# Patient Record
Sex: MALE | Race: ASIAN | NOT HISPANIC OR LATINO | Employment: FULL TIME | URBAN - METROPOLITAN AREA
[De-identification: names, ages, dates, MRNs, and addresses within clinical notes are randomized per-mention and may not be internally consistent; named-entity substitution may affect disease eponyms.]

---

## 2017-02-03 ENCOUNTER — CHARTING TRANS (OUTPATIENT)
Dept: INTERNAL MEDICINE | Age: 47
End: 2017-02-03

## 2018-01-01 ENCOUNTER — EXTERNAL RECORD (OUTPATIENT)
Dept: HEALTH INFORMATION MANAGEMENT | Facility: OTHER | Age: 48
End: 2018-01-01

## 2018-11-28 ENCOUNTER — HOSPITAL ENCOUNTER (OUTPATIENT)
Dept: CT IMAGING | Age: 48
Discharge: HOME | End: 2018-11-28
Attending: INTERNAL MEDICINE

## 2018-11-28 DIAGNOSIS — Z13.9 ENCOUNTER FOR SCREENING: ICD-10-CM

## 2018-11-28 NOTE — PROGRESS NOTES
Patient here for Calcium Heart Scan - prelim is 0.0. Patient has significant family history for heart disease: father with open heart surgery at 70yo and patient's brother just had one stent placed last week at the age of 48.  Patient is on no meds for cho

## 2018-11-29 ENCOUNTER — CHARTING TRANS (OUTPATIENT)
Dept: OTHER | Age: 48
End: 2018-11-29

## 2018-11-29 ENCOUNTER — IMAGING SERVICES (OUTPATIENT)
Dept: OTHER | Age: 48
End: 2018-11-29

## 2018-11-29 ENCOUNTER — MYAURORA ACCOUNT LINK (OUTPATIENT)
Dept: OTHER | Age: 48
End: 2018-11-29

## 2018-11-29 VITALS
DIASTOLIC BLOOD PRESSURE: 74 MMHG | BODY MASS INDEX: 25.2 KG/M2 | TEMPERATURE: 98 F | HEIGHT: 71 IN | SYSTOLIC BLOOD PRESSURE: 132 MMHG | WEIGHT: 180 LBS | HEART RATE: 66 BPM

## 2018-11-30 ENCOUNTER — LAB SERVICES (OUTPATIENT)
Dept: OTHER | Age: 48
End: 2018-11-30

## 2018-11-30 LAB
25(OH)D3 SERPL-MCNC: 44 NG/ML (ref 30–100)
ALBUMIN SERPL BCG-MCNC: 4.8 G/DL (ref 3.6–5.1)
ALP SERPL-CCNC: 112 U/L (ref 45–115)
ALT SERPL W/O P-5'-P-CCNC: 40 U/L (ref 10–35)
AST SERPL-CCNC: 23 U/L (ref 9–37)
BILIRUB SERPL-MCNC: 0.7 MG/DL (ref 0–1)
BUN SERPL-MCNC: 13 MG/DL (ref 6–27)
CALCIUM SERPL-MCNC: 9.7 MG/DL (ref 8.6–10.6)
CHLORIDE SERPL-SCNC: 99 MMOL/L (ref 96–107)
CHOLEST SERPL-MCNC: 243 MG/DL (ref 140–200)
CREAT SERPL-MCNC: 1.1 MG/DL (ref 0.6–1.6)
DIFFERENTIAL TYPE: ABNORMAL
EST. AVERAGE GLUCOSE BLD GHB EST-MCNC: 104 MG/DL (ref 0–154)
GFR SERPL CREATININE-BSD FRML MDRD: >60 ML/MIN/{1.73M2}
GFR SERPL CREATININE-BSD FRML MDRD: >60 ML/MIN/{1.73M2}
GLUCOSE P FAST SERPL-MCNC: 81 MG/DL (ref 60–100)
HBA1C BLD-MCNC: 5.2 % (ref 4.2–6)
HCO3 SERPL-SCNC: 39 MMOL/L (ref 22–32)
HDLC SERPL-MCNC: 37 MG/DL
HEMATOCRIT: 47.5 % (ref 40–51)
HEMOGLOBIN: 16.3 G/DL (ref 13.7–17.5)
LDLC SERPL CALC-MCNC: 157 MG/DL (ref 0–100)
LYMPH PERCENT: 20.6 % (ref 20.5–51.1)
LYMPHOCYTE ABSOLUTE #: 1.4 10*3/UL (ref 1.2–3.4)
MEAN CORPUSCULAR HGB CONCENTRATION: 34.3 % (ref 32–36)
MEAN CORPUSCULAR HGB: 28.2 PG (ref 27–34)
MEAN CORPUSCULAR VOLUME: 82 FL (ref 79–95)
MEAN PLATELET VOLUME: 9.8 FL (ref 8.6–12.4)
MIXED %: 8 % (ref 4.3–12.9)
MIXED ABSOLUTE #: 0.6 10*3/UL (ref 0.2–0.9)
NEUTROPHIL ABSOLUTE #: 4.9 10*3/UL (ref 1.4–6.5)
NEUTROPHIL PERCENT: 71.4 % (ref 34–73.5)
PLATELET COUNT: 263 10*3/UL (ref 150–400)
POTASSIUM SERPL-SCNC: 4.4 MMOL/L (ref 3.5–5.3)
PROT SERPL-MCNC: 8.2 G/DL (ref 6.2–8.1)
RED BLOOD CELL COUNT: 5.79 10*6/UL (ref 3.9–5.7)
RED CELL DISTRIBUTION WIDTH: 13.3 % (ref 11.3–14.8)
SODIUM SERPL-SCNC: 139 MMOL/L (ref 136–146)
TRIGL SERPL-MCNC: 245 MG/DL (ref 0–200)
TSH SERPL DL<=0.05 MIU/L-ACNC: 1.78 M[IU]/L (ref 0.3–4.82)
WHITE BLOOD CELL COUNT: 6.9 10*3/UL (ref 4–10)

## 2018-12-01 DIAGNOSIS — E78.5 HYPERLIPIDEMIA, UNSPECIFIED HYPERLIPIDEMIA TYPE: Primary | ICD-10-CM

## 2018-12-01 RX ORDER — ROSUVASTATIN CALCIUM 20 MG/1
20 TABLET, COATED ORAL DAILY
Qty: 90 TABLET | Refills: 0 | Status: SHIPPED | OUTPATIENT
Start: 2018-12-01 | End: 2019-01-09 | Stop reason: SDUPTHER

## 2018-12-04 VITALS
SYSTOLIC BLOOD PRESSURE: 140 MMHG | BODY MASS INDEX: 24.78 KG/M2 | DIASTOLIC BLOOD PRESSURE: 92 MMHG | TEMPERATURE: 95.9 F | HEART RATE: 87 BPM | WEIGHT: 177 LBS | HEIGHT: 71 IN

## 2018-12-27 ENCOUNTER — TELEPHONE (OUTPATIENT)
Dept: INTERNAL MEDICINE | Age: 48
End: 2018-12-27

## 2019-01-09 ENCOUNTER — OFFICE VISIT (OUTPATIENT)
Dept: INTERNAL MEDICINE | Age: 49
End: 2019-01-09

## 2019-01-09 VITALS
OXYGEN SATURATION: 99 % | HEART RATE: 63 BPM | TEMPERATURE: 95.6 F | SYSTOLIC BLOOD PRESSURE: 134 MMHG | DIASTOLIC BLOOD PRESSURE: 78 MMHG | WEIGHT: 178 LBS | HEIGHT: 71 IN | BODY MASS INDEX: 24.92 KG/M2

## 2019-01-09 DIAGNOSIS — E78.2 MIXED HYPERLIPIDEMIA: ICD-10-CM

## 2019-01-09 DIAGNOSIS — E34.9 TESTOSTERONE DEFICIENCY: ICD-10-CM

## 2019-01-09 DIAGNOSIS — I10 ESSENTIAL HYPERTENSION: Primary | ICD-10-CM

## 2019-01-09 PROCEDURE — 3075F SYST BP GE 130 - 139MM HG: CPT | Performed by: INTERNAL MEDICINE

## 2019-01-09 PROCEDURE — 99214 OFFICE O/P EST MOD 30 MIN: CPT | Performed by: INTERNAL MEDICINE

## 2019-01-09 PROCEDURE — 3078F DIAST BP <80 MM HG: CPT | Performed by: INTERNAL MEDICINE

## 2019-01-09 RX ORDER — OMEGA-3/DHA/EPA/FISH OIL 60 MG-90MG
CAPSULE ORAL
COMMUNITY

## 2019-01-09 RX ORDER — MULTIVITAMIN
TABLET ORAL
COMMUNITY

## 2019-01-09 RX ORDER — UBIDECARENONE 100 MG
1 CAPSULE ORAL DAILY
COMMUNITY

## 2019-01-09 RX ORDER — AMLODIPINE BESYLATE 5 MG/1
5 TABLET ORAL DAILY
Qty: 90 TABLET | Refills: 0 | Status: SHIPPED | OUTPATIENT
Start: 2019-01-09 | End: 2019-04-07 | Stop reason: SDUPTHER

## 2019-01-09 RX ORDER — ROSUVASTATIN CALCIUM 20 MG/1
20 TABLET, COATED ORAL DAILY
Qty: 90 TABLET | Refills: 0 | Status: SHIPPED | OUTPATIENT
Start: 2019-01-09 | End: 2019-04-23 | Stop reason: SDUPTHER

## 2019-01-09 RX ORDER — AMLODIPINE BESYLATE 5 MG/1
5 TABLET ORAL
COMMUNITY
Start: 2018-11-29 | End: 2019-01-09 | Stop reason: SDUPTHER

## 2019-01-09 SDOH — HEALTH STABILITY: MENTAL HEALTH: HOW OFTEN DO YOU HAVE A DRINK CONTAINING ALCOHOL?: NEVER

## 2019-04-07 DIAGNOSIS — I10 ESSENTIAL HYPERTENSION: ICD-10-CM

## 2019-04-10 RX ORDER — AMLODIPINE BESYLATE 5 MG/1
TABLET ORAL
Qty: 90 TABLET | Refills: 0 | Status: SHIPPED | OUTPATIENT
Start: 2019-04-10 | End: 2019-04-23 | Stop reason: SDUPTHER

## 2019-04-23 DIAGNOSIS — I10 ESSENTIAL HYPERTENSION: ICD-10-CM

## 2019-04-23 DIAGNOSIS — E78.2 MIXED HYPERLIPIDEMIA: ICD-10-CM

## 2019-04-24 RX ORDER — AMLODIPINE BESYLATE 5 MG/1
5 TABLET ORAL DAILY
Qty: 90 TABLET | Refills: 0 | Status: SHIPPED | OUTPATIENT
Start: 2019-04-24 | End: 2019-07-26 | Stop reason: SDUPTHER

## 2019-04-24 RX ORDER — ROSUVASTATIN CALCIUM 20 MG/1
20 TABLET, COATED ORAL DAILY
Qty: 90 TABLET | Refills: 0 | Status: SHIPPED | OUTPATIENT
Start: 2019-04-24 | End: 2019-07-26 | Stop reason: SDUPTHER

## 2019-07-26 DIAGNOSIS — I10 ESSENTIAL HYPERTENSION: ICD-10-CM

## 2019-07-26 DIAGNOSIS — E78.2 MIXED HYPERLIPIDEMIA: ICD-10-CM

## 2019-07-31 RX ORDER — ROSUVASTATIN CALCIUM 20 MG/1
20 TABLET, COATED ORAL DAILY
Qty: 90 TABLET | Refills: 0 | Status: SHIPPED | OUTPATIENT
Start: 2019-07-31 | End: 2019-10-30 | Stop reason: SDUPTHER

## 2019-07-31 RX ORDER — AMLODIPINE BESYLATE 5 MG/1
5 TABLET ORAL DAILY
Qty: 90 TABLET | Refills: 0 | Status: SHIPPED | OUTPATIENT
Start: 2019-07-31 | End: 2019-10-30 | Stop reason: SDUPTHER

## 2019-10-29 DIAGNOSIS — E78.2 MIXED HYPERLIPIDEMIA: ICD-10-CM

## 2019-10-29 DIAGNOSIS — I10 ESSENTIAL HYPERTENSION: ICD-10-CM

## 2019-10-29 RX ORDER — AMLODIPINE BESYLATE 5 MG/1
5 TABLET ORAL DAILY
Qty: 90 TABLET | Refills: 0 | Status: CANCELLED | OUTPATIENT
Start: 2019-10-29

## 2019-10-29 RX ORDER — ROSUVASTATIN CALCIUM 20 MG/1
20 TABLET, COATED ORAL DAILY
Qty: 90 TABLET | Refills: 0 | Status: CANCELLED | OUTPATIENT
Start: 2019-10-29

## 2019-10-30 ENCOUNTER — OFFICE VISIT (OUTPATIENT)
Dept: INTERNAL MEDICINE | Age: 49
End: 2019-10-30

## 2019-10-30 VITALS
RESPIRATION RATE: 12 BRPM | WEIGHT: 180 LBS | DIASTOLIC BLOOD PRESSURE: 70 MMHG | BODY MASS INDEX: 25.2 KG/M2 | HEIGHT: 71 IN | TEMPERATURE: 97.6 F | SYSTOLIC BLOOD PRESSURE: 130 MMHG | HEART RATE: 70 BPM

## 2019-10-30 DIAGNOSIS — I10 ESSENTIAL HYPERTENSION: Primary | ICD-10-CM

## 2019-10-30 DIAGNOSIS — E78.2 MIXED HYPERLIPIDEMIA: ICD-10-CM

## 2019-10-30 DIAGNOSIS — J30.9 ALLERGIC RHINITIS, UNSPECIFIED SEASONALITY, UNSPECIFIED TRIGGER: ICD-10-CM

## 2019-10-30 PROCEDURE — 3075F SYST BP GE 130 - 139MM HG: CPT | Performed by: INTERNAL MEDICINE

## 2019-10-30 PROCEDURE — 3078F DIAST BP <80 MM HG: CPT | Performed by: INTERNAL MEDICINE

## 2019-10-30 PROCEDURE — 99214 OFFICE O/P EST MOD 30 MIN: CPT | Performed by: INTERNAL MEDICINE

## 2019-10-30 RX ORDER — ROSUVASTATIN CALCIUM 20 MG/1
20 TABLET, COATED ORAL DAILY
Qty: 90 TABLET | Refills: 0 | Status: SHIPPED | OUTPATIENT
Start: 2019-10-30 | End: 2020-01-21 | Stop reason: SDUPTHER

## 2019-10-30 RX ORDER — AMLODIPINE BESYLATE 5 MG/1
5 TABLET ORAL DAILY
Qty: 90 TABLET | Refills: 0 | Status: SHIPPED | OUTPATIENT
Start: 2019-10-30 | End: 2020-01-26 | Stop reason: SDUPTHER

## 2019-10-30 SDOH — HEALTH STABILITY: PHYSICAL HEALTH: ON AVERAGE, HOW MANY DAYS PER WEEK DO YOU ENGAGE IN MODERATE TO STRENUOUS EXERCISE (LIKE A BRISK WALK)?: 3 DAYS

## 2019-10-30 SDOH — HEALTH STABILITY: PHYSICAL HEALTH: ON AVERAGE, HOW MANY MINUTES DO YOU ENGAGE IN EXERCISE AT THIS LEVEL?: 30 MIN

## 2019-10-30 SDOH — HEALTH STABILITY: MENTAL HEALTH
STRESS IS WHEN SOMEONE FEELS TENSE, NERVOUS, ANXIOUS, OR CAN'T SLEEP AT NIGHT BECAUSE THEIR MIND IS TROUBLED. HOW STRESSED ARE YOU?: ONLY A LITTLE

## 2019-10-30 SDOH — HEALTH STABILITY: MENTAL HEALTH: HOW OFTEN DO YOU HAVE A DRINK CONTAINING ALCOHOL?: NEVER

## 2019-10-30 ASSESSMENT — PATIENT HEALTH QUESTIONNAIRE - PHQ9
SUM OF ALL RESPONSES TO PHQ9 QUESTIONS 1 AND 2: 0
2. FEELING DOWN, DEPRESSED OR HOPELESS: NOT AT ALL
SUM OF ALL RESPONSES TO PHQ9 QUESTIONS 1 AND 2: 0
1. LITTLE INTEREST OR PLEASURE IN DOING THINGS: NOT AT ALL

## 2019-11-08 ENCOUNTER — E-ADVICE (OUTPATIENT)
Dept: INTERNAL MEDICINE | Age: 49
End: 2019-11-08

## 2019-12-12 ENCOUNTER — LAB SERVICES (OUTPATIENT)
Dept: LAB | Age: 49
End: 2019-12-12

## 2019-12-12 DIAGNOSIS — E78.2 MIXED HYPERLIPIDEMIA: ICD-10-CM

## 2019-12-12 LAB
ALBUMIN SERPL BCG-MCNC: 4.7 G/DL (ref 3.6–5.1)
ALP SERPL-CCNC: 87 U/L (ref 45–115)
ALT SERPL W/O P-5'-P-CCNC: 50 U/L (ref 10–35)
AST SERPL-CCNC: 32 U/L (ref 9–37)
BILIRUB SERPL-MCNC: 0.6 MG/DL (ref 0–1)
BUN SERPL-MCNC: 11 MG/DL (ref 6–27)
CALCIUM SERPL-MCNC: 9.3 MG/DL (ref 8.6–10.6)
CHLORIDE SERPL-SCNC: 102 MMOL/L (ref 96–107)
CHOLEST SERPL-MCNC: 151 MG/DL (ref 140–200)
CREAT SERPL-MCNC: 0.9 MG/DL (ref 0.6–1.6)
GFR SERPL CREATININE-BSD FRML MDRD: >60 ML/MIN/{1.73M2}
GFR SERPL CREATININE-BSD FRML MDRD: >60 ML/MIN/{1.73M2}
GLUCOSE P FAST SERPL-MCNC: 92 MG/DL (ref 60–100)
HCO3 SERPL-SCNC: 29 MMOL/L (ref 22–32)
HDLC SERPL-MCNC: 36 MG/DL
LDLC SERPL CALC-MCNC: 74 MG/DL (ref 0–100)
POTASSIUM SERPL-SCNC: 4.3 MMOL/L (ref 3.5–5.3)
PROT SERPL-MCNC: 7.8 G/DL (ref 6.2–8.1)
SODIUM SERPL-SCNC: 139 MMOL/L (ref 136–146)
TRIGL SERPL-MCNC: 205 MG/DL (ref 0–200)

## 2019-12-12 PROCEDURE — 80053 COMPREHEN METABOLIC PANEL: CPT | Performed by: INTERNAL MEDICINE

## 2019-12-12 PROCEDURE — 80061 LIPID PANEL: CPT | Performed by: INTERNAL MEDICINE

## 2019-12-12 PROCEDURE — 36415 COLL VENOUS BLD VENIPUNCTURE: CPT | Performed by: INTERNAL MEDICINE

## 2020-01-14 ENCOUNTER — APPOINTMENT (OUTPATIENT)
Dept: INTERNAL MEDICINE | Age: 50
End: 2020-01-14

## 2020-01-21 DIAGNOSIS — E78.2 MIXED HYPERLIPIDEMIA: ICD-10-CM

## 2020-01-21 RX ORDER — ROSUVASTATIN CALCIUM 20 MG/1
TABLET, COATED ORAL
Qty: 90 TABLET | Refills: 1 | Status: SHIPPED | OUTPATIENT
Start: 2020-01-21 | End: 2020-07-31

## 2020-01-26 DIAGNOSIS — I10 ESSENTIAL HYPERTENSION: ICD-10-CM

## 2020-01-27 RX ORDER — AMLODIPINE BESYLATE 5 MG/1
TABLET ORAL
Qty: 30 TABLET | Refills: 0 | Status: SHIPPED | OUTPATIENT
Start: 2020-01-27 | End: 2020-02-19 | Stop reason: SDUPTHER

## 2020-01-29 DIAGNOSIS — I10 ESSENTIAL HYPERTENSION: ICD-10-CM

## 2020-01-31 RX ORDER — AMLODIPINE BESYLATE 5 MG/1
TABLET ORAL
Qty: 90 TABLET | Refills: 0 | OUTPATIENT
Start: 2020-01-31

## 2020-02-19 DIAGNOSIS — I10 ESSENTIAL HYPERTENSION: ICD-10-CM

## 2020-02-20 RX ORDER — AMLODIPINE BESYLATE 5 MG/1
5 TABLET ORAL DAILY
Qty: 30 TABLET | Refills: 0 | Status: SHIPPED | OUTPATIENT
Start: 2020-02-20 | End: 2020-03-26

## 2020-03-26 DIAGNOSIS — I10 ESSENTIAL HYPERTENSION: ICD-10-CM

## 2020-03-26 RX ORDER — AMLODIPINE BESYLATE 5 MG/1
TABLET ORAL
Qty: 30 TABLET | Refills: 0 | Status: SHIPPED | OUTPATIENT
Start: 2020-03-26 | End: 2020-04-25

## 2020-04-03 DIAGNOSIS — I10 ESSENTIAL HYPERTENSION: ICD-10-CM

## 2020-04-03 RX ORDER — AMLODIPINE BESYLATE 5 MG/1
TABLET ORAL
Qty: 30 TABLET | Refills: 0 | OUTPATIENT
Start: 2020-04-03

## 2020-04-25 DIAGNOSIS — I10 ESSENTIAL HYPERTENSION: ICD-10-CM

## 2020-04-25 RX ORDER — AMLODIPINE BESYLATE 5 MG/1
TABLET ORAL
Qty: 30 TABLET | Refills: 0 | Status: SHIPPED | OUTPATIENT
Start: 2020-04-25 | End: 2020-05-25

## 2020-05-22 DIAGNOSIS — I10 ESSENTIAL HYPERTENSION: ICD-10-CM

## 2020-05-25 RX ORDER — AMLODIPINE BESYLATE 5 MG/1
TABLET ORAL
Qty: 30 TABLET | Refills: 0 | Status: SHIPPED | OUTPATIENT
Start: 2020-05-25 | End: 2020-06-24

## 2020-06-24 DIAGNOSIS — I10 ESSENTIAL HYPERTENSION: ICD-10-CM

## 2020-06-24 RX ORDER — AMLODIPINE BESYLATE 5 MG/1
TABLET ORAL
Qty: 30 TABLET | Refills: 0 | Status: SHIPPED | OUTPATIENT
Start: 2020-06-24 | End: 2020-07-31

## 2020-07-02 DIAGNOSIS — I10 ESSENTIAL HYPERTENSION: ICD-10-CM

## 2020-07-02 RX ORDER — AMLODIPINE BESYLATE 5 MG/1
5 TABLET ORAL DAILY
Qty: 30 TABLET | Refills: 0 | OUTPATIENT
Start: 2020-07-02

## 2020-07-28 DIAGNOSIS — I10 ESSENTIAL HYPERTENSION: ICD-10-CM

## 2020-07-31 ENCOUNTER — V-VISIT (OUTPATIENT)
Dept: INTERNAL MEDICINE | Age: 50
End: 2020-07-31

## 2020-07-31 DIAGNOSIS — E78.2 MIXED HYPERLIPIDEMIA: ICD-10-CM

## 2020-07-31 DIAGNOSIS — I10 ESSENTIAL HYPERTENSION: ICD-10-CM

## 2020-07-31 DIAGNOSIS — I10 ESSENTIAL HYPERTENSION: Primary | ICD-10-CM

## 2020-07-31 PROCEDURE — 99214 OFFICE O/P EST MOD 30 MIN: CPT | Performed by: INTERNAL MEDICINE

## 2020-07-31 RX ORDER — ROSUVASTATIN CALCIUM 20 MG/1
20 TABLET, COATED ORAL DAILY
Qty: 90 TABLET | Refills: 1 | Status: SHIPPED | OUTPATIENT
Start: 2020-07-31

## 2020-07-31 RX ORDER — AMLODIPINE BESYLATE 5 MG/1
TABLET ORAL
Qty: 30 TABLET | Refills: 0 | Status: SHIPPED | OUTPATIENT
Start: 2020-07-31 | End: 2022-07-06 | Stop reason: ALTCHOICE

## 2020-07-31 RX ORDER — AMLODIPINE BESYLATE 5 MG/1
5 TABLET ORAL DAILY
Qty: 90 TABLET | Refills: 1 | Status: SHIPPED | OUTPATIENT
Start: 2020-07-31 | End: 2022-07-06 | Stop reason: ALTCHOICE

## 2020-07-31 RX ORDER — ROSUVASTATIN CALCIUM 20 MG/1
TABLET, COATED ORAL
Qty: 90 TABLET | Refills: 1 | Status: SHIPPED | OUTPATIENT
Start: 2020-07-31 | End: 2020-07-31 | Stop reason: SDUPTHER

## 2020-07-31 SDOH — HEALTH STABILITY: MENTAL HEALTH
STRESS IS WHEN SOMEONE FEELS TENSE, NERVOUS, ANXIOUS, OR CAN'T SLEEP AT NIGHT BECAUSE THEIR MIND IS TROUBLED. HOW STRESSED ARE YOU?: NOT AT ALL

## 2020-07-31 SDOH — HEALTH STABILITY: PHYSICAL HEALTH: ON AVERAGE, HOW MANY DAYS PER WEEK DO YOU ENGAGE IN MODERATE TO STRENUOUS EXERCISE (LIKE A BRISK WALK)?: 0 DAYS

## 2020-07-31 SDOH — HEALTH STABILITY: PHYSICAL HEALTH: ON AVERAGE, HOW MANY MINUTES DO YOU ENGAGE IN EXERCISE AT THIS LEVEL?: NOT ASKED

## 2020-07-31 ASSESSMENT — PATIENT HEALTH QUESTIONNAIRE - PHQ9
CLINICAL INTERPRETATION OF PHQ9 SCORE: NO FURTHER SCREENING NEEDED
SUM OF ALL RESPONSES TO PHQ9 QUESTIONS 1 AND 2: 0
1. LITTLE INTEREST OR PLEASURE IN DOING THINGS: NOT AT ALL
SUM OF ALL RESPONSES TO PHQ9 QUESTIONS 1 AND 2: 0
CLINICAL INTERPRETATION OF PHQ2 SCORE: NO FURTHER SCREENING NEEDED
2. FEELING DOWN, DEPRESSED OR HOPELESS: NOT AT ALL

## 2020-08-01 RX ORDER — ROSUVASTATIN CALCIUM 20 MG/1
20 TABLET, COATED ORAL DAILY
Qty: 90 TABLET | Refills: 1 | OUTPATIENT
Start: 2020-08-01

## 2020-08-01 RX ORDER — AMLODIPINE BESYLATE 5 MG/1
5 TABLET ORAL DAILY
Qty: 30 TABLET | Refills: 0 | OUTPATIENT
Start: 2020-08-01

## 2021-01-13 ENCOUNTER — OFFICE VISIT (OUTPATIENT)
Dept: FAMILY MEDICINE CLINIC | Facility: CLINIC | Age: 51
End: 2021-01-13
Payer: COMMERCIAL

## 2021-01-13 VITALS
RESPIRATION RATE: 18 BRPM | BODY MASS INDEX: 25.76 KG/M2 | OXYGEN SATURATION: 98 % | WEIGHT: 182 LBS | HEIGHT: 70.47 IN | HEART RATE: 85 BPM | DIASTOLIC BLOOD PRESSURE: 80 MMHG | SYSTOLIC BLOOD PRESSURE: 140 MMHG

## 2021-01-13 DIAGNOSIS — E53.8 B12 DEFICIENCY: ICD-10-CM

## 2021-01-13 DIAGNOSIS — I10 ESSENTIAL HYPERTENSION: ICD-10-CM

## 2021-01-13 DIAGNOSIS — E78.5 HYPERLIPIDEMIA, UNSPECIFIED HYPERLIPIDEMIA TYPE: ICD-10-CM

## 2021-01-13 DIAGNOSIS — Z00.00 ANNUAL PHYSICAL EXAM: Primary | ICD-10-CM

## 2021-01-13 DIAGNOSIS — E55.9 VITAMIN D DEFICIENCY: ICD-10-CM

## 2021-01-13 PROCEDURE — 3079F DIAST BP 80-89 MM HG: CPT | Performed by: FAMILY MEDICINE

## 2021-01-13 PROCEDURE — 99386 PREV VISIT NEW AGE 40-64: CPT | Performed by: FAMILY MEDICINE

## 2021-01-13 PROCEDURE — 3077F SYST BP >= 140 MM HG: CPT | Performed by: FAMILY MEDICINE

## 2021-01-13 PROCEDURE — 3008F BODY MASS INDEX DOCD: CPT | Performed by: FAMILY MEDICINE

## 2021-01-13 PROCEDURE — 99202 OFFICE O/P NEW SF 15 MIN: CPT | Performed by: FAMILY MEDICINE

## 2021-01-13 RX ORDER — OMEGA-3/DHA/EPA/FISH OIL 60 MG-90MG
CAPSULE ORAL
COMMUNITY
End: 2021-01-13

## 2021-01-13 RX ORDER — LISINOPRIL AND HYDROCHLOROTHIAZIDE 12.5; 1 MG/1; MG/1
1 TABLET ORAL DAILY
Qty: 90 TABLET | Refills: 3 | Status: SHIPPED | OUTPATIENT
Start: 2021-01-13 | End: 2022-01-08

## 2021-01-13 RX ORDER — OMEGA-3 FATTY ACIDS CAP DELAYED RELEASE 1000 MG 1000 MG
1000 CAPSULE DELAYED RELEASE ORAL DAILY
Refills: 0 | COMMUNITY
Start: 2021-01-13

## 2021-01-13 RX ORDER — ROSUVASTATIN CALCIUM 20 MG/1
20 TABLET, COATED ORAL DAILY
COMMUNITY
Start: 2020-07-31 | End: 2021-05-13

## 2021-01-13 NOTE — H&P
Maurice Wallace is a 53year old male who presents for a complete physical exam.   HPI:   PFH includes early heart disease in father and brother. Pt had heart scan completed ~ 3 years ago. Hyperlipidemia  This is a chronic problem.  The current episode sta Take 5 mg by mouth nightly. • Multiple Vitamin (MULTI-VITAMIN DAILY) Oral Tab Take by mouth. • amLODIPine Besylate 5 MG Oral Tab Take 5 mg by mouth daily. • Coenzyme Q10 (COQ10) 100 MG Oral Cap Take by mouth.         No past medical history on f clubbing or edema  NEURO: Oriented times three, strength 5/5 x 4 ext, LE DTRs 2+  PSYCH: mood and affect appropriate    ASSESSMENT AND PLAN:   Daniel Aragon is a 53year old male who presents for a complete physical exam. Recommended heart healthy diet, Standing Expiration Date: 1/13/2022      Lipid Panel [E]          Standing Status: Future          Standing Expiration Date: 1/13/2022      TSH W Reflex To Free T4 [E]          Standing Status: Future          Standing Expiration Date: 1/13/2022      Vitam

## 2021-01-14 ENCOUNTER — LAB ENCOUNTER (OUTPATIENT)
Dept: LAB | Age: 51
End: 2021-01-14
Attending: FAMILY MEDICINE
Payer: COMMERCIAL

## 2021-01-14 DIAGNOSIS — E55.9 VITAMIN D DEFICIENCY: ICD-10-CM

## 2021-01-14 DIAGNOSIS — E53.8 B12 DEFICIENCY: ICD-10-CM

## 2021-01-14 DIAGNOSIS — Z00.00 ANNUAL PHYSICAL EXAM: ICD-10-CM

## 2021-01-14 LAB
ALBUMIN SERPL-MCNC: 4.1 G/DL (ref 3.4–5)
ALBUMIN/GLOB SERPL: 0.9 {RATIO} (ref 1–2)
ALP LIVER SERPL-CCNC: 98 U/L
ALT SERPL-CCNC: 61 U/L
ANION GAP SERPL CALC-SCNC: 4 MMOL/L (ref 0–18)
AST SERPL-CCNC: 31 U/L (ref 15–37)
BASOPHILS # BLD AUTO: 0.03 X10(3) UL (ref 0–0.2)
BASOPHILS NFR BLD AUTO: 0.4 %
BILIRUB SERPL-MCNC: 0.7 MG/DL (ref 0.1–2)
BUN BLD-MCNC: 10 MG/DL (ref 7–18)
BUN/CREAT SERPL: 11 (ref 10–20)
CALCIUM BLD-MCNC: 9.4 MG/DL (ref 8.5–10.1)
CHLORIDE SERPL-SCNC: 106 MMOL/L (ref 98–112)
CHOLEST SMN-MCNC: 160 MG/DL (ref ?–200)
CO2 SERPL-SCNC: 29 MMOL/L (ref 21–32)
CREAT BLD-MCNC: 0.91 MG/DL
DEPRECATED RDW RBC AUTO: 39.9 FL (ref 35.1–46.3)
EOSINOPHIL # BLD AUTO: 0.33 X10(3) UL (ref 0–0.7)
EOSINOPHIL NFR BLD AUTO: 4.5 %
ERYTHROCYTE [DISTWIDTH] IN BLOOD BY AUTOMATED COUNT: 13.2 % (ref 11–15)
GLOBULIN PLAS-MCNC: 4.4 G/DL (ref 2.8–4.4)
GLUCOSE BLD-MCNC: 89 MG/DL (ref 70–99)
HCT VFR BLD AUTO: 47.8 %
HDLC SERPL-MCNC: 35 MG/DL (ref 40–59)
HGB BLD-MCNC: 15.7 G/DL
IMM GRANULOCYTES # BLD AUTO: 0.01 X10(3) UL (ref 0–1)
IMM GRANULOCYTES NFR BLD: 0.1 %
LDLC SERPL CALC-MCNC: 78 MG/DL (ref ?–100)
LYMPHOCYTES # BLD AUTO: 2.08 X10(3) UL (ref 1–4)
LYMPHOCYTES NFR BLD AUTO: 28.1 %
M PROTEIN MFR SERPL ELPH: 8.5 G/DL (ref 6.4–8.2)
MCH RBC QN AUTO: 27.6 PG (ref 26–34)
MCHC RBC AUTO-ENTMCNC: 32.8 G/DL (ref 31–37)
MCV RBC AUTO: 84 FL
MONOCYTES # BLD AUTO: 0.58 X10(3) UL (ref 0.1–1)
MONOCYTES NFR BLD AUTO: 7.8 %
NEUTROPHILS # BLD AUTO: 4.38 X10 (3) UL (ref 1.5–7.7)
NEUTROPHILS # BLD AUTO: 4.38 X10(3) UL (ref 1.5–7.7)
NEUTROPHILS NFR BLD AUTO: 59.1 %
NONHDLC SERPL-MCNC: 125 MG/DL (ref ?–130)
OSMOLALITY SERPL CALC.SUM OF ELEC: 287 MOSM/KG (ref 275–295)
PATIENT FASTING Y/N/NP: YES
PATIENT FASTING Y/N/NP: YES
PLATELET # BLD AUTO: 279 10(3)UL (ref 150–450)
POTASSIUM SERPL-SCNC: 5.1 MMOL/L (ref 3.5–5.1)
RBC # BLD AUTO: 5.69 X10(6)UL
SODIUM SERPL-SCNC: 139 MMOL/L (ref 136–145)
TRIGL SERPL-MCNC: 235 MG/DL (ref 30–149)
TSI SER-ACNC: 2.61 MIU/ML (ref 0.36–3.74)
VIT B12 SERPL-MCNC: 594 PG/ML (ref 193–986)
VIT D+METAB SERPL-MCNC: 34.5 NG/ML (ref 30–100)
VLDLC SERPL CALC-MCNC: 47 MG/DL (ref 0–30)
WBC # BLD AUTO: 7.4 X10(3) UL (ref 4–11)

## 2021-01-14 PROCEDURE — 36415 COLL VENOUS BLD VENIPUNCTURE: CPT

## 2021-01-14 PROCEDURE — 82306 VITAMIN D 25 HYDROXY: CPT

## 2021-01-14 PROCEDURE — 85025 COMPLETE CBC W/AUTO DIFF WBC: CPT

## 2021-01-14 PROCEDURE — 82607 VITAMIN B-12: CPT

## 2021-01-14 PROCEDURE — 84443 ASSAY THYROID STIM HORMONE: CPT

## 2021-01-14 PROCEDURE — 80053 COMPREHEN METABOLIC PANEL: CPT

## 2021-01-14 PROCEDURE — 80061 LIPID PANEL: CPT

## 2021-02-10 ENCOUNTER — TELEPHONE (OUTPATIENT)
Dept: FAMILY MEDICINE CLINIC | Facility: CLINIC | Age: 51
End: 2021-02-10

## 2021-02-28 DIAGNOSIS — I10 ESSENTIAL HYPERTENSION: ICD-10-CM

## 2021-02-28 RX ORDER — AMLODIPINE BESYLATE 5 MG/1
TABLET ORAL
Qty: 90 TABLET | Refills: 1 | OUTPATIENT
Start: 2021-02-28

## 2021-03-24 ENCOUNTER — OFFICE VISIT (OUTPATIENT)
Dept: FAMILY MEDICINE CLINIC | Facility: CLINIC | Age: 51
End: 2021-03-24
Payer: COMMERCIAL

## 2021-03-24 VITALS
BODY MASS INDEX: 25.51 KG/M2 | WEIGHT: 182.19 LBS | OXYGEN SATURATION: 97 % | DIASTOLIC BLOOD PRESSURE: 94 MMHG | HEART RATE: 82 BPM | HEIGHT: 71 IN | RESPIRATION RATE: 18 BRPM | SYSTOLIC BLOOD PRESSURE: 142 MMHG | TEMPERATURE: 98 F

## 2021-03-24 DIAGNOSIS — I10 ESSENTIAL HYPERTENSION: Primary | ICD-10-CM

## 2021-03-24 DIAGNOSIS — E78.1 HIGH TRIGLYCERIDES: ICD-10-CM

## 2021-03-24 DIAGNOSIS — N52.9 ERECTILE DYSFUNCTION, UNSPECIFIED ERECTILE DYSFUNCTION TYPE: ICD-10-CM

## 2021-03-24 PROCEDURE — 99214 OFFICE O/P EST MOD 30 MIN: CPT | Performed by: FAMILY MEDICINE

## 2021-03-24 PROCEDURE — 3008F BODY MASS INDEX DOCD: CPT | Performed by: FAMILY MEDICINE

## 2021-03-24 PROCEDURE — 3077F SYST BP >= 140 MM HG: CPT | Performed by: FAMILY MEDICINE

## 2021-03-24 PROCEDURE — 3080F DIAST BP >= 90 MM HG: CPT | Performed by: FAMILY MEDICINE

## 2021-03-24 NOTE — PROGRESS NOTES
HPI/Subjective:   Patient ID: Michelle Zabala is a 53year old male. Hypertension  This is a chronic problem. The current episode started more than 1 year ago. The problem has been gradually worsening since onset. The problem is controlled.  Pertinent nega stress, a sedentary lifestyle and hypertension. History/Other:   Review of Systems   Constitutional: Negative for malaise/fatigue. Eyes: Negative for blurred vision. Respiratory: Negative for shortness of breath.     Cardiovascular: Negative for c Follow up based on results. - LIPID PANEL; Future    3. Erectile dysfunction, unspecified erectile dysfunction type  Not likely due to any current medications. Advised to complete blood work to look for testosterone deficiency.  Discuss treatment based on

## 2021-05-05 DIAGNOSIS — E78.2 MIXED HYPERLIPIDEMIA: ICD-10-CM

## 2021-05-06 RX ORDER — ROSUVASTATIN CALCIUM 20 MG/1
TABLET, COATED ORAL
Qty: 90 TABLET | Refills: 1 | OUTPATIENT
Start: 2021-05-06

## 2021-05-13 NOTE — TELEPHONE ENCOUNTER
Pt requesting refill on his cholesterol med - first time Dr. Shana Balbuena will be prescribing - pls send script to [facility] on [address] near [facility] .   Pt asking to expedite as he is out

## 2021-05-13 NOTE — TELEPHONE ENCOUNTER
LOV 3/24/21  We have not filled before. Pended #90 + 1 if agreeable. Pt is asking for urgent fill; he is out.

## 2021-05-14 RX ORDER — ROSUVASTATIN CALCIUM 20 MG/1
20 TABLET, COATED ORAL DAILY
Qty: 90 TABLET | Refills: 1 | Status: SHIPPED | OUTPATIENT
Start: 2021-05-14 | End: 2021-08-05

## 2021-08-05 RX ORDER — ROSUVASTATIN CALCIUM 20 MG/1
20 TABLET, COATED ORAL DAILY
Qty: 90 TABLET | Refills: 1 | Status: SHIPPED | OUTPATIENT
Start: 2021-08-05 | End: 2022-01-31

## 2022-01-27 DIAGNOSIS — I10 ESSENTIAL HYPERTENSION: ICD-10-CM

## 2022-01-28 RX ORDER — AMLODIPINE BESYLATE 5 MG/1
TABLET ORAL
Qty: 90 TABLET | Refills: 1 | OUTPATIENT
Start: 2022-01-28

## 2022-01-31 RX ORDER — LISINOPRIL AND HYDROCHLOROTHIAZIDE 12.5; 1 MG/1; MG/1
TABLET ORAL
Qty: 90 TABLET | Refills: 3 | Status: SHIPPED | OUTPATIENT
Start: 2022-01-31

## 2022-01-31 RX ORDER — ROSUVASTATIN CALCIUM 20 MG/1
TABLET, COATED ORAL
Qty: 90 TABLET | Refills: 1 | Status: SHIPPED | OUTPATIENT
Start: 2022-01-31

## 2022-01-31 RX ORDER — LISINOPRIL AND HYDROCHLOROTHIAZIDE 12.5; 1 MG/1; MG/1
1 TABLET ORAL DAILY
Qty: 90 TABLET | Refills: 3 | OUTPATIENT
Start: 2022-01-31

## 2022-07-03 ENCOUNTER — OFFICE VISIT (OUTPATIENT)
Dept: FAMILY MEDICINE CLINIC | Facility: CLINIC | Age: 52
End: 2022-07-03
Payer: COMMERCIAL

## 2022-07-03 VITALS
WEIGHT: 180 LBS | DIASTOLIC BLOOD PRESSURE: 66 MMHG | HEIGHT: 71 IN | TEMPERATURE: 97 F | SYSTOLIC BLOOD PRESSURE: 125 MMHG | OXYGEN SATURATION: 98 % | HEART RATE: 112 BPM | BODY MASS INDEX: 25.2 KG/M2

## 2022-07-03 DIAGNOSIS — J06.9 VIRAL URI WITH COUGH: Primary | ICD-10-CM

## 2022-07-03 DIAGNOSIS — H10.33 ACUTE BACTERIAL CONJUNCTIVITIS OF BOTH EYES: ICD-10-CM

## 2022-07-03 RX ORDER — POLYMYXIN B SULFATE AND TRIMETHOPRIM 1; 10000 MG/ML; [USP'U]/ML
1 SOLUTION OPHTHALMIC EVERY 4 HOURS
Qty: 1 EACH | Refills: 0 | Status: SHIPPED | OUTPATIENT
Start: 2022-07-03 | End: 2022-07-10

## 2022-07-06 ENCOUNTER — WALK IN (OUTPATIENT)
Dept: URGENT CARE | Age: 52
End: 2022-07-06

## 2022-07-06 VITALS
DIASTOLIC BLOOD PRESSURE: 82 MMHG | TEMPERATURE: 97.2 F | SYSTOLIC BLOOD PRESSURE: 119 MMHG | HEART RATE: 95 BPM | RESPIRATION RATE: 16 BRPM | OXYGEN SATURATION: 97 %

## 2022-07-06 DIAGNOSIS — J32.9 SINUSITIS, UNSPECIFIED CHRONICITY, UNSPECIFIED LOCATION: Primary | ICD-10-CM

## 2022-07-06 PROCEDURE — 3079F DIAST BP 80-89 MM HG: CPT | Performed by: FAMILY MEDICINE

## 2022-07-06 PROCEDURE — 3074F SYST BP LT 130 MM HG: CPT | Performed by: FAMILY MEDICINE

## 2022-07-06 PROCEDURE — 99214 OFFICE O/P EST MOD 30 MIN: CPT | Performed by: FAMILY MEDICINE

## 2022-07-06 RX ORDER — CEFUROXIME AXETIL 500 MG/1
500 TABLET ORAL 2 TIMES DAILY
Qty: 20 TABLET | Refills: 0 | Status: SHIPPED | OUTPATIENT
Start: 2022-07-06 | End: 2022-07-16

## 2022-07-06 RX ORDER — LISINOPRIL AND HYDROCHLOROTHIAZIDE 12.5; 1 MG/1; MG/1
1 TABLET ORAL DAILY
COMMUNITY
Start: 2022-01-31

## 2022-07-06 RX ORDER — POLYMYXIN B SULFATE AND TRIMETHOPRIM 1; 10000 MG/ML; [USP'U]/ML
1 SOLUTION OPHTHALMIC
COMMUNITY
Start: 2022-07-03 | End: 2022-07-10

## 2022-07-06 ASSESSMENT — PAIN SCALES - GENERAL: PAINLEVEL: 0

## 2022-07-28 RX ORDER — ROSUVASTATIN CALCIUM 20 MG/1
TABLET, COATED ORAL
Qty: 90 TABLET | Refills: 1 | OUTPATIENT
Start: 2022-07-28

## 2022-08-04 ENCOUNTER — OFFICE VISIT (OUTPATIENT)
Dept: FAMILY MEDICINE CLINIC | Facility: CLINIC | Age: 52
End: 2022-08-04
Payer: COMMERCIAL

## 2022-08-04 VITALS
DIASTOLIC BLOOD PRESSURE: 74 MMHG | RESPIRATION RATE: 16 BRPM | BODY MASS INDEX: 22.96 KG/M2 | HEART RATE: 77 BPM | SYSTOLIC BLOOD PRESSURE: 136 MMHG | WEIGHT: 164 LBS | HEIGHT: 71 IN | OXYGEN SATURATION: 99 %

## 2022-08-04 DIAGNOSIS — Z23 NEED FOR SHINGLES VACCINE: ICD-10-CM

## 2022-08-04 DIAGNOSIS — Z12.11 COLON CANCER SCREENING: ICD-10-CM

## 2022-08-04 DIAGNOSIS — Z82.49 FAMILY HISTORY OF EARLY CAD: ICD-10-CM

## 2022-08-04 DIAGNOSIS — Z00.00 ANNUAL PHYSICAL EXAM: Primary | ICD-10-CM

## 2022-08-04 PROCEDURE — 3078F DIAST BP <80 MM HG: CPT | Performed by: FAMILY MEDICINE

## 2022-08-04 PROCEDURE — 90750 HZV VACC RECOMBINANT IM: CPT | Performed by: FAMILY MEDICINE

## 2022-08-04 PROCEDURE — 99396 PREV VISIT EST AGE 40-64: CPT | Performed by: FAMILY MEDICINE

## 2022-08-04 PROCEDURE — 90471 IMMUNIZATION ADMIN: CPT | Performed by: FAMILY MEDICINE

## 2022-08-04 PROCEDURE — 3075F SYST BP GE 130 - 139MM HG: CPT | Performed by: FAMILY MEDICINE

## 2022-08-04 PROCEDURE — 3008F BODY MASS INDEX DOCD: CPT | Performed by: FAMILY MEDICINE

## 2022-08-09 ENCOUNTER — LABORATORY ENCOUNTER (OUTPATIENT)
Dept: LAB | Age: 52
End: 2022-08-09
Attending: FAMILY MEDICINE
Payer: COMMERCIAL

## 2022-08-09 DIAGNOSIS — Z00.00 ANNUAL PHYSICAL EXAM: ICD-10-CM

## 2022-08-09 LAB
ALBUMIN SERPL-MCNC: 3.8 G/DL (ref 3.4–5)
ALBUMIN/GLOB SERPL: 1 {RATIO} (ref 1–2)
ALP LIVER SERPL-CCNC: 69 U/L
ALT SERPL-CCNC: 45 U/L
ANION GAP SERPL CALC-SCNC: 5 MMOL/L (ref 0–18)
AST SERPL-CCNC: 20 U/L (ref 15–37)
BASOPHILS # BLD AUTO: 0.02 X10(3) UL (ref 0–0.2)
BASOPHILS NFR BLD AUTO: 0.3 %
BILIRUB SERPL-MCNC: 0.7 MG/DL (ref 0.1–2)
BUN BLD-MCNC: 10 MG/DL (ref 7–18)
BUN/CREAT SERPL: 10.6 (ref 10–20)
CALCIUM BLD-MCNC: 9.6 MG/DL (ref 8.5–10.1)
CHLORIDE SERPL-SCNC: 106 MMOL/L (ref 98–112)
CHOLEST SERPL-MCNC: 140 MG/DL (ref ?–200)
CO2 SERPL-SCNC: 28 MMOL/L (ref 21–32)
COMPLEXED PSA SERPL-MCNC: 0.62 NG/ML (ref ?–4)
CREAT BLD-MCNC: 0.94 MG/DL
DEPRECATED RDW RBC AUTO: 41.2 FL (ref 35.1–46.3)
EOSINOPHIL # BLD AUTO: 0.23 X10(3) UL (ref 0–0.7)
EOSINOPHIL NFR BLD AUTO: 3.7 %
ERYTHROCYTE [DISTWIDTH] IN BLOOD BY AUTOMATED COUNT: 13.2 % (ref 11–15)
FASTING PATIENT LIPID ANSWER: YES
FASTING STATUS PATIENT QL REPORTED: YES
GFR SERPLBLD BASED ON 1.73 SQ M-ARVRAT: 98 ML/MIN/1.73M2 (ref 60–?)
GLOBULIN PLAS-MCNC: 3.9 G/DL (ref 2.8–4.4)
GLUCOSE BLD-MCNC: 95 MG/DL (ref 70–99)
HCT VFR BLD AUTO: 43.8 %
HDLC SERPL-MCNC: 35 MG/DL (ref 40–59)
HGB BLD-MCNC: 14.3 G/DL
IMM GRANULOCYTES # BLD AUTO: 0.02 X10(3) UL (ref 0–1)
IMM GRANULOCYTES NFR BLD: 0.3 %
LDLC SERPL CALC-MCNC: 81 MG/DL (ref ?–100)
LYMPHOCYTES # BLD AUTO: 1.29 X10(3) UL (ref 1–4)
LYMPHOCYTES NFR BLD AUTO: 20.5 %
MCH RBC QN AUTO: 28 PG (ref 26–34)
MCHC RBC AUTO-ENTMCNC: 32.6 G/DL (ref 31–37)
MCV RBC AUTO: 85.9 FL
MONOCYTES # BLD AUTO: 0.43 X10(3) UL (ref 0.1–1)
MONOCYTES NFR BLD AUTO: 6.8 %
NEUTROPHILS # BLD AUTO: 4.31 X10 (3) UL (ref 1.5–7.7)
NEUTROPHILS # BLD AUTO: 4.31 X10(3) UL (ref 1.5–7.7)
NEUTROPHILS NFR BLD AUTO: 68.4 %
NONHDLC SERPL-MCNC: 105 MG/DL (ref ?–130)
OSMOLALITY SERPL CALC.SUM OF ELEC: 287 MOSM/KG (ref 275–295)
PLATELET # BLD AUTO: 227 10(3)UL (ref 150–450)
POTASSIUM SERPL-SCNC: 5.3 MMOL/L (ref 3.5–5.1)
PROT SERPL-MCNC: 7.7 G/DL (ref 6.4–8.2)
RBC # BLD AUTO: 5.1 X10(6)UL
SODIUM SERPL-SCNC: 139 MMOL/L (ref 136–145)
TRIGL SERPL-MCNC: 136 MG/DL (ref 30–149)
TSI SER-ACNC: 1.26 MIU/ML (ref 0.36–3.74)
VIT D+METAB SERPL-MCNC: 33.2 NG/ML (ref 30–100)
VLDLC SERPL CALC-MCNC: 21 MG/DL (ref 0–30)
WBC # BLD AUTO: 6.3 X10(3) UL (ref 4–11)

## 2022-08-09 PROCEDURE — 82306 VITAMIN D 25 HYDROXY: CPT | Performed by: FAMILY MEDICINE

## 2022-08-09 PROCEDURE — 84153 ASSAY OF PSA TOTAL: CPT | Performed by: FAMILY MEDICINE

## 2022-08-09 PROCEDURE — 80050 GENERAL HEALTH PANEL: CPT | Performed by: FAMILY MEDICINE

## 2022-08-09 PROCEDURE — 80061 LIPID PANEL: CPT | Performed by: FAMILY MEDICINE

## 2022-08-29 RX ORDER — ROSUVASTATIN CALCIUM 20 MG/1
TABLET, COATED ORAL
Qty: 30 TABLET | Refills: 0 | Status: SHIPPED | OUTPATIENT
Start: 2022-08-29

## 2022-10-03 ENCOUNTER — PATIENT MESSAGE (OUTPATIENT)
Dept: FAMILY MEDICINE CLINIC | Facility: CLINIC | Age: 52
End: 2022-10-03

## 2022-10-03 RX ORDER — ROSUVASTATIN CALCIUM 20 MG/1
TABLET, COATED ORAL
Qty: 30 TABLET | Refills: 0 | Status: SHIPPED | OUTPATIENT
Start: 2022-10-03

## 2022-10-04 RX ORDER — ROSUVASTATIN CALCIUM 20 MG/1
20 TABLET, COATED ORAL DAILY
Qty: 90 TABLET | Refills: 3 | Status: SHIPPED | OUTPATIENT
Start: 2022-10-04

## 2022-10-04 NOTE — TELEPHONE ENCOUNTER
From: Barb Sandra  To: Rosina Isabel DO  Sent: 10/3/2022 8:31 AM CDT  Subject: Prescription refills    Dr. Nila Ford, good morning. For my prescription refills, could you please send in 90 day refills instead of 30 day refills? I am traveling to [facility] for 3 weeks in November. I'm afraid I'll run out during my trip. Thank you.     Best regards,  Devora

## 2023-01-16 RX ORDER — LISINOPRIL AND HYDROCHLOROTHIAZIDE 12.5; 1 MG/1; MG/1
TABLET ORAL
Qty: 90 TABLET | Refills: 3 | Status: SHIPPED | OUTPATIENT
Start: 2023-01-16

## 2023-04-27 PROBLEM — D12.0 BENIGN NEOPLASM OF CECUM: Status: ACTIVE | Noted: 2023-04-27

## 2023-04-27 PROBLEM — K57.30 DIVERTICULOSIS OF COLON: Status: ACTIVE | Noted: 2023-04-27

## 2023-04-27 PROBLEM — Z86.0100 PERSONAL HISTORY OF COLONIC POLYPS: Status: ACTIVE | Noted: 2023-04-27

## 2023-04-27 PROBLEM — K64.8 INTERNAL HEMORRHOIDS: Status: ACTIVE | Noted: 2023-04-27

## 2023-04-27 PROBLEM — Z86.010 PERSONAL HISTORY OF COLONIC POLYPS: Status: ACTIVE | Noted: 2023-04-27

## 2023-11-02 RX ORDER — ROSUVASTATIN CALCIUM 20 MG/1
20 TABLET, COATED ORAL DAILY
Qty: 20 TABLET | Refills: 0 | Status: SHIPPED | OUTPATIENT
Start: 2023-11-02

## 2023-11-02 NOTE — TELEPHONE ENCOUNTER
A refill request was received for:  Requested Prescriptions     Pending Prescriptions Disp Refills    ROSUVASTATIN 20 MG Oral Tab [Pharmacy Med Name: ROSUVASTATIN CALCIUM 20 MG TAB] 90 tablet 3     Sig: TAKE 1 TABLET BY MOUTH EVERY DAY       Last refill date:   10-4-22    Last office visit: 8-4-22  yp    Follow up due:  No future appointments.

## 2023-11-30 RX ORDER — ROSUVASTATIN CALCIUM 20 MG/1
20 TABLET, COATED ORAL DAILY
Qty: 20 TABLET | Refills: 0 | OUTPATIENT
Start: 2023-11-30

## 2023-12-01 ENCOUNTER — TELEPHONE (OUTPATIENT)
Dept: FAMILY MEDICINE CLINIC | Facility: CLINIC | Age: 53
End: 2023-12-01

## 2023-12-01 RX ORDER — ROSUVASTATIN CALCIUM 20 MG/1
20 TABLET, COATED ORAL DAILY
Qty: 20 TABLET | Refills: 0 | OUTPATIENT
Start: 2023-12-01

## 2023-12-01 RX ORDER — ROSUVASTATIN CALCIUM 20 MG/1
20 TABLET, COATED ORAL DAILY
Qty: 30 TABLET | Refills: 0 | Status: SHIPPED | OUTPATIENT
Start: 2023-12-01

## 2023-12-01 NOTE — TELEPHONE ENCOUNTER
Pt requesting refill of Rosuvastatin script  Pt did schedule OV with Dr Nila Ford 12/14/23  Script refilled for 1 month

## 2023-12-01 NOTE — TELEPHONE ENCOUNTER
Patient calling in regards to script being denied. Informed patient that he has not been seen in over a year (August 2022)   Patient states that he is traveling and will schedule online but is almost out and is still requesting a refill. Please advise.

## 2023-12-26 ENCOUNTER — OFFICE VISIT (OUTPATIENT)
Dept: FAMILY MEDICINE CLINIC | Facility: CLINIC | Age: 53
End: 2023-12-26
Payer: COMMERCIAL

## 2023-12-26 VITALS
HEIGHT: 71 IN | DIASTOLIC BLOOD PRESSURE: 70 MMHG | WEIGHT: 175.38 LBS | HEART RATE: 78 BPM | BODY MASS INDEX: 24.55 KG/M2 | SYSTOLIC BLOOD PRESSURE: 114 MMHG | OXYGEN SATURATION: 97 %

## 2023-12-26 DIAGNOSIS — Z23 NEED FOR SHINGLES VACCINE: ICD-10-CM

## 2023-12-26 DIAGNOSIS — E55.9 VITAMIN D DEFICIENCY: ICD-10-CM

## 2023-12-26 DIAGNOSIS — Z00.00 ANNUAL PHYSICAL EXAM: Primary | ICD-10-CM

## 2023-12-26 PROCEDURE — 99396 PREV VISIT EST AGE 40-64: CPT | Performed by: FAMILY MEDICINE

## 2023-12-26 PROCEDURE — 3078F DIAST BP <80 MM HG: CPT | Performed by: FAMILY MEDICINE

## 2023-12-26 PROCEDURE — 3074F SYST BP LT 130 MM HG: CPT | Performed by: FAMILY MEDICINE

## 2023-12-26 PROCEDURE — 90750 HZV VACC RECOMBINANT IM: CPT | Performed by: FAMILY MEDICINE

## 2023-12-26 PROCEDURE — 90471 IMMUNIZATION ADMIN: CPT | Performed by: FAMILY MEDICINE

## 2023-12-26 PROCEDURE — 3008F BODY MASS INDEX DOCD: CPT | Performed by: FAMILY MEDICINE

## 2023-12-29 ENCOUNTER — LAB ENCOUNTER (OUTPATIENT)
Dept: LAB | Age: 53
End: 2023-12-29
Attending: FAMILY MEDICINE
Payer: COMMERCIAL

## 2023-12-29 ENCOUNTER — TELEPHONE (OUTPATIENT)
Dept: FAMILY MEDICINE CLINIC | Facility: CLINIC | Age: 53
End: 2023-12-29

## 2023-12-29 DIAGNOSIS — E55.9 VITAMIN D DEFICIENCY: ICD-10-CM

## 2023-12-29 DIAGNOSIS — E53.8 VITAMIN B12 DEFICIENCY: Primary | ICD-10-CM

## 2023-12-29 DIAGNOSIS — Z00.00 ANNUAL PHYSICAL EXAM: ICD-10-CM

## 2023-12-29 DIAGNOSIS — E53.8 VITAMIN B12 DEFICIENCY: ICD-10-CM

## 2023-12-29 LAB
BASOPHILS # BLD AUTO: 0.02 X10(3) UL (ref 0–0.2)
BASOPHILS NFR BLD AUTO: 0.4 %
EOSINOPHIL # BLD AUTO: 0.31 X10(3) UL (ref 0–0.7)
EOSINOPHIL NFR BLD AUTO: 5.8 %
ERYTHROCYTE [DISTWIDTH] IN BLOOD BY AUTOMATED COUNT: 12.7 %
HCT VFR BLD AUTO: 46.7 %
HGB BLD-MCNC: 15.4 G/DL
IMM GRANULOCYTES # BLD AUTO: 0.01 X10(3) UL (ref 0–1)
IMM GRANULOCYTES NFR BLD: 0.2 %
LYMPHOCYTES # BLD AUTO: 1.72 X10(3) UL (ref 1–4)
LYMPHOCYTES NFR BLD AUTO: 32.3 %
MCH RBC QN AUTO: 28.3 PG (ref 26–34)
MCHC RBC AUTO-ENTMCNC: 33 G/DL (ref 31–37)
MCV RBC AUTO: 85.7 FL
MONOCYTES # BLD AUTO: 0.64 X10(3) UL (ref 0.1–1)
MONOCYTES NFR BLD AUTO: 12 %
NEUTROPHILS # BLD AUTO: 2.63 X10 (3) UL (ref 1.5–7.7)
NEUTROPHILS # BLD AUTO: 2.63 X10(3) UL (ref 1.5–7.7)
NEUTROPHILS NFR BLD AUTO: 49.3 %
PLATELET # BLD AUTO: 210 10(3)UL (ref 150–450)
RBC # BLD AUTO: 5.45 X10(6)UL
VIT B12 SERPL-MCNC: 278 PG/ML (ref 193–986)
VIT D+METAB SERPL-MCNC: 24.3 NG/ML (ref 30–100)
WBC # BLD AUTO: 5.3 X10(3) UL (ref 4–11)

## 2023-12-29 PROCEDURE — 84153 ASSAY OF PSA TOTAL: CPT | Performed by: FAMILY MEDICINE

## 2023-12-29 PROCEDURE — 82306 VITAMIN D 25 HYDROXY: CPT | Performed by: FAMILY MEDICINE

## 2023-12-29 PROCEDURE — 80061 LIPID PANEL: CPT | Performed by: FAMILY MEDICINE

## 2023-12-29 PROCEDURE — 80050 GENERAL HEALTH PANEL: CPT | Performed by: FAMILY MEDICINE

## 2023-12-29 PROCEDURE — 82607 VITAMIN B-12: CPT | Performed by: FAMILY MEDICINE

## 2023-12-29 NOTE — TELEPHONE ENCOUNTER
Spoke with [employer], they cannot add vit b12- has to be protected from light. Patient notified, verbalized understanding.

## 2023-12-30 LAB
ALBUMIN SERPL-MCNC: 3.8 G/DL (ref 3.4–5)
ALBUMIN/GLOB SERPL: 1 {RATIO} (ref 1–2)
ALP LIVER SERPL-CCNC: 57 U/L
ALT SERPL-CCNC: 48 U/L
ANION GAP SERPL CALC-SCNC: 5 MMOL/L (ref 0–18)
AST SERPL-CCNC: 32 U/L (ref 15–37)
BILIRUB SERPL-MCNC: 0.6 MG/DL (ref 0.1–2)
BUN BLD-MCNC: 14 MG/DL (ref 9–23)
CALCIUM BLD-MCNC: 9.2 MG/DL (ref 8.5–10.1)
CHLORIDE SERPL-SCNC: 107 MMOL/L (ref 98–112)
CHOLEST SERPL-MCNC: 174 MG/DL (ref ?–200)
CO2 SERPL-SCNC: 28 MMOL/L (ref 21–32)
COMPLEXED PSA SERPL-MCNC: 0.48 NG/ML (ref ?–4)
CREAT BLD-MCNC: 1.13 MG/DL
EGFRCR SERPLBLD CKD-EPI 2021: 78 ML/MIN/1.73M2 (ref 60–?)
FASTING PATIENT LIPID ANSWER: YES
FASTING STATUS PATIENT QL REPORTED: YES
GLOBULIN PLAS-MCNC: 3.9 G/DL (ref 2.8–4.4)
GLUCOSE BLD-MCNC: 91 MG/DL (ref 70–99)
HDLC SERPL-MCNC: 45 MG/DL (ref 40–59)
LDLC SERPL CALC-MCNC: 110 MG/DL (ref ?–100)
NONHDLC SERPL-MCNC: 129 MG/DL (ref ?–130)
OSMOLALITY SERPL CALC.SUM OF ELEC: 290 MOSM/KG (ref 275–295)
POTASSIUM SERPL-SCNC: 4.6 MMOL/L (ref 3.5–5.1)
PROT SERPL-MCNC: 7.7 G/DL (ref 6.4–8.2)
SODIUM SERPL-SCNC: 140 MMOL/L (ref 136–145)
TRIGL SERPL-MCNC: 104 MG/DL (ref 30–149)
TSI SER-ACNC: 2.01 MIU/ML (ref 0.36–3.74)
VLDLC SERPL CALC-MCNC: 18 MG/DL (ref 0–30)

## 2024-01-08 RX ORDER — ROSUVASTATIN CALCIUM 20 MG/1
20 TABLET, COATED ORAL DAILY
Qty: 30 TABLET | Refills: 0 | Status: SHIPPED | OUTPATIENT
Start: 2024-01-08

## 2024-01-16 ENCOUNTER — WALK IN (OUTPATIENT)
Dept: URGENT CARE | Age: 54
End: 2024-01-16

## 2024-01-16 VITALS
SYSTOLIC BLOOD PRESSURE: 138 MMHG | HEART RATE: 103 BPM | TEMPERATURE: 96.7 F | OXYGEN SATURATION: 98 % | RESPIRATION RATE: 16 BRPM | DIASTOLIC BLOOD PRESSURE: 82 MMHG

## 2024-01-16 DIAGNOSIS — L08.9 SKIN INFECTION: Primary | ICD-10-CM

## 2024-01-16 PROCEDURE — 99203 OFFICE O/P NEW LOW 30 MIN: CPT | Performed by: INTERNAL MEDICINE

## 2024-01-16 PROCEDURE — 3075F SYST BP GE 130 - 139MM HG: CPT | Performed by: INTERNAL MEDICINE

## 2024-01-16 PROCEDURE — 3079F DIAST BP 80-89 MM HG: CPT | Performed by: INTERNAL MEDICINE

## 2024-01-16 RX ORDER — CLINDAMYCIN HYDROCHLORIDE 300 MG/1
300 CAPSULE ORAL 3 TIMES DAILY
Qty: 30 CAPSULE | Refills: 0 | Status: SHIPPED | OUTPATIENT
Start: 2024-01-16 | End: 2024-01-26

## 2024-01-16 ASSESSMENT — PAIN SCALES - GENERAL: PAINLEVEL: 6

## 2024-02-06 RX ORDER — LISINOPRIL AND HYDROCHLOROTHIAZIDE 12.5; 1 MG/1; MG/1
1 TABLET ORAL DAILY
Qty: 90 TABLET | Refills: 3 | Status: SHIPPED | OUTPATIENT
Start: 2024-02-06

## 2024-07-22 ENCOUNTER — WALK IN (OUTPATIENT)
Dept: URGENT CARE | Age: 54
End: 2024-07-22

## 2024-07-22 VITALS
DIASTOLIC BLOOD PRESSURE: 73 MMHG | HEART RATE: 69 BPM | SYSTOLIC BLOOD PRESSURE: 138 MMHG | RESPIRATION RATE: 16 BRPM | TEMPERATURE: 96.8 F | OXYGEN SATURATION: 97 %

## 2024-07-22 DIAGNOSIS — L50.9 HIVES: Primary | ICD-10-CM

## 2024-07-22 RX ORDER — HYDROXYZINE HYDROCHLORIDE 25 MG/1
25 TABLET, FILM COATED ORAL EVERY 8 HOURS PRN
Qty: 30 TABLET | Refills: 1 | Status: SHIPPED | OUTPATIENT
Start: 2024-07-22 | End: 2024-07-22 | Stop reason: RX

## 2024-07-22 RX ORDER — PREDNISONE 50 MG/1
50 TABLET ORAL DAILY
Qty: 5 TABLET | Refills: 0 | Status: SHIPPED | OUTPATIENT
Start: 2024-07-22 | End: 2024-07-22 | Stop reason: RX

## 2025-01-24 ENCOUNTER — HOSPITAL ENCOUNTER (OUTPATIENT)
Age: 55
Discharge: HOME | End: 2025-01-24
Payer: COMMERCIAL

## 2025-01-24 VITALS
HEART RATE: 112 BPM | WEIGHT: 175 LBS | SYSTOLIC BLOOD PRESSURE: 143 MMHG | TEMPERATURE: 98 F | HEIGHT: 71 IN | OXYGEN SATURATION: 99 % | RESPIRATION RATE: 16 BRPM | DIASTOLIC BLOOD PRESSURE: 91 MMHG | BODY MASS INDEX: 24.5 KG/M2

## 2025-01-24 DIAGNOSIS — S03.40XA SPRAIN OF TEMPOROMANDIBULAR JOINT, INITIAL ENCOUNTER: Primary | ICD-10-CM

## 2025-01-24 PROCEDURE — 96372 THER/PROPH/DIAG INJ SC/IM: CPT

## 2025-01-24 PROCEDURE — 99214 OFFICE O/P EST MOD 30 MIN: CPT

## 2025-01-24 PROCEDURE — 99203 OFFICE O/P NEW LOW 30 MIN: CPT

## 2025-01-24 RX ORDER — KETOROLAC TROMETHAMINE 30 MG/ML
15 INJECTION, SOLUTION INTRAMUSCULAR; INTRAVENOUS ONCE
Status: COMPLETED | OUTPATIENT
Start: 2025-01-24 | End: 2025-01-24

## 2025-01-24 RX ORDER — PREDNISONE 20 MG/1
40 TABLET ORAL DAILY
Qty: 10 TABLET | Refills: 0 | Status: ON HOLD | OUTPATIENT
Start: 2025-01-24 | End: 2025-01-29

## 2025-01-24 NOTE — ED INITIAL ASSESSMENT (HPI)
C/o 2 weeks of jaw pain and headache right side. Seen dentist was mouth xray ok. This week had CT of the mouth. Headache persistent.

## 2025-01-24 NOTE — ED PROVIDER NOTES
Patient Seen in: [facility]      History     Chief Complaint   Patient presents with    Headache     Severe headache due to TMJ (Temporomandibular joint dysfunction) - Entered by patient    Jaw Pain     Stated Complaint: Headache - Severe headache due to TMJ (Temporomandibular joint dysfunction)    Subjective:   HPI      54-year-old male with hypertension and dyslipidemia presents today with complaints of right sided jaw pain for 2 weeks.  Patient states that he did see a dentist and was diagnosed with TMJ.  Patient states that he was prescribed a muscle relaxer but states he still continues to have the jaw pain and continues to grind his teeth in his sleep.  Patient states that he has had a CT of his jaw with no acute abnormalities to the CT.  Patient states that the plan is to have a bite guard made for him.  Patient denies any social determinants impacting his jaw pain.    Objective:     Past Medical History:    Allergic rhinitis    Essential hypertension    Hemorrhoids    High cholesterol    Hyperlipidemia              Past Surgical History:   Procedure Laterality Date    Colonoscopy                  Social History     Socioeconomic History    Marital status:    Tobacco Use    Smoking status: Never    Smokeless tobacco: Never   Vaping Use    Vaping status: Never Used   Substance and Sexual Activity    Alcohol use: Never    Drug use: Never   Other Topics Concern    Caffeine Concern No    Exercise Yes    Seat Belt Yes    Special Diet No    Stress Concern No    Weight Concern No     Social Drivers of Health     Physical Activity: Unknown (7/31/2020)    [facility]    Exercise Vital Sign     Days of Exercise per Week: 0 days     Minutes of Exercise per Session: Not asked              Review of Systems   Constitutional: Negative.    HENT:          Jaw pain   Eyes: Negative.    Respiratory: Negative.     Cardiovascular: Negative.     Gastrointestinal: Negative.    Endocrine: Negative.    Genitourinary: Negative.    Musculoskeletal: Negative.    Skin: Negative.    Allergic/Immunologic: Negative.    Neurological: Negative.    Hematological: Negative.    Psychiatric/Behavioral: Negative.         Positive for stated complaint: Headache - Severe headache due to TMJ (Temporomandibular joint dysfunction)  Other systems are as noted in HPI.  Constitutional and vital signs reviewed.      All other systems reviewed and negative except as noted above.    Physical Exam     ED Triage Vitals [01/24/25 0911]   BP (!) 143/91   Pulse 112   Resp 16   Temp 97.8 °F (36.6 °C)   Temp src    SpO2 99 %   O2 Device None (Room air)       Current Vitals:   Vital Signs  BP: (!) 143/91  Pulse: 112  Resp: 16  Temp: 97.8 °F (36.6 °C)    Oxygen Therapy  SpO2: 99 %  O2 Device: None (Room air)        Physical Exam  Vitals and nursing note reviewed.   Constitutional:       Appearance: Normal appearance. He is normal weight.   HENT:      Head: Normocephalic and atraumatic.      Right Ear: External ear normal.      Left Ear: External ear normal.      Nose: Nose normal.      Mouth/Throat:      Mouth: Mucous membranes are moist.      Pharynx: Oropharynx is clear.      Comments: Molar teeth bilaterally lower portion appear dull.  Patient has tenderness to palpation over the right TMJ joint tendon line.  Pain is elicited when patient opens his mouth and bites down hard.  Eyes:      Extraocular Movements: Extraocular movements intact.      Conjunctiva/sclera: Conjunctivae normal.      Pupils: Pupils are equal, round, and reactive to light.   Pulmonary:      Effort: Pulmonary effort is normal.   Musculoskeletal:      Cervical back: Normal range of motion and neck supple.   Skin:     Capillary Refill: Capillary refill takes less than 2 seconds.   Neurological:      General: No focal deficit present.      Mental Status: He is alert.   Psychiatric:         Mood and Affect: Mood normal.            ED Course   Labs Reviewed - No data to display                MDM      54-year-old male with hypertension and dyslipidemia presents today with complaints of right sided jaw pain for 2 weeks.  Patient states that he did see a dentist and was diagnosed with TMJ.  Patient states that he was prescribed a muscle relaxer but states he still continues to have the jaw pain and continues to grind his teeth in his sleep.  Patient states that he has had a CT of his jaw with no acute abnormalities to the CT.  Patient states that the plan is to have a bite guard made for him.  Patient denies any social determinants impacting his jaw pain.  Vital signs: Please see EMR.  Physical exam: Please see exam.  Differential diagnosis: TMJ, tooth fracture, headache.  Based on physical exam and HPI will diagnosed with TMJ.  Patient dosed with Toradol while here in the clinic.  Patient instructed to continue taking his muscle relaxer and will add on a steroid dose for 5 days.  Patient instructed to follow-up with dentist to have bite plate made for him.  ED precautions given.        Medical Decision Making  54-year-old male with hypertension and dyslipidemia presents today with complaints of right sided jaw pain for 2 weeks.  Patient states that he did see a dentist and was diagnosed with TMJ.  Patient states that he was prescribed a muscle relaxer but states he still continues to have the jaw pain and continues to grind his teeth in his sleep.  Patient states that he has had a CT of his jaw with no acute abnormalities to the CT.  Patient states that the plan is to have a bite guard made for him.  Patient denies any social determinants impacting his jaw pain.    Problems Addressed:  Sprain of temporomandibular joint, initial encounter: acute illness or injury    Amount and/or Complexity of Data Reviewed  ECG/medicine tests: ordered. Decision-making details documented in ED Course.    Risk  Prescription drug  management.        Disposition and Plan     Clinical Impression:  1. Sprain of temporomandibular joint, initial encounter         Disposition:  Discharge  1/24/2025  9:31 am    Follow-up:  Yesica Schwarz DO  [address] [place] [id number] [phone]    In 1 week      [facility]  [facility] [id number] [phone]  Schedule an appointment as soon as possible for a visit             Medications Prescribed:  Current Discharge Medication List        START taking these medications    Details   predniSONE 20 MG Oral Tab Take 2 tablets (40 mg total) by mouth daily for 5 days.  Qty: 10 tablet, Refills: 0                 Supplementary Documentation:

## 2025-01-30 ENCOUNTER — HOSPITAL ENCOUNTER (OUTPATIENT)
Age: 55
Discharge: ANOTHER HOSPITAL | DRG: 024 | End: 2025-01-30
Attending: EMERGENCY MEDICINE
Payer: COMMERCIAL

## 2025-01-30 ENCOUNTER — HOSPITAL ENCOUNTER (INPATIENT)
Facility: HOSPITAL | Age: 55
LOS: 5 days | Discharge: HOME | DRG: 024 | End: 2025-02-04
Attending: EMERGENCY MEDICINE | Admitting: HOSPITALIST
Payer: COMMERCIAL

## 2025-01-30 ENCOUNTER — HOSPITAL ENCOUNTER (OUTPATIENT)
Age: 55
Discharge: ANOTHER HOSPITAL | End: 2025-01-30
Attending: EMERGENCY MEDICINE
Payer: COMMERCIAL

## 2025-01-30 ENCOUNTER — APPOINTMENT (OUTPATIENT)
Dept: CT IMAGING | Age: 55
DRG: 024 | End: 2025-01-30
Attending: EMERGENCY MEDICINE
Payer: COMMERCIAL

## 2025-01-30 ENCOUNTER — APPOINTMENT (OUTPATIENT)
Dept: MRI IMAGING | Age: 55
DRG: 024 | End: 2025-01-30
Attending: EMERGENCY MEDICINE
Payer: COMMERCIAL

## 2025-01-30 VITALS
DIASTOLIC BLOOD PRESSURE: 95 MMHG | SYSTOLIC BLOOD PRESSURE: 154 MMHG | RESPIRATION RATE: 18 BRPM | TEMPERATURE: 98 F | HEART RATE: 110 BPM | OXYGEN SATURATION: 97 %

## 2025-01-30 DIAGNOSIS — G93.89 BRAIN MASS: Primary | ICD-10-CM

## 2025-01-30 DIAGNOSIS — G93.9 BRAIN LESION: ICD-10-CM

## 2025-01-30 DIAGNOSIS — R51.9 HEADACHE, UNSPECIFIED HEADACHE TYPE: Primary | ICD-10-CM

## 2025-01-30 LAB
ALBUMIN SERPL-MCNC: 4.7 G/DL (ref 3.2–4.8)
ALBUMIN/GLOB SERPL: 1.4 {RATIO} (ref 1–2)
ALP LIVER SERPL-CCNC: 86 U/L
ALT SERPL-CCNC: 50 U/L
ANION GAP SERPL CALC-SCNC: 6 MMOL/L (ref 0–18)
AST SERPL-CCNC: 16 U/L (ref ?–34)
BASOPHILS # BLD AUTO: 0.02 X10(3) UL (ref 0–0.2)
BASOPHILS NFR BLD AUTO: 0.1 %
BILIRUB SERPL-MCNC: 0.7 MG/DL (ref 0.3–1.2)
BUN BLD-MCNC: 10 MG/DL (ref 9–23)
CALCIUM BLD-MCNC: 9.7 MG/DL (ref 8.7–10.6)
CHLORIDE SERPL-SCNC: 100 MMOL/L (ref 98–112)
CO2 SERPL-SCNC: 28 MMOL/L (ref 21–32)
CREAT BLD-MCNC: 0.98 MG/DL
CRP SERPL-MCNC: 0.4 MG/DL (ref ?–0.5)
EGFRCR SERPLBLD CKD-EPI 2021: 92 ML/MIN/1.73M2 (ref 60–?)
EOSINOPHIL # BLD AUTO: 0.01 X10(3) UL (ref 0–0.7)
EOSINOPHIL NFR BLD AUTO: 0.1 %
ERYTHROCYTE [DISTWIDTH] IN BLOOD BY AUTOMATED COUNT: 12.3 %
ERYTHROCYTE [SEDIMENTATION RATE] IN BLOOD: 17 MM/HR
GLOBULIN PLAS-MCNC: 3.4 G/DL (ref 2–3.5)
GLUCOSE BLD-MCNC: 115 MG/DL (ref 70–99)
HCT VFR BLD AUTO: 44.8 %
HGB BLD-MCNC: 15.8 G/DL
IMM GRANULOCYTES # BLD AUTO: 0.07 X10(3) UL (ref 0–1)
IMM GRANULOCYTES NFR BLD: 0.5 %
LACTATE SERPL-SCNC: 1.4 MMOL/L (ref 0.5–2)
LYMPHOCYTES # BLD AUTO: 1.22 X10(3) UL (ref 1–4)
LYMPHOCYTES NFR BLD AUTO: 8.3 %
MCH RBC QN AUTO: 28.3 PG (ref 26–34)
MCHC RBC AUTO-ENTMCNC: 35.3 G/DL (ref 31–37)
MCV RBC AUTO: 80.3 FL
MONOCYTES # BLD AUTO: 1.13 X10(3) UL (ref 0.1–1)
MONOCYTES NFR BLD AUTO: 7.7 %
NEUTROPHILS # BLD AUTO: 12.19 X10 (3) UL (ref 1.5–7.7)
NEUTROPHILS # BLD AUTO: 12.19 X10(3) UL (ref 1.5–7.7)
NEUTROPHILS NFR BLD AUTO: 83.3 %
OSMOLALITY SERPL CALC.SUM OF ELEC: 278 MOSM/KG (ref 275–295)
PLATELET # BLD AUTO: 361 10(3)UL (ref 150–450)
POTASSIUM SERPL-SCNC: 3.9 MMOL/L (ref 3.5–5.1)
PROCALCITONIN SERPL-MCNC: 0.06 NG/ML (ref ?–0.05)
PROT SERPL-MCNC: 8.1 G/DL (ref 5.7–8.2)
RBC # BLD AUTO: 5.58 X10(6)UL
SODIUM SERPL-SCNC: 134 MMOL/L (ref 136–145)
WBC # BLD AUTO: 14.6 X10(3) UL (ref 4–11)

## 2025-01-30 PROCEDURE — 70496 CT ANGIOGRAPHY HEAD: CPT | Performed by: EMERGENCY MEDICINE

## 2025-01-30 PROCEDURE — 99223 1ST HOSP IP/OBS HIGH 75: CPT | Performed by: HOSPITALIST

## 2025-01-30 PROCEDURE — 70553 MRI BRAIN STEM W/O & W/DYE: CPT | Performed by: EMERGENCY MEDICINE

## 2025-01-30 PROCEDURE — 99213 OFFICE O/P EST LOW 20 MIN: CPT

## 2025-01-30 RX ORDER — POLYETHYLENE GLYCOL 3350 17 G/17G
17 POWDER, FOR SOLUTION ORAL DAILY PRN
Status: DISCONTINUED | OUTPATIENT
Start: 2025-01-30 | End: 2025-02-04

## 2025-01-30 RX ORDER — IOPAMIDOL 755 MG/ML
60 INJECTION, SOLUTION INTRAVASCULAR
Status: COMPLETED | OUTPATIENT
Start: 2025-01-30 | End: 2025-01-30

## 2025-01-30 RX ORDER — CYCLOBENZAPRINE HCL 10 MG
10 TABLET ORAL EVERY 6 HOURS PRN
COMMUNITY
Start: 2025-01-20 | End: 2025-02-04

## 2025-01-30 RX ORDER — SENNOSIDES 8.6 MG
17.2 TABLET ORAL NIGHTLY PRN
Status: DISCONTINUED | OUTPATIENT
Start: 2025-01-30 | End: 2025-02-04

## 2025-01-30 RX ORDER — VANCOMYCIN 2 GRAM/500 ML IN 0.9 % SODIUM CHLORIDE INTRAVENOUS
25 ONCE
Status: COMPLETED | OUTPATIENT
Start: 2025-01-30 | End: 2025-01-30

## 2025-01-30 RX ORDER — DIPHENHYDRAMINE HYDROCHLORIDE 50 MG/ML
12.5 INJECTION INTRAMUSCULAR; INTRAVENOUS ONCE
Status: COMPLETED | OUTPATIENT
Start: 2025-01-30 | End: 2025-01-30

## 2025-01-30 RX ORDER — LEVETIRACETAM 500 MG/5ML
500 INJECTION, SOLUTION, CONCENTRATE INTRAVENOUS EVERY 12 HOURS
Status: DISCONTINUED | OUTPATIENT
Start: 2025-01-31 | End: 2025-02-04

## 2025-01-30 RX ORDER — PROCHLORPERAZINE EDISYLATE 5 MG/ML
5 INJECTION INTRAMUSCULAR; INTRAVENOUS EVERY 8 HOURS PRN
Status: DISCONTINUED | OUTPATIENT
Start: 2025-01-30 | End: 2025-02-04

## 2025-01-30 RX ORDER — ROSUVASTATIN CALCIUM 10 MG/1
20 TABLET, COATED ORAL NIGHTLY
Status: DISCONTINUED | OUTPATIENT
Start: 2025-01-31 | End: 2025-02-04

## 2025-01-30 RX ORDER — ACETAMINOPHEN 500 MG
1000 TABLET ORAL ONCE
Status: COMPLETED | OUTPATIENT
Start: 2025-01-30 | End: 2025-01-30

## 2025-01-30 RX ORDER — GADOTERATE MEGLUMINE 376.9 MG/ML
27 INJECTION INTRAVENOUS
Status: COMPLETED | OUTPATIENT
Start: 2025-01-30 | End: 2025-01-30

## 2025-01-30 RX ORDER — ACETAMINOPHEN 500 MG
500 TABLET ORAL EVERY 4 HOURS PRN
Status: DISCONTINUED | OUTPATIENT
Start: 2025-01-30 | End: 2025-02-04

## 2025-01-30 RX ORDER — BISACODYL 10 MG
10 SUPPOSITORY, RECTAL RECTAL
Status: DISCONTINUED | OUTPATIENT
Start: 2025-01-30 | End: 2025-02-04

## 2025-01-30 RX ORDER — METOCLOPRAMIDE HYDROCHLORIDE 5 MG/ML
5 INJECTION INTRAMUSCULAR; INTRAVENOUS ONCE
Status: COMPLETED | OUTPATIENT
Start: 2025-01-30 | End: 2025-01-30

## 2025-01-30 RX ORDER — ONDANSETRON 2 MG/ML
4 INJECTION INTRAMUSCULAR; INTRAVENOUS EVERY 4 HOURS PRN
Status: DISCONTINUED | OUTPATIENT
Start: 2025-01-30 | End: 2025-01-30 | Stop reason: HOSPADM

## 2025-01-30 RX ORDER — LISINOPRIL 10 MG/1
10 TABLET ORAL DAILY
Status: DISCONTINUED | OUTPATIENT
Start: 2025-01-31 | End: 2025-02-04

## 2025-01-30 RX ORDER — ECHINACEA PURPUREA EXTRACT 125 MG
1 TABLET ORAL
Status: DISCONTINUED | OUTPATIENT
Start: 2025-01-30 | End: 2025-02-04

## 2025-01-30 RX ORDER — ONDANSETRON 2 MG/ML
4 INJECTION INTRAMUSCULAR; INTRAVENOUS EVERY 6 HOURS PRN
Status: DISCONTINUED | OUTPATIENT
Start: 2025-01-30 | End: 2025-02-04

## 2025-01-30 RX ORDER — SODIUM PHOSPHATE, DIBASIC AND SODIUM PHOSPHATE, MONOBASIC 7; 19 G/230ML; G/230ML
1 ENEMA RECTAL ONCE AS NEEDED
Status: DISCONTINUED | OUTPATIENT
Start: 2025-01-30 | End: 2025-02-04

## 2025-01-30 RX ORDER — LEVETIRACETAM 500 MG/5ML
500 INJECTION, SOLUTION, CONCENTRATE INTRAVENOUS ONCE
Status: COMPLETED | OUTPATIENT
Start: 2025-01-30 | End: 2025-01-30

## 2025-01-30 NOTE — ED PROVIDER NOTES
Patient Seen in: [facility]      History     Chief Complaint   Patient presents with    Headache     Severe headache and nausea - Entered by patient    Nausea/vomiting     Stated Complaint: Headache - Severe headache and nausea    Subjective:   HPI      Patient was to urgent care 4 days ago complaining of right-sided jaw pain for 2 weeks.  Patient saw dentist and was diagnosed with TMJ.  Patient was treated with muscle relaxant but the symptoms persist.  Imaging was performed and no acute abnormality identified.  Patient was also using a bite guard.  Patient was prescribed prednisone at that visit.  Patient reports that his symptoms never completely went away.  They were a little bit better.  He completed the prednisone on Tuesday.  On Wednesday, the pain was more intense.  He describes pain around the temporal scalp and states that it radiates around into his occiput.  No visual change.  No focal numbness or weakness of the body.  No neck pain.  No spinning dizziness.  Patient describes it as intense throbbing pain that is quite disabling.  He states that it feels a little bit better when he lays down and he has not been able to do much because of the discomfort.  Patient denies any cold or sinus symptoms.      Objective:     Past Medical History:    Allergic rhinitis    Essential hypertension    Hemorrhoids    High cholesterol    Hyperlipidemia              Past Surgical History:   Procedure Laterality Date    Colonoscopy                  Social History     Socioeconomic History    Marital status:    Tobacco Use    Smoking status: Never    Smokeless tobacco: Never   Vaping Use    Vaping status: Never Used   Substance and Sexual Activity    Alcohol use: Never    Drug use: Never   Other Topics Concern    Caffeine Concern No    Exercise Yes    Seat Belt Yes    Special Diet No    Stress Concern No    Weight Concern No     Social Drivers of Health     Physical Activity: Unknown (7/31/2020)     Received from [facility], [facility]    Exercise Vital Sign     Days of Exercise per Week: 0 days     Minutes of Exercise per Session: Not asked              Review of Systems    Positive for stated complaint: Headache - Severe headache and nausea  Other systems are as noted in HPI.  Constitutional and vital signs reviewed.      All other systems reviewed and negative except as noted above.    Physical Exam     ED Triage Vitals [01/30/25 1143]   BP (!) 154/95   Pulse 110   Resp 18   Temp 98 °F (36.7 °C)   Temp src Oral   SpO2 97 %   O2 Device None (Room air)       Current Vitals:   Vital Signs  BP: (!) 154/95  Pulse: 110  Resp: 18  Temp: 98 °F (36.7 °C)  Temp src: Oral    Oxygen Therapy  SpO2: 97 %  O2 Device: None (Room air)        Physical Exam  General: The patient is awake, alert, conversant.  Face is symmetric.  No extraordinary tenderness over the temporal scalp.    Skin: No rash in the area patient discomfort  Eyes: sclera white, conjunctiva pink and moist.  Lids and lashes are normal.  Pupils are equal, round, and reactive to light  Throat: Posterior pharynx is normal.  Tongue protrudes midline and soft palate rise appropriately  Neck: Supple  Neurologic:  Mental status as above.  Cranial nerves as noted above.  Patient moves all extremities with good strength and coordination.        ED Course   Labs Reviewed - No data to display                MDM     Patient complains of intense headache over the temporal scalp that radiates into the occiput.  This seems atypical of the common presentation of TMJ syndrome.  He describes it as throbbing.  Intracranial abnormality such as aneurysm include the differential.  CTA imaging warranted.  Also, temporal arteritis include the differential.  Sed rate could screen for this.  I recommend evaluation the hospital emergency department        Medical Decision Making      Disposition and Plan     Clinical Impression:  1. Headache, unspecified  headache type         Disposition:  Ic to ed  1/30/2025 11:54 am    Follow-up:  No follow-up provider specified.        Medications Prescribed:  Current Discharge Medication List              Supplementary Documentation:

## 2025-01-30 NOTE — ED INITIAL ASSESSMENT (HPI)
Pt states he was sent from [facility] for evaluation of headache. Pt states he went to [facility] last week for this headache and was dx with tmj, pt was placed on steroids which he states helped his sxs but pain returned after he completed steroids on Tuesday. This am pt had 2 episodes of vomiting. Headache is relieved with tylenol this am and is now a 1/10.

## 2025-01-30 NOTE — ED PROVIDER NOTES
Patient Seen in: [facility] In [place]      History     Chief Complaint   Patient presents with    Headache     Stated Complaint: headache and vomiting sent from     Subjective:   HPI      54-year-old male with past medical history of hypertension presents today for evaluation of a headache.  2 weeks ago, patient had a pain in his right jaw.  He was seen by his dentist and diagnosed with TMJ.  The right-sided TMJ discomfort radiated to his right temporal area.  He was seen at the immediate care on January 24.  He was placed on steroids and his pain resolved.  His last dose was on Tuesday and the following day, patient had recurrence of the discomfort.  He has no temporal pain presently.  He reports the pain is a generalized headache.  There is no neck pain, syncope or thunderclap etiology.  He denies any loss of vision or blurred vision.  There is no shoulder joint pain.  The only new symptom he experienced was some nausea along with vomiting.  He has no numbness, tingling, weakness or any other physical symptoms.  He was seen at the immediate care and advised to come to the ER for evaluation.    Objective:     Past Medical History:    Allergic rhinitis    Essential hypertension    Hemorrhoids    High cholesterol    Hyperlipidemia              Past Surgical History:   Procedure Laterality Date    Colonoscopy                  Social History     Socioeconomic History    Marital status:    Tobacco Use    Smoking status: Never    Smokeless tobacco: Never   Vaping Use    Vaping status: Never Used   Substance and Sexual Activity    Alcohol use: Never    Drug use: Never   Other Topics Concern    Caffeine Concern No    Exercise Yes    Seat Belt Yes    Special Diet No    Stress Concern No    Weight Concern No     Social Drivers of Health     Food Insecurity: No Food Insecurity (1/30/2025)    Food Insecurity     Food Insecurity: Never true   Transportation Needs: No Transportation Needs  (1/30/2025)    Transportation Needs     Lack of Transportation: No   Physical Activity: Unknown (7/31/2020)    Received [facility] [employer] [employer]    Exercise Vital Sign     Days of Exercise per Week: 0 days     Minutes of Exercise per Session: Not asked   Housing Stability: Low Risk  (1/30/2025)    Housing Stability     Housing Instability: No                  Physical Exam     ED Triage Vitals [01/30/25 1230]   /90   Pulse 100   Resp 20   Temp 98.3 °F (36.8 °C)   Temp src Temporal   SpO2 97 %   O2 Device None (Room air)       Current Vitals:   Vital Signs  BP: 139/78  Pulse: 94  Resp: 18  Temp: 100.3 °F (37.9 °C)  Temp src: Axillary  MAP (mmHg): 93    Oxygen Therapy  SpO2: 97 %  O2 Device: None (Room air)  Pulse Oximetry Type: Continuous  Oximetry Probe Site Changed: No  Pulse Ox Probe Location: Left hand        Physical Exam  Vitals and nursing note reviewed.   Constitutional:       Appearance: Normal appearance.   HENT:      Head: Normocephalic.      Comments: No temporal tenderness     Nose: Nose normal.      Mouth/Throat:      Mouth: Mucous membranes are moist.   Eyes:      Extraocular Movements: Extraocular movements intact.   Cardiovascular:      Rate and Rhythm: Normal rate.   Pulmonary:      Effort: Pulmonary effort is normal.   Abdominal:      General: Abdomen is flat.   Musculoskeletal:         General: Normal range of motion.   Skin:     General: Skin is warm.   Neurological:      General: No focal deficit present.      Mental Status: He is alert.      Cranial Nerves: No cranial nerve deficit.      Sensory: No sensory deficit.      Motor: No weakness.      Coordination: Coordination normal.   Psychiatric:         Mood and Affect: Mood normal.          ED Course     Labs Reviewed   CBC WITH DIFFERENTIAL WITH PLATELET - Abnormal; Notable for the following components:       Result Value    WBC 14.6 (*)     Neutrophil Absolute Prelim 12.19 (*)     Neutrophil Absolute 12.19 (*)      Monocyte Absolute 1.13 (*)     All other components within normal limits   COMP METABOLIC PANEL (14) - Abnormal; Notable for the following components:    Glucose 115 (*)     Sodium 134 (*)     ALT 50 (*)     All other components within normal limits   PROCALCITONIN - Abnormal; Notable for the following components:    Procalcitonin 0.06 (*)     All other components within normal limits   SED RATE, WESTERGREN (AUTOMATED) - Normal   C-REACTIVE PROTEIN - Normal   LACTIC ACID, PLASMA - Normal   BLOOD CULTURE   BLOOD CULTURE            MRI BRAIN (W+WO) (CPT=70553)    Result Date: 1/30/2025  PROCEDURE:  MRI BRAIN (W+WO) (CPT=70553)  COMPARISON:  None.  INDICATIONS:  headache and vomiting sent from IC  TECHNIQUE:  MRI of the brain was performed with multi-planar T1, T2-weighted images with FLAIR sequences and diffusion weighted images without and with infusion.  PATIENT STATED HISTORY:(As transcribed by Technologist)  Patient complains of headaches and nausea for several days and states abnormal CT today   CONTRAST USED:  27 mL of Dotarem  FINDINGS:  INTRACRANIAL:  A ring-enhancing lesion is centered on the right temporal lobe in the area of concern noted on recent CT.  This measures approximately 2.7 x 2.2 x 2.4 cm.  There is restricted diffusion present centrally.  A possible enhancing mural nodule  is present anteriorly measuring approximately 6 mm.  There is possible extension into the adjacent dura with some probable pachymeningeal enhancement as well.  Associated FLAIR signal hyperintensity surrounding this lesion most likely represents edema.  No significant midline shift is present. VENTRICLES/SULCI:   Ventricles and sulci are normal in caliber.  There are no extra-axial fluid collections.  There is no midline shift. SINUSES/ORBITS:       The visualized paranasal sinuses are clear.  The orbits are unremarkable. MASTOIDS:       The mastoids are clear.            CONCLUSION:  The known lesion of the right temporal  lobe demonstrates peripheral enhancement with probable adjacent pachymeningeal enhancement and dural thickening.  There is central restricted diffusion.  Differential considerations would include a cerebral abscess or glioblastoma, with other etiologies such as metastasis, lymphoma and tumefactive demyelinating disease possible but felt to be less likely.  Critical results were discussed with Dr. Farmer by Dr. Correa at approximately 1817 on 1/30/25.  Read back was performed.    LOCATION:  [facility]    Dictated by (CST): Garret Correa MD on 1/30/2025 at 6:08 PM     Finalized by (CST): Garret Correa MD on 1/30/2025 at 6:18 PM       CTA BRAIN (CPT=70496)    Result Date: 1/30/2025  PROCEDURE:  CTA BRAIN (CPT=70496)  COMPARISON:  None.  INDICATIONS:  headache and vomiting sent from IC  TECHNIQUE:  Unenhanced followed by contrast enhanced multislice CT angiography of the brain vasculature using non-ionic contrast. Multiplanar 3D reformatted imaging as well as multiplanar MIP images were obtained. Dose reduction techniques were used. Dose information is transmitted to the ACR (American College of Radiology) NRDR (National Radiology Data Registry) which includes the Dose Index Registry.  PATIENT STATED HISTORY:(As transcribed by Technologist)    headache and vomiting   CONTRAST USED:  60cc of Isovue 370  FINDINGS:  No hydrocephalus.  The basal cisterns are patent.  There is a rounded hypodense mass at the anterior inferior right temporal lobe measuring 2.6 x 1.9 x 2.3 cm.  There is hypoattenuation surrounding this mass within the anterior right temporal lobe.  There is mild mass effect on the surrounding sulci.  There is no acute intracranial hemorrhage or extra-axial fluid collection.   There is no evident fracture.  The visualized paranasal sinuses and mastoid air cells are unremarkable.   There is a nonspecific focus of sclerosis within the right parietal bone.  This could represent a bone island among other etiologies.  The  upper cervical, petrous, cavernous, and supraclinoid internal carotid arteries are unremarkable.  An anterior communicating artery is seen.  The branches of the anterior cerebral and middle cerebral arteries are unremarkable.  A small right posterior  communicating artery is seen along with a moderate-sized left posterior communicating artery.  The branches of the posterior cerebral and superior cerebellar arteries are unremarkable.  The basilar artery has a normal course and caliber.  The bilateral vertebral arteries are unremarkable.              CONCLUSION:  1. There is a 2.6 cm hypodense mass within the anterior inferior right temporal lobe.  There is surrounding hypoattenuation within the anterior right temporal lobe which may represent edema.  This mass may represent abscess, primary glial neoplasm, or metastatic disease.  A contrast-enhanced brain MRI is recommended for further assessment. 2. No high-grade stenosis, occlusion, or aneurysm is identified within the major intracranial arterial vasculature.    Critical results were discussed with Dr. Farmer at 1633 hours on 1/30/2025. Critical results were read back.   LOCATION:  [facility]   Dictated by (CST): Stromberg, LeRoy, MD on 1/30/2025 at 4:16 PM     Finalized by (CST): Stromberg, LeRoy, MD on 1/30/2025 at 4:37 PM             MDM      Differential Diagnosis  54-year-old male presents today for evaluation of a headache.  He was diagnosed with TMJ 2 weeks ago and initially had some pain that radiate to his right temple area.  That pain resolved. There was no thunderclap etiology, neck pain or syncope.  He presently reports a generalized headache.  He has normal strength in all 4 extremities.  His gross sensation is intact.  His speech is clear and fluid and his cranial nerves appear normal.  He has no ataxia.  There is no meningismus.  On review of the immediate care note from earlier today, there was concern for the possibility of aneurysm, will obtain  CTA of the head.  This will also allow for assessment of intracranial mass or hemorrhage.  There is concern for temporal arteritis although patient does not have any joint pain, vision changes or temporal pain/tenderness to make me suspect this strongly at this time.  Will send off inflammatory markers.  Of consideration as well would be tension headache.  Will treat him symptomatically and obtain labs/imaging.    ------  Patient CT was concerning for an intracranial mass.  Differential would include abscess, GBM versus metastatic disease.  I reviewed case with neurosurgery Dr. Baxter.  He recommended infectious workup with blood cultures, empiric Keppra for seizure prophylaxis along with MRI of the brain for better evaluation of this mass.  Patient was updated on this finding and agreeable to admission.  I spoke with the hospitalist in regards admission.    Discussions of Management  Case reviewed with neurosurgery along with the hospitalist    Admission disposition: 1/30/2025  5:06 PM           Medical Decision Making      Disposition and Plan     Clinical Impression:  1. Brain mass         Disposition:  Admit  1/30/2025  5:06 pm    Follow-up:  No follow-up provider specified.        Medications Prescribed:  Current Discharge Medication List              Supplementary Documentation:         Hospital Problems       Present on Admission  Date Reviewed: 1/24/2025            ICD-10-CM Noted POA    * (Principal) Brain mass G93.89 1/30/2025 Unknown

## 2025-01-30 NOTE — H&P
[facility]  History and Physical     Devora Hess Patient Status:  Emergency    1970 MRN [id number]   Location [facility] IN [place] Attending Sebastián Farmer*   Hosp Day # 0 PCP Yesica Schwarz DO     Chief Complaint: Headaches, vomiting     Subjective:    History of Present Illness:     Devora Hess is a 54 year old male with a past medical history of hypertension hyperlipidemia who presents to the emergency department with headaches and vomiting.  Patient's been having headache for the last 1 week.  Is different than any headache he is ever had in the past.  He then began having nausea and vomiting today.  He decided to come get evaluated.  Denies any weakness in his arms or legs, no vision changes.  No recent fevers, no congestion.  No other acute complaints at this time.    History/Other:    Past Medical History:  Past Medical History:    Allergic rhinitis    Essential hypertension    Hemorrhoids    High cholesterol    Hyperlipidemia     Past Surgical History:   Past Surgical History:   Procedure Laterality Date    Colonoscopy        Family History:   Family History   Problem Relation Age of Onset    Heart Attack Father             Heart Disorder Father      Social History:    reports that he has never smoked. He has never used smokeless tobacco. He reports that he does not drink alcohol and does not use drugs.     Allergies: Allergies[1]    Medications:  Medications Ordered Prior to Encounter[2]    Review of Systems:   A comprehensive review of systems was completed.    Pertinent positives and negatives noted in the HPI.    Objective:   Physical Exam:    BP (!) 145/94   Pulse 95   Temp 98.7 °F (37.1 °C) (Oral)   Resp 18   Ht 5' 11\" (1.803 m)   Wt 180 lb (81.6 kg)   SpO2 100%   BMI 25.10 kg/m²   General: No acute distress, Alert, pleasant   Respiratory: No rhonchi, no wheezes  Cardiovascular: S1, S2. Regular rate and rhythm  Abdomen: Soft, Non-tender,  non-distended, positive bowel sounds  Neuro: No new focal deficits  Extremities: No edema    Results:    Labs:      Labs Last 24 Hours:    Recent Labs   Lab 01/30/25  1409   RBC 5.58   HGB 15.8   HCT 44.8   MCV 80.3   MCH 28.3   MCHC 35.3   RDW 12.3   NEPRELIM 12.19*   WBC 14.6*   .0       Recent Labs   Lab 01/30/25  1409   *   BUN 10   CREATSERUM 0.98   EGFRCR 92   CA 9.7   ALB 4.7   *   K 3.9      CO2 28.0   ALKPHO 86   AST 16   ALT 50*   BILT 0.7   TP 8.1       Estimated Glomerular Filtration Rate: 92.2 mL/min/1.73m2 (by CKD-EPI based on SCr of 0.98 mg/dL).    No results found for: \"PT\", \"INR\"    No results for input(s): \"TROP\", \"TROPHS\", \"CK\" in the last 168 hours.    No results for input(s): \"TROP\", \"PBNP\" in the last 168 hours.    No results for input(s): \"PCT\" in the last 168 hours.    Imaging: Imaging data reviewed in Epic.    Assessment & Plan:      #Brain mass   #Headaches   CT noted 2.6cm hypodense mass  MRI brain shows similar  Differential includes abscess, glioblastoma, lymphoma, mets  Blood cultures sent to r/o abscess - started rocephin and vanc  Keppra 500mg IV BID for seizure proph  Neurosurgery consult   Consult ID    #Essential HTN  Lisinopril    #HLD  Statin     #Mild hyponatremia   Na 134        Plan of care discussed with patient, er physician, nurse     Alex Byers MD    Supplementary Documentation:     The 21st Century Cures Act makes medical notes like these available to patients in the interest of transparency. Please be advised this is a medical document. Medical documents are intended to carry relevant information, facts as evident, and the clinical opinion of the practitioner. The medical note is intended as peer to peer communication and may appear blunt or direct. It is written in medical language and may contain abbreviations or verbiage that are unfamiliar.                                       [1] No Known Allergies  [2]   Current Facility-Administered  Medications on File Prior to Encounter   Medication Dose Route Frequency Provider Last Rate Last Admin    [COMPLETED] ketorolac (Toradol) 30 MG/ML injection 15 mg  15 mg Intramuscular Once Fela PanANTONY   15 mg at 25 0906     Current Outpatient Medications on File Prior to Encounter   Medication Sig Dispense Refill    lisinopril-hydroCHLOROthiazide 10-12.5 MG Oral Tab Take 1 tablet by mouth daily. 90 tablet 3    rosuvastatin 20 MG Oral Tab Take 1 tablet (20 mg total) by mouth daily. 90 tablet 3    Omega-3 Fatty Acids (FISH OIL) 1000 MG Oral Capsule Delayed Release Take 1,000 mg by mouth daily.  0    Multiple Vitamin (MULTI-VITAMIN DAILY) Oral Tab Take by mouth.      Coenzyme Q10 (COQ10) 100 MG Oral Cap Take by mouth.      cyclobenzaprine 10 MG Oral Tab Take 1 tablet (10 mg total) by mouth every 6 (six) hours as needed. (Patient not taking: Reported on 2025)      [] predniSONE 20 MG Oral Tab Take 2 tablets (40 mg total) by mouth daily for 5 days. 10 tablet 0

## 2025-01-30 NOTE — ED INITIAL ASSESSMENT (HPI)
Severe headache since Wednesday, nausea and vomiting this morning.    Was at [facility] last week for TMJ with headache and Rx prednisone, finished Tuesday.

## 2025-01-31 ENCOUNTER — APPOINTMENT (OUTPATIENT)
Dept: GENERAL RADIOLOGY | Facility: HOSPITAL | Age: 55
DRG: 024 | End: 2025-01-31
Attending: PHYSICIAN ASSISTANT
Payer: COMMERCIAL

## 2025-01-31 ENCOUNTER — ANESTHESIA (OUTPATIENT)
Dept: SURGERY | Facility: HOSPITAL | Age: 55
End: 2025-01-31
Payer: COMMERCIAL

## 2025-01-31 ENCOUNTER — APPOINTMENT (OUTPATIENT)
Dept: ULTRASOUND IMAGING | Facility: HOSPITAL | Age: 55
DRG: 024 | End: 2025-01-31
Attending: NEUROLOGICAL SURGERY
Payer: COMMERCIAL

## 2025-01-31 ENCOUNTER — APPOINTMENT (OUTPATIENT)
Dept: CT IMAGING | Facility: HOSPITAL | Age: 55
DRG: 024 | End: 2025-01-31
Attending: PHYSICIAN ASSISTANT
Payer: COMMERCIAL

## 2025-01-31 ENCOUNTER — ANESTHESIA EVENT (OUTPATIENT)
Dept: SURGERY | Facility: HOSPITAL | Age: 55
End: 2025-01-31
Payer: COMMERCIAL

## 2025-01-31 ENCOUNTER — APPOINTMENT (OUTPATIENT)
Dept: CT IMAGING | Facility: HOSPITAL | Age: 55
DRG: 024 | End: 2025-01-31
Attending: NEUROLOGICAL SURGERY
Payer: COMMERCIAL

## 2025-01-31 LAB
ANION GAP SERPL CALC-SCNC: 8 MMOL/L (ref 0–18)
ANTIBODY SCREEN: NEGATIVE
APTT PPP: 28.2 SECONDS (ref 23–36)
BASOPHILS # BLD AUTO: 0.01 X10(3) UL (ref 0–0.2)
BASOPHILS NFR BLD AUTO: 0.1 %
BUN BLD-MCNC: 10 MG/DL (ref 9–23)
CALCIUM BLD-MCNC: 8.9 MG/DL (ref 8.7–10.6)
CHLORIDE SERPL-SCNC: 101 MMOL/L (ref 98–112)
CO2 SERPL-SCNC: 27 MMOL/L (ref 21–32)
CREAT BLD-MCNC: 0.86 MG/DL
EGFRCR SERPLBLD CKD-EPI 2021: 104 ML/MIN/1.73M2 (ref 60–?)
EOSINOPHIL # BLD AUTO: 0.01 X10(3) UL (ref 0–0.7)
EOSINOPHIL NFR BLD AUTO: 0.1 %
ERYTHROCYTE [DISTWIDTH] IN BLOOD BY AUTOMATED COUNT: 12.3 %
GLUCOSE BLD-MCNC: 113 MG/DL (ref 70–99)
HCT VFR BLD AUTO: 41.3 %
HGB BLD-MCNC: 14.5 G/DL
IMM GRANULOCYTES # BLD AUTO: 0.06 X10(3) UL (ref 0–1)
IMM GRANULOCYTES NFR BLD: 0.5 %
INR BLD: 1.1 (ref 0.8–1.2)
LYMPHOCYTES # BLD AUTO: 1.27 X10(3) UL (ref 1–4)
LYMPHOCYTES NFR BLD AUTO: 10.1 %
MAGNESIUM SERPL-MCNC: 2.3 MG/DL (ref 1.6–2.6)
MCH RBC QN AUTO: 28 PG (ref 26–34)
MCHC RBC AUTO-ENTMCNC: 35.1 G/DL (ref 31–37)
MCV RBC AUTO: 79.7 FL
MONOCYTES # BLD AUTO: 1.09 X10(3) UL (ref 0.1–1)
MONOCYTES NFR BLD AUTO: 8.7 %
NEUTROPHILS # BLD AUTO: 10.09 X10 (3) UL (ref 1.5–7.7)
NEUTROPHILS # BLD AUTO: 10.09 X10(3) UL (ref 1.5–7.7)
NEUTROPHILS NFR BLD AUTO: 80.5 %
OSMOLALITY SERPL CALC.SUM OF ELEC: 282 MOSM/KG (ref 275–295)
PLATELET # BLD AUTO: 323 10(3)UL (ref 150–450)
POTASSIUM SERPL-SCNC: 4.1 MMOL/L (ref 3.5–5.1)
PROTHROMBIN TIME: 14.3 SECONDS (ref 11.6–14.8)
RBC # BLD AUTO: 5.18 X10(6)UL
RH BLOOD TYPE: POSITIVE
RH BLOOD TYPE: POSITIVE
SODIUM SERPL-SCNC: 136 MMOL/L (ref 136–145)
WBC # BLD AUTO: 12.5 X10(3) UL (ref 4–11)

## 2025-01-31 PROCEDURE — 00970ZZ DRAINAGE OF CEREBRAL HEMISPHERE, OPEN APPROACH: ICD-10-PCS | Performed by: NEUROLOGICAL SURGERY

## 2025-01-31 PROCEDURE — 74177 CT ABD & PELVIS W/CONTRAST: CPT | Performed by: PHYSICIAN ASSISTANT

## 2025-01-31 PROCEDURE — 76998 US GUIDE INTRAOP: CPT | Performed by: NEUROLOGICAL SURGERY

## 2025-01-31 PROCEDURE — 99223 1ST HOSP IP/OBS HIGH 75: CPT | Performed by: NEUROLOGICAL SURGERY

## 2025-01-31 PROCEDURE — 61781 SCAN PROC CRANIAL INTRA: CPT | Performed by: NEUROLOGICAL SURGERY

## 2025-01-31 PROCEDURE — 99233 SBSQ HOSP IP/OBS HIGH 50: CPT | Performed by: HOSPITALIST

## 2025-01-31 PROCEDURE — 71260 CT THORAX DX C+: CPT | Performed by: PHYSICIAN ASSISTANT

## 2025-01-31 PROCEDURE — 61320 CRNEC/CRNOT DRG ICR ABS STTL: CPT | Performed by: NEUROLOGICAL SURGERY

## 2025-01-31 PROCEDURE — 70450 CT HEAD/BRAIN W/O DYE: CPT | Performed by: NEUROLOGICAL SURGERY

## 2025-01-31 PROCEDURE — 70350 X-RAY HEAD FOR ORTHODONTIA: CPT | Performed by: PHYSICIAN ASSISTANT

## 2025-01-31 RX ORDER — LABETALOL HYDROCHLORIDE 5 MG/ML
INJECTION, SOLUTION INTRAVENOUS AS NEEDED
Status: DISCONTINUED | OUTPATIENT
Start: 2025-01-31 | End: 2025-01-31 | Stop reason: SURG

## 2025-01-31 RX ORDER — METRONIDAZOLE 500 MG/100ML
500 INJECTION, SOLUTION INTRAVENOUS EVERY 8 HOURS
Status: DISCONTINUED | OUTPATIENT
Start: 2025-01-31 | End: 2025-02-02

## 2025-01-31 RX ORDER — HYDROMORPHONE HYDROCHLORIDE 1 MG/ML
0.2 INJECTION, SOLUTION INTRAMUSCULAR; INTRAVENOUS; SUBCUTANEOUS EVERY 5 MIN PRN
Status: DISCONTINUED | OUTPATIENT
Start: 2025-01-31 | End: 2025-02-01 | Stop reason: HOSPADM

## 2025-01-31 RX ORDER — PHENYLEPHRINE HCL 10 MG/ML
VIAL (ML) INJECTION AS NEEDED
Status: DISCONTINUED | OUTPATIENT
Start: 2025-01-31 | End: 2025-01-31 | Stop reason: SURG

## 2025-01-31 RX ORDER — SODIUM CHLORIDE, SODIUM LACTATE, POTASSIUM CHLORIDE, CALCIUM CHLORIDE 600; 310; 30; 20 MG/100ML; MG/100ML; MG/100ML; MG/100ML
INJECTION, SOLUTION INTRAVENOUS CONTINUOUS
Status: DISCONTINUED | OUTPATIENT
Start: 2025-01-31 | End: 2025-02-01 | Stop reason: HOSPADM

## 2025-01-31 RX ORDER — HYDROMORPHONE HYDROCHLORIDE 1 MG/ML
0.6 INJECTION, SOLUTION INTRAMUSCULAR; INTRAVENOUS; SUBCUTANEOUS EVERY 5 MIN PRN
Status: DISCONTINUED | OUTPATIENT
Start: 2025-01-31 | End: 2025-02-01 | Stop reason: HOSPADM

## 2025-01-31 RX ORDER — HYDROMORPHONE HYDROCHLORIDE 1 MG/ML
0.4 INJECTION, SOLUTION INTRAMUSCULAR; INTRAVENOUS; SUBCUTANEOUS EVERY 5 MIN PRN
Status: DISCONTINUED | OUTPATIENT
Start: 2025-01-31 | End: 2025-02-01 | Stop reason: HOSPADM

## 2025-01-31 RX ORDER — HYDROCODONE BITARTRATE AND ACETAMINOPHEN 5; 325 MG/1; MG/1
2 TABLET ORAL EVERY 4 HOURS PRN
Status: DISCONTINUED | OUTPATIENT
Start: 2025-01-31 | End: 2025-02-04

## 2025-01-31 RX ORDER — LIDOCAINE HYDROCHLORIDE 10 MG/ML
INJECTION, SOLUTION EPIDURAL; INFILTRATION; INTRACAUDAL; PERINEURAL AS NEEDED
Status: DISCONTINUED | OUTPATIENT
Start: 2025-01-31 | End: 2025-01-31 | Stop reason: SURG

## 2025-01-31 RX ORDER — SODIUM CHLORIDE 9 MG/ML
INJECTION, SOLUTION INTRAVENOUS CONTINUOUS
Status: DISCONTINUED | OUTPATIENT
Start: 2025-01-31 | End: 2025-02-01

## 2025-01-31 RX ORDER — ROCURONIUM BROMIDE 10 MG/ML
INJECTION, SOLUTION INTRAVENOUS AS NEEDED
Status: DISCONTINUED | OUTPATIENT
Start: 2025-01-31 | End: 2025-01-31 | Stop reason: SURG

## 2025-01-31 RX ORDER — ONDANSETRON 2 MG/ML
4 INJECTION INTRAMUSCULAR; INTRAVENOUS EVERY 6 HOURS PRN
Status: DISCONTINUED | OUTPATIENT
Start: 2025-01-31 | End: 2025-02-01 | Stop reason: HOSPADM

## 2025-01-31 RX ORDER — HYDROCODONE BITARTRATE AND ACETAMINOPHEN 5; 325 MG/1; MG/1
1 TABLET ORAL EVERY 4 HOURS PRN
Status: DISCONTINUED | OUTPATIENT
Start: 2025-01-31 | End: 2025-02-04

## 2025-01-31 RX ORDER — ONDANSETRON 2 MG/ML
INJECTION INTRAMUSCULAR; INTRAVENOUS AS NEEDED
Status: DISCONTINUED | OUTPATIENT
Start: 2025-01-31 | End: 2025-01-31 | Stop reason: SURG

## 2025-01-31 RX ORDER — DEXAMETHASONE SODIUM PHOSPHATE 4 MG/ML
VIAL (ML) INJECTION AS NEEDED
Status: DISCONTINUED | OUTPATIENT
Start: 2025-01-31 | End: 2025-01-31 | Stop reason: SURG

## 2025-01-31 RX ORDER — LIDOCAINE HYDROCHLORIDE AND EPINEPHRINE 10; 10 MG/ML; UG/ML
INJECTION, SOLUTION INFILTRATION; PERINEURAL AS NEEDED
Status: DISCONTINUED | OUTPATIENT
Start: 2025-01-31 | End: 2025-01-31 | Stop reason: HOSPADM

## 2025-01-31 RX ORDER — MORPHINE SULFATE 2 MG/ML
2 INJECTION, SOLUTION INTRAMUSCULAR; INTRAVENOUS
Status: DISCONTINUED | OUTPATIENT
Start: 2025-01-31 | End: 2025-02-04

## 2025-01-31 RX ORDER — NALOXONE HYDROCHLORIDE 0.4 MG/ML
0.08 INJECTION, SOLUTION INTRAMUSCULAR; INTRAVENOUS; SUBCUTANEOUS AS NEEDED
Status: DISCONTINUED | OUTPATIENT
Start: 2025-01-31 | End: 2025-02-01 | Stop reason: HOSPADM

## 2025-01-31 RX ADMIN — PHENYLEPHRINE HCL 50 MCG: 10 MG/ML VIAL (ML) INJECTION at 20:32:00

## 2025-01-31 RX ADMIN — LABETALOL HYDROCHLORIDE 5 MG: 5 INJECTION, SOLUTION INTRAVENOUS at 21:27:00

## 2025-01-31 RX ADMIN — ROCURONIUM BROMIDE 10 MG: 10 INJECTION, SOLUTION INTRAVENOUS at 20:22:00

## 2025-01-31 RX ADMIN — LABETALOL HYDROCHLORIDE 5 MG: 5 INJECTION, SOLUTION INTRAVENOUS at 21:21:00

## 2025-01-31 RX ADMIN — DEXAMETHASONE SODIUM PHOSPHATE 4 MG: 4 MG/ML VIAL (ML) INJECTION at 19:41:00

## 2025-01-31 RX ADMIN — LABETALOL HYDROCHLORIDE 5 MG: 5 INJECTION, SOLUTION INTRAVENOUS at 21:25:00

## 2025-01-31 RX ADMIN — LIDOCAINE HYDROCHLORIDE 50 MG: 10 INJECTION, SOLUTION EPIDURAL; INFILTRATION; INTRACAUDAL; PERINEURAL at 19:34:00

## 2025-01-31 RX ADMIN — ROCURONIUM BROMIDE 70 MG: 10 INJECTION, SOLUTION INTRAVENOUS at 19:35:00

## 2025-01-31 RX ADMIN — ONDANSETRON 4 MG: 2 INJECTION INTRAMUSCULAR; INTRAVENOUS at 21:21:00

## 2025-01-31 RX ADMIN — LABETALOL HYDROCHLORIDE 5 MG: 5 INJECTION, SOLUTION INTRAVENOUS at 21:18:00

## 2025-01-31 NOTE — ED QUICK NOTES
Orders for admission, patient is aware of plan and ready to go upstairs. Any questions, please call ED RN Leila at extension [phone].     Patient Covid vaccination status: Fully vaccinated     COVID Test Ordered in ED: None    COVID Suspicion at Admission: N/A    Running Infusions:  None    Mental Status/LOC at time of transport: aox4    Other pertinent information:   CIWA score: N/A   NIH score:  N/A

## 2025-01-31 NOTE — PHYSICAL THERAPY NOTE
Order received, chart reviewed. Pt going to OR today. Will hold and follow as appropriate.     Loretta Bruner, PT, DPT  01/31/25

## 2025-01-31 NOTE — PLAN OF CARE
Assumed care @0730  VSS,   A&Ox4,   RA    NSR ,   Minimal Pain in the posterior head, refused pain medicine.  Up as tolerated.    NPO.   IVF infusing 0.9@75ml/hr, abx  Plan for OR @ 1540.    Updated POC with patient and family.      Problem: NEUROLOGICAL - ADULT  Goal: Achieves stable or improved neurological status  Description: INTERVENTIONS  - Assess for and report changes in neurological status  - Initiate measures to prevent increased intracranial pressure  - Maintain blood pressure and fluid volume within ordered parameters to optimize cerebral perfusion and minimize risk of hemorrhage  - Monitor temperature, glucose, and sodium. Initiate appropriate interventions as ordered  Outcome: Progressing     Problem: PAIN - ADULT  Goal: Verbalizes/displays adequate comfort level or patient's stated pain goal  Description: INTERVENTIONS:  - Encourage pt to monitor pain and request assistance  - Assess pain using appropriate pain scale  - Administer analgesics based on type and severity of pain and evaluate response  - Implement non-pharmacological measures as appropriate and evaluate response  - Consider cultural and social influences on pain and pain management  - Manage/alleviate anxiety  - Utilize distraction and/or relaxation techniques  - Monitor for opioid side effects  - Notify MD/LIP if interventions unsuccessful or patient reports new pain  - Anticipate increased pain with activity and pre-medicate as appropriate  Outcome: Progressing     Problem: RISK FOR INFECTION - ADULT  Goal: Absence of fever/infection during anticipated neutropenic period  Description: INTERVENTIONS  - Monitor WBC  - Administer growth factors as ordered  - Implement neutropenic guidelines  Outcome: Progressing

## 2025-01-31 NOTE — PLAN OF CARE
Received pt at 1930  Pt AOx4, NSR, RA, VSS  IV abx  D/c Planning: Specialties to see  Call light within reach.  Needs currently met

## 2025-01-31 NOTE — PROGRESS NOTES
[facility]   part of [facility]     Hospitalist Progress Note     Devora Hess Patient Status:  Inpatient    1970 MRN [id number]   Location [facility] 7NE-A Attending Alex Byers MD   Hosp Day # 1 PCP Yesica Schwarz DO     Chief Complaint: Brain mass    Subjective:     Patient doing well, denies headache, focal weakness, afebrile.    Objective:    Review of Systems:   A comprehensive review of systems was completed; pertinent positive and negatives stated in subjective.    Vital signs:  Temp:  [98 °F (36.7 °C)-100.4 °F (38 °C)] 99 °F (37.2 °C)  Pulse:  [] 90  Resp:  [15-22] 22  BP: (126-158)/(74-97) 141/89  SpO2:  [93 %-100 %] 96 %    Physical Exam:    General: No acute distress  Respiratory: No wheezes, no rhonchi  Cardiovascular: S1, S2, regular rate and rhythm  Abdomen: Soft, Non-tender, non-distended, positive bowel sounds  Neuro: No new focal deficits.   Extremities: No edema      Diagnostic Data:    Labs:  Recent Labs   Lab 25  1409 25  0524 25  0843   WBC 14.6* 12.5*  --    HGB 15.8 14.5  --    MCV 80.3 79.7*  --    .0 323.0  --    INR  --   --  1.10       Recent Labs   Lab 25  1409 25  0524   * 113*   BUN 10 10   CREATSERUM 0.98 0.86   CA 9.7 8.9   ALB 4.7  --    * 136   K 3.9 4.1    101   CO2 28.0 27.0   ALKPHO 86  --    AST 16  --    ALT 50*  --    BILT 0.7  --    TP 8.1  --        Estimated Glomerular Filtration Rate: 103.5 mL/min/1.73m2 (by CKD-EPI based on SCr of 0.86 mg/dL).    No results for input(s): \"TROP\", \"TROPHS\", \"CK\" in the last 168 hours.    Recent Labs   Lab 25  0843   PTP 14.3   INR 1.10                  Microbiology    No results found for this visit on 25.      Imaging: Reviewed in Epic.    Medications:    rosuvastatin  20 mg Oral Nightly    lisinopril  10 mg Oral Daily    levETIRAcetam  500 mg Intravenous Q12H    cefTRIAXone  2 g Intravenous Q24H       Assessment & Plan:      #Brain mass    #Headaches   CT noted 2.6cm hypodense mass  MRI brain shows similar  Differential includes abscess, glioblastoma, lymphoma, mets  Blood cultures sent to r/o abscess - started rocephin and vanc  Keppra 500mg IV BID for seizure prophylaxis  Neurosurgery consult, dental xrays negative, CT c/a/p, possible right crani for evacuation of mass, hold AP/AC, post op will need ICU  ID following     #Essential HTN  Lisinopril     #HLD  Statin      #Mild hyponatremia   Na 134         Kimberly Gee MD    Supplementary Documentation:     Quality:  DVT Mechanical Prophylaxis:   SCDs, Early ambuation  DVT Pharmacologic Prophylaxis   Medication   None                Code Status: Not on file  [facility]: No urinary catheter in place  [facility] Duration (in days):   Central line:    [facility]:     Discharge is dependent on: progress  At this point Mr. Hess is expected to be discharge to: tbd    The 21st Century Cures Act makes medical notes like these available to patients in the interest of transparency. Please be advised this is a medical document. Medical documents are intended to carry relevant information, facts as evident, and the clinical opinion of the practitioner. The medical note is intended as peer to peer communication and may appear blunt or direct. It is written in medical language and may contain abbreviations or verbiage that are unfamiliar.

## 2025-01-31 NOTE — CONSULTS
[facility] Neurosurgery Consult    Devora Hess Patient Status:  Inpatient    1970 MRN [id number]   Location [facility] 7NE-A Attending Alex Byers MD   Hosp Day # 1 PCP Yesica Schwarz DO     REASON FOR CONSULTATION:  Right temporal brain mass    HISTORY OF PRESENT ILLNESS:  Devora Hess is a(n) 54 year old male with PMH HTN HLD presenting with right sided jaw pain and headache with associated nausea over the last 2 weeks and vomiting yesterday. Patient was evaluated by his PCP for the jaw pain and it was thought to be secondary to TMJ for which he was prescribed Prednisone. Patient had good relief of his pain with the Prednisone until the dose stopped Tuesday and he had return of severe right jaw pain on Wednesday. He presented to Urgent Care and a CT head was obtained revealing a right temporal mass for which he was recommended to go to ED for further evaluation. Patient denies facial weakness or trouble with speech or chewing. He denies blurred or double vision, no focal weakness on no numbness or tingling in his face or extremities. He has not received any antibiotics prior to his hospitalization.     PAST MEDICAL HISTORY:  Past Medical History:    Allergic rhinitis    Essential hypertension    Hemorrhoids    High cholesterol    Hyperlipidemia       PAST SURGICAL HISTORY:  Past Surgical History:   Procedure Laterality Date    Colonoscopy         FAMILY HISTORY:  family history includes Heart Attack in his father; Heart Disorder in his father.    SOCIAL HISTORY:   reports that he has never smoked. He has never used smokeless tobacco. He reports that he does not drink alcohol and does not use drugs.    ALLERGIES:  Allergies[1]    MEDICATIONS:  Prescriptions Prior to Admission[2]  Current Facility-Administered Medications   Medication Dose Route Frequency    rosuvastatin (Crestor) tab 20 mg  20 mg Oral Nightly    lisinopril (Zestril) tab 10 mg  10 mg Oral Daily    acetaminophen (Tylenol Extra  Strength) tab 500 mg  500 mg Oral Q4H PRN    melatonin tab 3 mg  3 mg Oral Nightly PRN    glycerin-hypromellose- (Artificial Tears) 0.2-0.2-1 % ophthalmic solution 1 drop  1 drop Both Eyes QID PRN    sodium chloride (Saline Mist) 0.65 % nasal solution 1 spray  1 spray Each Nare Q3H PRN    polyethylene glycol (PEG 3350) (Miralax) 17 g oral packet 17 g  17 g Oral Daily PRN    sennosides (Senokot) tab 17.2 mg  17.2 mg Oral Nightly PRN    bisacodyl (Dulcolax) 10 MG rectal suppository 10 mg  10 mg Rectal Daily PRN    fleet enema (Fleet) rectal enema 133 mL  1 enema Rectal Once PRN    ondansetron (Zofran) 4 MG/2ML injection 4 mg  4 mg Intravenous Q6H PRN    prochlorperazine (Compazine) 10 MG/2ML injection 5 mg  5 mg Intravenous Q8H PRN    levETIRAcetam (Keppra) 500 mg/5mL injection 500 mg  500 mg Intravenous Q12H    cefTRIAXone (Rocephin) 2 g in sodium chloride 0.9% 100 mL IVPB-ADDV  2 g Intravenous Q24H       REVIEW OF SYSTEMS:  Comprehensive Review of Systems obtained, and is negative other than that mentioned in the History of Present Illness.      PHYSICAL EXAMINATION:  VITAL SIGNS: /74 (BP Location: Left arm)   Pulse 88   Temp 99.5 °F (37.5 °C) (Temporal)   Resp 15   Ht 5' 11\" (1.803 m)   Wt 177 lb 8 oz (80.5 kg)   SpO2 93%   BMI 24.76 kg/m²   GENERAL:  Patient is a 54 year old male in no acute distress.  HEENT:  Normocephalic, atraumatic    NEUROLOGICAL:  This patient is alert and orientated x 3.  Speech fluent. Comprehension intact.   PERRLA +3 brisk,  EOMI.  VFF.  Face is symmetrical. Tongue is midline.  Uvula and palate elevate symmetrically. Shoulder shrug normal bilaterally. Remaining CN's GI.  Sensation to light touch is intact bilaterally.  Motor: No Drift. No arm or leg weakness noted. 5/5 bilaterally. Finger-to-nose coordination is intact.  Gait deferred.       Upper extremity strength:       Deltoid    Triceps     Biceps        Wrist Extension  Finger extension Thumb Abduction   Thumb adduction Intrinsics   Right         5        5         5          5 5 5 5 5 5   Left         5        5         5          5 5 5 5 5 5     Lower extremity strength:     Iliopsoas  Hamstrings   Quads    D-flexion P-flexion Eversion   Right       5         5       5         5 5 5   Left       5         5       5         5 5 5     Reflexes DTRs:     Biceps   Brachioradialis   Triceps    Patellar    Ankle  Hamstring   Right      2+           2+       2+        2+       2+        2+   Left      2+           2+       2+        2+       2+        2+        DIAGNOSTIC DATA:   Lab Results   Component Value Date    WBC 12.5 01/31/2025    HGB 14.5 01/31/2025    HCT 41.3 01/31/2025    .0 01/31/2025    CREATSERUM 0.86 01/31/2025    BUN 10 01/31/2025     01/31/2025    K 4.1 01/31/2025     01/31/2025    CO2 27.0 01/31/2025     01/31/2025    CA 8.9 01/31/2025    ALB 4.7 01/30/2025    ALKPHO 86 01/30/2025    BILT 0.7 01/30/2025    TP 8.1 01/30/2025    AST 16 01/30/2025    ALT 50 01/30/2025    ESRML 17 01/30/2025    CRP 0.40 01/30/2025    MG 2.3 01/31/2025       IMAGING:  MRI BRAIN (W+WO) (CPT=70553)    Result Date: 1/30/2025  CONCLUSION:  The known lesion of the right temporal lobe demonstrates peripheral enhancement with probable adjacent pachymeningeal enhancement and dural thickening.  There is central restricted diffusion.  Differential considerations would include a cerebral abscess or glioblastoma, with other etiologies such as metastasis, lymphoma and tumefactive demyelinating disease possible but felt to be less likely.  Critical results were discussed with Dr. Farmer by Dr. Correa at approximately 1817 on 1/30/25.  Read back was performed.    LOCATION:  [facility]    Dictated by (CST): Garret Correa MD on 1/30/2025 at 6:08 PM     Finalized by (CST): Garret Correa MD on 1/30/2025 at 6:18 PM       CTA BRAIN (CPT=70496)    Result Date: 1/30/2025  CONCLUSION:  1. There is a 2.6 cm hypodense mass within  the anterior inferior right temporal lobe.  There is surrounding hypoattenuation within the anterior right temporal lobe which may represent edema.  This mass may represent abscess, primary glial neoplasm, or metastatic disease.  A contrast-enhanced brain MRI is recommended for further assessment. 2. No high-grade stenosis, occlusion, or aneurysm is identified within the major intracranial arterial vasculature.    Critical results were discussed with Dr. Farmer at 1633 hours on 1/30/2025. Critical results were read back.   LOCATION:  [id number]   Dictated by (CST): Stromberg, LeRoy, MD on 1/30/2025 at 4:16 PM     Finalized by (CST): Stromberg, LeRoy, MD on 1/30/2025 at 4:37 PM         ASSESSMENT:  Patient Active Problem List   Diagnosis    Personal history of colonic polyps    Benign neoplasm of cecum    Diverticulosis of colon    Internal hemorrhoids    Brain mass     Devora Hess is a(n) 54 year old male with PMH HTN HLD presenting with right sided jaw pain and headache with associated nausea over the last 2 weeks and vomiting yesterday found to have right temporal ring-enhancing brain mass concerning for abscess. Patient with elevated WBC on arrival. He was started on empiric Vanc/Ceftriaxone and cultures were sent.       Plan:  -Obtain dental xrays to evaluate for right dental infection  -CT chest/abd/pelvis to rule out metastatic disease  -Keep NPO/IVF now for potential OR today for right crani for evacuation of mass. Will send coags and type and screen now  -Appreciate ID for abx guidance  -Hold antiplatelets/anticoagulants  -Keppra for seizure ppx  -Patient will need to be admitted to ICU for q1h neurochecks post-op  -Further recs after above obtained and reviewed    Patient seen with Dr Blaze Mendoza PA-C  [facility]  1/31/2025 7:29 AM      Is this a shared or split note between Advanced Practice Provider and Physician? Yes          [1] No Known Allergies  [2]    Medications Prior to Admission   Medication Sig Dispense Refill Last Dose/Taking    lisinopril-hydroCHLOROthiazide 10-12.5 MG Oral Tab Take 1 tablet by mouth daily. 90 tablet 3 2025 at  9:00 AM    rosuvastatin 20 MG Oral Tab Take 1 tablet (20 mg total) by mouth daily. 90 tablet 3 2025 at  9:00 PM    Omega-3 Fatty Acids (FISH OIL) 1000 MG Oral Capsule Delayed Release Take 1,000 mg by mouth daily.  0 2025 at  9:00 AM    Multiple Vitamin (MULTI-VITAMIN DAILY) Oral Tab Take by mouth.   2025 at  9:00 AM    Coenzyme Q10 (COQ10) 100 MG Oral Cap Take by mouth.   2025 at  9:00 AM    cyclobenzaprine 10 MG Oral Tab Take 1 tablet (10 mg total) by mouth every 6 (six) hours as needed. (Patient not taking: Reported on 2025)   Unknown    [] predniSONE 20 MG Oral Tab Take 2 tablets (40 mg total) by mouth daily for 5 days. 10 tablet 0

## 2025-01-31 NOTE — OCCUPATIONAL THERAPY NOTE
OT order received, chart reviewed. Pt going to OR today, will follow up as able and appropriate.

## 2025-01-31 NOTE — CONSULTS
INFECTIOUS DISEASE CONSULTATION    Devora Hess Patient Status:  Inpatient    1970 MRN [id number]   [facility] 7NE-A Attending Alex Byers MD   Hosp Day # 1 PCP Yesica Schwarz DO       Requested by Dr. Byers    Reason for Consultation:  Possible brain abscess    History of Present Illness:  Devora Hess is a a(n) 54 year old male emigrated from [facility] in .   He has had a worsening headache over the last 2 weeks.  He saw a dentist and underwent xrays as well as a CT scan that he says did not show anything significant that would require dental procedures.  He was told it may be TMJ.  He  went to immediate care on  and given a course of steroids.  He returned on  with ongoing symptoms and found to have abnormal CT showing hypodense mass at the temporal lobe. This was followed by MRI that demonstrated temporal lobe mass with peripheral enhancement and dural thickening.  No fever reported.     Neurosurgery is planning craniotomy today.        History:  Past Medical History:    Allergic rhinitis    Essential hypertension    Hemorrhoids    High cholesterol    Hyperlipidemia     Past Surgical History:   Procedure Laterality Date    Colonoscopy       Family History   Problem Relation Age of Onset    Heart Attack Father             Heart Disorder Father       reports that he has never smoked. He has never used smokeless tobacco. He reports that he does not drink alcohol and does not use drugs.      Allergies:  Allergies[1]    Medications:    Current Facility-Administered Medications:     rosuvastatin (Crestor) tab 20 mg, 20 mg, Oral, Nightly    lisinopril (Zestril) tab 10 mg, 10 mg, Oral, Daily    acetaminophen (Tylenol Extra Strength) tab 500 mg, 500 mg, Oral, Q4H PRN    melatonin tab 3 mg, 3 mg, Oral, Nightly PRN    glycerin-hypromellose- (Artificial Tears) 0.2-0.2-1 % ophthalmic solution 1 drop, 1 drop, Both Eyes, QID PRN     sodium chloride (Saline Mist) 0.65 % nasal solution 1 spray, 1 spray, Each Nare, Q3H PRN    polyethylene glycol (PEG 3350) (Miralax) 17 g oral packet 17 g, 17 g, Oral, Daily PRN    sennosides (Senokot) tab 17.2 mg, 17.2 mg, Oral, Nightly PRN    bisacodyl (Dulcolax) 10 MG rectal suppository 10 mg, 10 mg, Rectal, Daily PRN    fleet enema (Fleet) rectal enema 133 mL, 1 enema, Rectal, Once PRN    ondansetron (Zofran) 4 MG/2ML injection 4 mg, 4 mg, Intravenous, Q6H PRN    prochlorperazine (Compazine) 10 MG/2ML injection 5 mg, 5 mg, Intravenous, Q8H PRN    levETIRAcetam (Keppra) 500 mg/5mL injection 500 mg, 500 mg, Intravenous, Q12H    cefTRIAXone (Rocephin) 2 g in sodium chloride 0.9% 100 mL IVPB-ADDV, 2 g, Intravenous, Q24H  Medications Ordered Prior to Encounter[2]    Review of Systems:    A comprehensive 10 point review of systems was completed.  Pertinent positives and negatives noted in the the HPI.      Physical Exam:    General: No acute distress. Alert and oriented x 3.  Vital signs: Temp:  [98 °F (36.7 °C)-100.4 °F (38 °C)] 99.5 °F (37.5 °C)  Pulse:  [] 88  Resp:  [15-20] 15  BP: (126-158)/(74-97) 135/74  SpO2:  [93 %-100 %] 93 %  Body mass index is 24.76 kg/m².  HEENT: Moist mucous membranes. Extraocular muscles are intact.  Neck: No swelling, no masses  Respiratory: Non labored, symmetric exursion  Cardiovascular: No irregularities in rhythm  Abdomen: Soft, nontender, nondistended.    Musculoskeletal: Full range of motion of all extremities.  No swelling noted.  Joints: no effusions  Skin: No lesions. No erythema, no open wounds    Laboratory Data:  Laboratory data reviewed      Recent Labs   Lab 01/31/25  0524   RBC 5.18   HGB 14.5   HCT 41.3   MCV 79.7*   MCH 28.0   MCHC 35.1   RDW 12.3   NEPRELIM 10.09*   WBC 12.5*   .0       Recent Labs   Lab 01/30/25  1409 01/31/25  0524   * 113*   BUN 10 10   CREATSERUM 0.98 0.86   CA 9.7 8.9   ALB 4.7  --    * 136   K 3.9 4.1    101   CO2  28.0 27.0   ALKPHO 86  --    AST 16  --    ALT 50*  --    BILT 0.7  --    TP 8.1  --          Lab Results   Component Value Date    ESRML 17 01/30/2025    CRP 0.40 01/30/2025    MG 2.3 01/31/2025       Pro-Calcitonin  Recent Labs   Lab 01/30/25  1409   PCT 0.06*       Cardiac  No results for input(s): \"TROP\", \"PBNP\" in the last 168 hours.    Creatinine Kinase  No results for input(s): \"CK\" in the last 168 hours.    Inflammatory Markers  Recent Labs   Lab 01/30/25  1409   CRP 0.40       Established Problem list:  Patient Active Problem List   Diagnosis    Personal history of colonic polyps    Benign neoplasm of cecum    Diverticulosis of colon    Internal hemorrhoids    Brain mass       ASSESSMENT/PLAN:  1. Temporal lobe brain mass with peripheral enhancement and adjacent dural thickening  -presented with worsening headache x 2 weeks  No fever or systemic signs of infection  PCT is negative, normal WBC no left shift    -Neurosurgery planning craniotomy    Differential diagnosis includes malignant and benign tumors as well as infection    -can continue IV ceftriaxone and metronidazole until abscess ruled out  -risk for TB infection, emigrated from [place] 34 years ago  --------can maintain airborne isolation during surgery   --------does not need isolation otherwise since no evidence of pulmonary TB    Further recs depending on operative findings as well as pathology, and micro           Phan Huff MD, MD [facility]  [phone]         [1] No Known Allergies  [2]   Current Facility-Administered Medications on File Prior to Encounter   Medication Dose Route Frequency Provider Last Rate Last Admin    [COMPLETED] ketorolac (Toradol) 30 MG/ML injection 15 mg  15 mg Intramuscular Once Fela Pan June, APRERENDIRA   15 mg at 01/24/25 0933     Current Outpatient Medications on File Prior to Encounter   Medication Sig Dispense Refill    lisinopril-hydroCHLOROthiazide 10-12.5 MG Oral Tab Take  1 tablet by mouth daily. 90 tablet 3    rosuvastatin 20 MG Oral Tab Take 1 tablet (20 mg total) by mouth daily. 90 tablet 3    Omega-3 Fatty Acids (FISH OIL) 1000 MG Oral Capsule Delayed Release Take 1,000 mg by mouth daily.  0    Multiple Vitamin (MULTI-VITAMIN DAILY) Oral Tab Take by mouth.      Coenzyme Q10 (COQ10) 100 MG Oral Cap Take by mouth.      cyclobenzaprine 10 MG Oral Tab Take 1 tablet (10 mg total) by mouth every 6 (six) hours as needed. (Patient not taking: Reported on 2025)      [] predniSONE 20 MG Oral Tab Take 2 tablets (40 mg total) by mouth daily for 5 days. 10 tablet 0

## 2025-02-01 LAB
ANION GAP SERPL CALC-SCNC: 10 MMOL/L (ref 0–18)
BUN BLD-MCNC: 10 MG/DL (ref 9–23)
CALCIUM BLD-MCNC: 8.1 MG/DL (ref 8.7–10.6)
CHLORIDE SERPL-SCNC: 101 MMOL/L (ref 98–112)
CO2 SERPL-SCNC: 25 MMOL/L (ref 21–32)
CREAT BLD-MCNC: 0.73 MG/DL
EGFRCR SERPLBLD CKD-EPI 2021: 109 ML/MIN/1.73M2 (ref 60–?)
ERYTHROCYTE [DISTWIDTH] IN BLOOD BY AUTOMATED COUNT: 12.1 %
GLUCOSE BLD-MCNC: 136 MG/DL (ref 70–99)
HCT VFR BLD AUTO: 37.7 %
HGB BLD-MCNC: 13.3 G/DL
MCH RBC QN AUTO: 27.8 PG (ref 26–34)
MCHC RBC AUTO-ENTMCNC: 35.3 G/DL (ref 31–37)
MCV RBC AUTO: 78.9 FL
MRSA DNA SPEC QL NAA+PROBE: NEGATIVE
OSMOLALITY SERPL CALC.SUM OF ELEC: 283 MOSM/KG (ref 275–295)
PLATELET # BLD AUTO: 266 10(3)UL (ref 150–450)
POTASSIUM SERPL-SCNC: 3.7 MMOL/L (ref 3.5–5.1)
RBC # BLD AUTO: 4.78 X10(6)UL
SODIUM SERPL-SCNC: 136 MMOL/L (ref 136–145)
WBC # BLD AUTO: 13 X10(3) UL (ref 4–11)

## 2025-02-01 PROCEDURE — 99291 CRITICAL CARE FIRST HOUR: CPT | Performed by: INTERNAL MEDICINE

## 2025-02-01 PROCEDURE — 99233 SBSQ HOSP IP/OBS HIGH 50: CPT | Performed by: HOSPITALIST

## 2025-02-01 RX ORDER — HYDRALAZINE HYDROCHLORIDE 20 MG/ML
10 INJECTION INTRAMUSCULAR; INTRAVENOUS EVERY 4 HOURS PRN
Status: DISCONTINUED | OUTPATIENT
Start: 2025-02-01 | End: 2025-02-04

## 2025-02-01 RX ORDER — LABETALOL HYDROCHLORIDE 5 MG/ML
10 INJECTION, SOLUTION INTRAVENOUS EVERY 4 HOURS PRN
Status: DISCONTINUED | OUTPATIENT
Start: 2025-02-01 | End: 2025-02-04

## 2025-02-01 NOTE — ANESTHESIA PROCEDURE NOTES
Arterial Line    Date/Time: 1/31/2025 7:49 PM    Performed by: Chava Jay MD  Authorized by: Chava Jay MD    General Information and Staff    Procedure Start:  1/31/2025 7:49 PM  Procedure End:  1/31/2025 7:49 PM  Anesthesiologist:  Chava Jay MD  Performed By:  Anesthesiologist  Patient Location:  OR  Indication: continuous blood pressure monitoring and blood sampling needed    Site Identification: real time ultrasound guided and image stored and retrievable    Preanesthetic Checklist: 2 patient identifiers, IV checked, risks and benefits discussed, monitors and equipment checked, pre-op evaluation, timeout performed, anesthesia consent and sterile technique used    Procedure Details    Catheter Size:  20 G  Catheter Length:  1 and 3/4 inch  Catheter Type:  Arrow  Seldinger Technique?: Yes    Laterality:  Right  Site:  Radial artery  Site Prep: chlorhexidine    Line Secured:  Tape and Tegaderm    Assessment    Events: patient tolerated procedure well with no complications      Medications  1/31/2025 7:49 PM      Additional Comments

## 2025-02-01 NOTE — OCCUPATIONAL THERAPY NOTE
OCCUPATIONAL THERAPY EVALUATION - INPATIENT    Room Number: 6620/6620-A  Evaluation Date: 2/1/2025     Type of Evaluation: Initial  Presenting Problem: s/p R crani for mass resection 1/31    Physician Order: IP Consult to Occupational Therapy  Reason for Therapy:  ADL/IADL Dysfunction and Discharge Planning      OCCUPATIONAL THERAPY ASSESSMENT   Patient met all OT goals at supervision-mod (I) level.    Patient reports no further questions/concerns at this time.   Discharge OT in hospital.          History: Patient is a 54 year old male admitted on 1/30/2025 with Presenting Problem: s/p R crani for mass resection 1/31. Co-Morbidities : HTN    WEIGHT BEARING RESTRICTION                   Recommendations for nursing staff:   Transfers: supervision  Toileting location: Toilet    EVALUATION SESSION:  ACTIVITY TOLERANCE   BP within parameters    COGNITION  Following Commands:  follows multistep commands without difficulty    UPPER EXTREMITY:   ROM: within functional limits   Strength: is within functional limits     EDUCATION PROVIDED  Patient Education : Role of Occupational Therapy; Plan of Care; Functional Transfer Techniques  Patient's Response to Education: Verbalized Understanding    PATIENT START OF SESSION: semi supine  FUNCTIONAL TRANSFER ASSESSMENT  Sit to Stand: Edge of Bed  Edge of Bed: Independent    BED MOBILITY  Supine to Sit : Independent    BALANCE ASSESSMENT  Static Sitting: Independent  Static Standing: Supervision    FUNCTIONAL ADL ASSESSMENT  Grooming Standing: Supervision  LB Dressing Seated: Independent  Toileting Standing: Supervision      EQUIPMENT USED: none  Demonstrates functional use    THERAPIST COMMENTS: ADL/mobility as noted. Fixes socks EOB independently. UE/vision screening with no deficits noted. Ambulates in ott supervision without walker.     Patient End of Session: In bed;Needs met;Call light within reach;RN aware of session/findings;All patient questions and concerns  addressed;Hospital anti-slip socks    OCCUPATIONAL PROFILE    HOME SITUATION  Type of Home: House  Home Layout: Two level  Lives With: Spouse    Toilet and Equipment: Comfort height toilet  Shower/Tub and Equipment: Walk-in shower       Occupation/Status: works from home  Hand Dominance: Right  Drives: Yes  Patient Regularly Uses: Reading glasses    Prior Level of Function: Pt reports independence with ADL and ambulation without AD prior to admit. Pt and spouse both work from home.    SUBJECTIVE  Pt states the only thing that feels different is his headache.    PAIN ASSESSMENT  Rating: 3  Location: headache  Management Techniques: Relaxation    OBJECTIVE  Precautions: Limb alert - right;Seizure (-160)  Fall Risk: Standard fall risk    WEIGHT BEARING RESTRICTION                   AM-PAC ‘6-Clicks’ Inpatient Daily Activity Short Form  -   Putting on and taking off regular lower body clothing?: None  -   Bathing (including washing, rinsing, drying)?: None  -   Toileting, which includes using toilet, bedpan or urinal? : None  -   Putting on and taking off regular upper body clothing?: None  -   Taking care of personal grooming such as brushing teeth?: None  -   Eating meals?: None    AM-PAC Score:  Score: 24  Approx Degree of Impairment: 0%  Standardized Score (AM-PAC Scale): 57.54      ADDITIONAL TESTS     NEUROLOGICAL FINDINGS  Coordination - Rapid Alternating Movement: Symmetrical  Coordination - Finger Opposition: Symmetrical       PLAN   Patient has been evaluated and presents with no skilled Occupational Therapy needs at this time.  Patient discharged from Occupational Therapy services.  Please re-order if a new functional limitation presents during this admission.      Patient Evaluation Complexity Level:   Occupational Profile/Medical History LOW   Specific performance deficits impacting engagement in ADL/IADL LOW   Client Assessment/Performance Deficits LOW   Clinical Decision Making LOW   Overall  Complexity LOW     OT Session Time: 25 minutes  Self-Care Home Management: 15 minutes  Therapeutic Activity:  minutes  Neuromuscular Re-education:  minutes  Therapeutic Exercise:  minutes

## 2025-02-01 NOTE — PLAN OF CARE
Assumed care of pt after shift report. Alert and oriented x4. Q4 neuro checks, neuros intact. Cardene gtt remains off, SBP remains between 100-160. A-line removed. Surgical dressing removed, incision clean/dry w/ scant bloody drainage. MRI brain pending, screening completed. Mild R head incision pain, more pronounced when chewing. SLP, PT/OT evals. Transfer orders. Pt's wife at bedside, pt and wife updated and agreeable to plan of care. See MAR and flowsheets for additional information.

## 2025-02-01 NOTE — PROGRESS NOTES
[facility]   NEUROCRITICAL CARE   PRELIMINARY NOTE    Admission date: 1/30/2025  Reason for Consult: Post op   Chief Complaint:   Chief Complaint   Patient presents with    Headache   ________________________________________________________________    History     History of Presenting Illness  54 year old male with history of HTN and HLD who presented originally to the ED on 1/30 with reports of headache x2 week and n/v starting the day of admission. 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 Patient previously went to urgent care where CTH revealed a right temporal mass which is concerning for abscess, thus he was advised to seek ED evaluation. Patient arrives to CNICU for post operative monitoring after right craniotomy for lesion resection. Patient in stable condition. Of note, patient on airborne isolation due to risk for TB infection.       Past Medical History:    Allergic rhinitis    Essential hypertension    Hemorrhoids    High cholesterol    Hyperlipidemia     Past Surgical History:   Procedure Laterality Date    Colonoscopy       Social History     Socioeconomic History    Marital status:    Tobacco Use    Smoking status: Never    Smokeless tobacco: Never   Vaping Use    Vaping status: Never Used   Substance and Sexual Activity    Alcohol use: Never    Drug use: Never   Other Topics Concern    Caffeine Concern No    Exercise Yes    Seat Belt Yes    Special Diet No    Stress Concern No    Weight Concern No     Social Drivers of Health     Food Insecurity: No Food Insecurity (1/30/2025)    Food Insecurity     Food Insecurity: Never true   Transportation Needs: No Transportation Needs (1/30/2025)    Transportation Needs     Lack of Transportation: No   Physical Activity: Unknown (7/31/2020)    Received from  [facility], [facility]    Exercise Vital Sign     Days of Exercise per Week: 0 days     Minutes of Exercise per Session: Not asked   Housing Stability: Low Risk  (1/30/2025)    Housing Stability     Housing Instability: No     Family History   Problem Relation Age of Onset    Heart Attack Father         2005    Heart Disorder Father      Allergies Allergies[1]    Home Meds  Current Outpatient Medications   Medication Instructions    Coenzyme Q10 (COQ10) 100 MG Oral Cap Take by mouth.    cyclobenzaprine (FLEXERIL) 10 mg, Every 6 hours PRN    Fish Oil 1,000 mg, Daily    lisinopril-hydroCHLOROthiazide 10-12.5 MG Oral Tab 1 tablet, Oral, Daily    Multiple Vitamin (MULTI-VITAMIN DAILY) Oral Tab Take by mouth.    rosuvastatin (CRESTOR) 20 mg, Oral, Daily     Scheduled Meds:   cefTRIAXone  2 g Intravenous Q12H    metroNIDAZOLE  500 mg Intravenous Q8H    [Transfer Hold] rosuvastatin  20 mg Oral Nightly    [Transfer Hold] lisinopril  10 mg Oral Daily    [Transfer Hold] levETIRAcetam  500 mg Intravenous Q12H     Continuous Infusions:   sodium chloride 75 mL/hr at 01/31/25 1929    lactated ringers      niCARdipine       PRN Meds:  lactated ringers    atropine    naloxone    fentaNYL **OR** fentaNYL    HYDROmorphone **OR** HYDROmorphone **OR** HYDROmorphone    ondansetron    niCARdipine    HYDROcodone-acetaminophen **OR** HYDROcodone-acetaminophen    morphINE    [Transfer Hold] acetaminophen    [Transfer Hold] melatonin    [Transfer Hold] glycerin-hypromellose-    [Transfer Hold] sodium chloride    [Transfer Hold] polyethylene glycol (PEG 3350)    [Transfer Hold] sennosides    [Transfer Hold] bisacodyl    [Transfer Hold] fleet enema    [Transfer Hold] ondansetron    [Transfer Hold] prochlorperazine    Review of Systems: Unable to obtain given patients neurologic condition.   Constitutional:    Denies unusual weight loss or weight gain, fever/chills or night sweats.  HEENT:                Denies  changes in vision or difficulty swallowing.  Pulm:                    Denies dyspnea, cough, or sputum production  Cardiac:               Denies chest pain, palpitations or lower extremity edema.  GI:                         Denies constipation, heartburn or melena.  :                       Denies dysuria or hematuria.  Skin:                     Denies rashes or open areas.  Neuro:                  As per HPI    All other systems were reviewed and are negative.    OBJECTIVE   VITAL SIGNS:   Temp:  [96.7 °F (35.9 °C)-99.5 °F (37.5 °C)] 96.7 °F (35.9 °C)  Pulse:  [73-90] 74  Resp:  [13-22] 18  BP: (109-145)/(68-89) 110/68  SpO2:  [93 %-100 %] 98 %      INTAKE/OUTPUT:    Intake/Output Summary (Last 24 hours) at 1/31/2025 2228  Last data filed at 1/31/2025 2120  Gross per 24 hour   Intake 100 ml   Output 5 ml   Net 95 ml       PHYSICAL EXAM:  GENERAL APPEARANCE: Patient resting comfortably in bed, NAD  HEENT: Normocephalic, atraumatic.   LUNGS: Normal respiratory rate and effort   HEART: Regular rate and rhythm   ABDOMEN: Soft. No tenderness, guarding, or rebound.   EXTREMITIES: No cyanosis, clubbing, or edema.  SKIN: Warm, dry, and well perfused.  PSYCH: Mood stable, affect normal      NEUROLOGIC:    Mental Status:  A&O x 4, Follows simple commands, no obvious aphasia or dysarthria  Cranial nerves: PERRL.  Visual fields full.  EOMI.  Face symmetric with normal movement bilaterally.  Hearing grossly intact. Tongue midline with normal movements.   Motor: Drift:  Absent; Motor exam is 5 out of 5 in all extremities bilaterally  Sensation: Intact to light touch bilaterally  Cerebellar: Normal Finger-To-Nose test      LABORATORY DATA:  Last 24 hour labs were reviewed in detail.  Recent Labs   Lab 01/30/25  1409 01/31/25  0524   * 136   K 3.9 4.1    101   CO2 28.0 27.0   * 113*   BUN 10 10   CREATSERUM 0.98 0.86     Recent Labs   Lab 01/30/25  1409 01/31/25  0524   WBC 14.6* 12.5*   HGB 15.8 14.5   PLT  361.0 323.0     Recent Labs   Lab 01/30/25  1409   ALT 50*   AST 16     Recent Labs   Lab 01/31/25  0524   MG 2.3       Radiology:    US INTRAOPERATIVE GUIDANCE (CPT=76998)    Result Date: 1/31/2025  CONCLUSION:  Intraoperative sonographic guidance for debridement of a brain abscess.  Please refer to the operative note for further details.   LOCATION:  [facility]    Dictated by (CST): Chong Nj MD on 1/31/2025 at 9:37 PM     Finalized by (CST): Chong Nj MD on 1/31/2025 at 9:38 PM       CT CHEST+ABDOMEN+PELVIS(ALL CNTRST ONLY)(CPT=71260/11671)    Result Date: 1/31/2025  CONCLUSION:  1. There is no specific evidence of metastatic disease in the chest, abdomen or pelvis. 2. 4 mm right middle lobe lung nodule is of uncertain significance.  This could be followed with serial examinations at an appropriate interval based on patient's known clinical history. 3. Other incidental findings are given above.     LOCATION:  [facility]    Dictated by (CST): Cade Castaneda MD on 1/31/2025 at 11:55 AM     Finalized by (CST): Cade Castaneda MD on 1/31/2025 at 12:00 PM       XR CEPHALOMETRIC XRAY  (CPT=70350)    Result Date: 1/31/2025  PROCEDURE:  XR CEPHALOMETRIC XRAY  (CPT=70350)  TECHNIQUE:    Head pain and right-sided dental pain.  Recent diagnosis intracranial mass involving the right temporal region.  COMPARISON:[facility], MR, MRI BRAIN (W+WO) (CPT=70553), 1/30/2025, 5:14 PM.  INDICATIONS:  Right dental pain in the setting of brain mass  PATIENT STATED HISTORY: (As transcribed by Technologist)  Patient offered no additional information at this time.    FINDINGS:    No focal bony calvarial lesions are identified.  Paranasal sinuses are well pneumatized.  IMPRESSION:  Negative exam.   LOCATION:  [facility]     Dictated by (CST): Hemant Castaneda DO on 1/31/2025 at 9:16 AM     Finalized by (CST): Hemant Castaneda DO on 1/31/2025 at 9:18 AM      ASSESSMENT/PLAN     54 year old male with history of HTN and HLD who presented originally to the  ED on 1/30 with reports of headache x2 week and n/v starting the day of admission. Patient previously went to urgent care where CTH revealed a right temporal mass which is concerning for abscess, thus he was advised to seek ED evaluation. Patient arrives to CNICU for post operative monitoring after right craniotomy for lesion resection.     Neurological:  Right temporal mass concerning for abscess  S/p Right craniotomy for lesion resection   - NSGY on consult   - CTH post op stable   - Q1H neuro checks   - Prophylactic Keppra 500 mg BID    - -160   - PT/OT    Cardiovascular:  HTN    - SBP goal as noted above    - Hold home medications   - IV labetalol/hydralazine pushes and Nicardipine gtt prn     HLD - Continue home med    Pulmonary:        - Satting well on RA    Renal:         - Monitor I&Os          - IVF    - Daily BMP    Gastrointestinal:        - General diet        - Bowel regimen: ordered prn    Infectious Disease:   Right temporal mass, possible abscess    - afebrile, continue to monitor    - ID on consult, currently on isolation for possible TB, infectious work up underway   - Prophylactic antibiotics per ID    Heme/Onc:        - Hb goal > 7, daily CBC    Checklist   - Lines: PIVs   - DVT Prophylaxis: SCDs, no chemical DVT prophylaxis until okay per NSGY   - Diet: Regular   - IVF: NS   - Electrolytes: Replete per protocol   - Code Status: FULL    Dispo: CNICU    Discussed plan of care with on call neurointensivist, Dr. Kelsey    Greater than 50 minutes of critical care time (exclusive of billable procedures) was administered to manage and/or prevent neurologic instability. This involved direct patient intervention, complex decision making, and/or extensive discussions with the patient, family, and clinical staff.    ANTONY Blair  Neurocritical Care  [facility]  Spectra [id number]          [1] No Known Allergies

## 2025-02-01 NOTE — ANESTHESIA POSTPROCEDURE EVALUATION
[facility] Patient Status:  Inpatient   Age/Gender 54 year old male MRN [id number]   Location [facility] POST ANESTHESIA CARE UNIT Attending Alex Byers MD   Hosp Day # 1 PCP Yesica Schwarz DO       Anesthesia Post-op Note    RIGHT CRANIOTOMY FOR LESION RESECTION WITH ULTRASOUND    Procedure Summary       Date: 01/31/25 Room / Location:  MAIN OR 15 / [facility] MAIN OR    Anesthesia Start: 1929 Anesthesia Stop: 2151    Procedure: RIGHT CRANIOTOMY FOR LESION RESECTION WITH ULTRASOUND (Right: Head) Diagnosis:       Brain lesion      (Brain lesion [G93.9])    Surgeons: Indra Thakur MD Anesthesiologist: Chava Jay MD    Anesthesia Type: general ASA Status: 3            Anesthesia Type: general    Vitals Value Taken Time   /77 01/31/25 2150   Temp 97 01/31/25 2151   Pulse 81 01/31/25 2150   Resp 18 01/31/25 2150   SpO2 98 % 01/31/25 2150           Patient Location: PACU    Anesthesia Type: general    Airway Patency: patent    Postop Pain Control: adequate    Mental Status: mildly sedated but able to meaningfully participate in the post-anesthesia evaluation    Nausea/Vomiting: none    Cardiopulmonary/Hydration status: stable euvolemic    Complications: no apparent anesthesia related complications    Postop vital signs: stable    Dental Exam: Unchanged from Preop    Patient to be transferred to ICU.

## 2025-02-01 NOTE — PROGRESS NOTES
INFECTIOUS DISEASE PROGRESS NOTE    Devora Hess Patient Status:  Inpatient    1970 MRN [id number]   [facility] 6NE-A Attending Alex Byers MD   Hosp Day # 2 PCP Yesica Schwarz, DO     SUBJECTIVE  Sleeping comfortably. Afebrile. Went to OR yesterday.     ASSESSMENT/PLAN:    # brain abscess now s/p craniotomy with OR findings for yellow purulence on   Cx with GPC in pairs and clusters - likely strep sp  Fungal and AFB cx in process  NSGY following    Plan:  -  continue IV Ceftriaxone 2g q12hr + flagyl for now.  - will follow OR cx results.   - follow wbc, fever curve  - chart, micro, labs and imaging reviewed.     Will follow    History of Present Illness:  Devora Hess is a a(n) 54 year old male emigrated from [place] in .   He has had a worsening headache over the last 2 weeks.  He saw a dentist and underwent xrays as well as a CT scan that he says did not show anything significant that would require dental procedures.  He was told it may be TMJ.  He  went to immediate care on  and given a course of steroids.  He returned on  with ongoing symptoms and found to have abnormal CT showing hypodense mass at the temporal lobe. This was followed by MRI that demonstrated temporal lobe mass with peripheral enhancement and dural thickening.  No fever reported.     OBJECTIVE  /83   Pulse 82   Temp 98.3 °F (36.8 °C) (Temporal)   Resp 19   Ht 5' 11\" (1.803 m)   Wt 177 lb 8 oz (80.5 kg)   SpO2 95%   BMI 24.76 kg/m²     General: No acute distress. Sleeping  HEENT: EOMI, craniotomy sutures in place  Abdomen: Soft, nontender, nondistended.   Musculoskeletal: No edema  Integument: No rashes    Labs:     Recent Labs   Lab 25  1409 25  0524 25  0420   WBC 14.6* 12.5* 13.0*   HGB 15.8 14.5 13.3   .0 323.0 266.0       Recent Labs   Lab 25  1409 25  0524 25  0420   * 136 136   K 3.9 4.1 3.7    101 101   CO2 28.0 27.0 25.0    BUN 10 10 10   CREATSERUM 0.98 0.86 0.73       Recent Labs   Lab 01/30/25  1409   ALT 50*   AST 16       Microbiology: Reviewed    Imaging: Reviewed       Chino Johnson MD  [facility]  [phone]

## 2025-02-01 NOTE — OPERATIVE REPORT
MRN:  [id number]  Patient Name: Devora Hess  YOB: 1970    DATE OF OPERATION: 1/31/2025    PREOPERATIVE DIAGNOSIS:  Right anterior temporal lesion    POSTOPERATIVE DIAGNOSES:  Right anterior temporal abscess    PROCEDURE PERFORMED:  1. Right sided craniotomy for evacuation of right anterior temporal abscess  2. Neuronavigation  3. Intraoperative ultrasound    SURGEON:  Indra Thakur MD    ASSISTANTS:  None    ANESTHESIA:  General endotracheal.    ESTIMATED BLOOD LOSS:  5 mL.    INDICATIONS FOR PROCEDURE:  Please see the hospital notes for further information.  Briefly, this patient presented with 2 weeks of right-sided headache.  MRI brain demonstrates a 2.5 cm right anterior temporal rim-enhancing lesion concerning for abscess.  Following a discussion of the risks, benefits, alternatives, goals, and expectations of a right sided craniotomy for lesion resection, the patient and his wife expressed understanding and agreement with proceeding as recommended.    DESCRIPTION OF PROCEDURE:  The patient was brought to the operating room and a timeout was performed per protocol.  General endotracheal anesthesia was induced.  The patient was placed in a supine position on a standard operating table.  The head was placed on a Anthony horseshoe and rotated to expose the right side of the head.  Hair was removed with surgical clippers.  The scalp was cleansed with isopropyl alcohol.  Stealth was registered and the lesion was mapped.  An incision was planned with a skin marker.  DuraPrep was applied followed by standard sterile surgical draping. Lidocaine with epinephrine was injected.  Another timeout was performed.     A skin incision was made with a #10 scalpel blade.  Myocutaneous dissection was performed with Bovie electrocautery.  Self-retaining retractor was placed.  The lesion was again localized with stealth and a craniotomy was planned.  A kera hole was placed.  Intraoperative ultrasound was used to  evaluate the abscess, however there was incomplete visualization of the lesion, therefore a small craniotomy was fashioned using Kerrison rongeur's until the lesion was able to be fully visualized with ultrasound, and images were saved in the medical record. Meticulous extradural hemostasis was assured with bipolar electrocautery and FloSeal. A cruciform dural incision was made with a #15 scalpel blade and Metzenbaum scissors.  A small corticotomy was made with the bipolar electrocautery and there was a brisk return of yellow purulence which was gathered with a syringe.  A 24-gauge Angiocath was then passed into the abscess cavity and further purulence was aspirated.  Once no additional fluid would return, the ultrasound was once again used to confirm complete collapse of the abscess cavity.  The cavity was irrigated copiously, including with antibiotic irrigation.  The ultrasound was once again used to reconfirm complete cavity collapse.  The incision was irrigated copiously, then closed in layers.  Temporalis was closed with 0 Vicryls.  Galea was closed with inverted 2-0 Vicryls.  Skin was closed with staples.  Drapes were removed.  Bacitracin and a dressing were applied.  The patient was prepared for emergence and extubation.    DISPOSITION:  Neurosurgical intensive care unit.    CONDITION:  Stable, extubated, neurologically at baseline.    COUNTS:  All needle, sponge and cottonoid counts were reported correct at the end of the operation.    COMPLICATIONS:  None.    SPECIMEN:  Right temporal abscess for anaerobic, aerobic, fungal, and AFB cultures    WOUND CLASSIFICATION:  4

## 2025-02-01 NOTE — ANESTHESIA PROCEDURE NOTES
Airway  Date/Time: 1/31/2025 7:38 PM  Urgency: elective      General Information and Staff    Patient location during procedure: OR  Anesthesiologist: Chava Jay MD  Performed: anesthesiologist   Performed by: Chava Jay MD  Authorized by: Chava Jay MD      Indications and Patient Condition  Indications for airway management: anesthesia  Sedation level: deep  Preoxygenated: yes  Patient position: sniffing  Mask difficulty assessment: 1 - vent by mask    Final Airway Details  Final airway type: endotracheal airway      Successful airway: ETT  Cuffed: yes   Successful intubation technique: Video laryngoscopy  Endotracheal tube insertion site: oral  Blade: GlideScope  Blade size: #3  ETT size (mm): 8.0    Placement verified by: capnometry   Measured from: lips  ETT to lips (cm): 22  Number of attempts at approach: 1    Additional Comments  atraumatic

## 2025-02-01 NOTE — SLP NOTE
ADULT SWALLOWING EVALUATION    ASSESSMENT    ASSESSMENT/OVERALL IMPRESSION:  Pt seen for BSSE. Pt admitted for right temporal brain mass, s/p right craniotomy for lesion resection, pt also to have abscess in the area of lesion.  Pt received in bed, family at bedside, doing well, verbal, following commands and answering questions.  Pt agreeable to po trials. Pt given soft solids (fruit) and thin liquids by straw.   Pt with good acceptance, containment and timeliness with both consistencies. Good mastication, however, pt complained of pain with chewing and requested to have soft foods and puddings that do not require chewing for the time being. RN aware. No overt s/s of aspiration.  Recommend to continue regular consistency with thin liquids. Pt to order foods that are easier to chew at their own discretion. RN aware. Will continue to monitor.         RECOMMENDATIONS   Diet Recommendations - Solids: Regular  Diet Recommendations - Liquids: Thin Liquids                        Compensatory Strategies Recommended: Alternate consistencies  Aspiration Precautions: Upright position;Slow rate;Small bites;Small sips  Medication Administration Recommendations: No restrictions  Treatment Plan/Recommendations: Dysphagia therapy;Communication evaluation    HISTORY   MEDICAL HISTORY  Reason for Referral: R/O aspiration    Problem List  Principal Problem:    Brain mass      Past Medical History  Past Medical History:    Allergic rhinitis    Essential hypertension    Hemorrhoids    High cholesterol    Hyperlipidemia       Prior Living Situation: Home with spouse  Diet Prior to Admission: Regular;Thin liquids           SWALLOWING HISTORY  Current Diet Consistency: Regular;Thin liquids  Dysphagia History: None, pt has not been seenby our ST service previously.  Imaging Results: CONCLUSION:  Interval postsurgical changes status post resection of a mass in the right temporal lobe..     SUBJECTIVE       OBJECTIVE   ORAL MOTOR  EXAMINATION  Dentition: Natural  Symmetry: Within Functional Limits  Strength: Within Functional Limits  Tone: Within Functional Limits  Range of Motion: Within Functional Limits  Rate of Motion: Within Functional Limits    Voice Quality: Clear  Respiratory Status: Unlabored  Consistencies Trialed: Thin liquids;Soft solid  Method of Presentation: Self presentation  Patient Positioned: Upright    Oral Phase of Swallow: Within Functional Limits                      Pharyngeal Phase of Swallow: Within Functional Limits           (Please note: Silent aspiration cannot be evaluated clinically. Videofluoroscopic Swallow Study is required to rule-out silent aspiration.)    Esophageal Phase of Swallow: No complaints consistent with possible esophageal involvement                GOALS  Goal #1 The patient will tolerate regular consistency and thin liquids without overt signs or symptoms of aspiration with 100 % accuracy over 1-2 session(s).  In Progress   Goal #2 The patient/family/caregiver will demonstrate understanding and implementation of aspiration precautions and swallow strategies independently over 1-2 session(s).    In Progress   Goal #3 Communication eval per protocol.  Not Addressed     FOLLOW UP  Treatment Plan/Recommendations: Dysphagia therapy;Communication evaluation     Follow Up Needed (Documentation Required): Yes  SLP Follow-up Date: 02/03/25    Thank you for your referral.   If you have any questions, please contact ZULMA Murphy

## 2025-02-01 NOTE — BRIEF OP NOTE
Pre-Operative Diagnosis: Brain lesion [G93.9]     Post-Operative Diagnosis: Brain lesion [G93.9]      Procedure Performed:   RIGHT CRANIOTOMY FOR LESION RESECTION WITH ULTRASOUND    Surgeons and Role:     * Indra Thakur MD - Primary    Assistant(s):   None     Surgical Findings: Yellow purulence      Specimen: Right frontal abscess for anaerobic, aerobic, fungal, and AFB culture     Estimated Blood Loss: Blood Output: 5 mL (1/31/2025  9:20 PM)      Plan:  Q1' neuro checks  Head CT post procedure  Abx per ID - spoke with Dr. Huff following surgery  Keppra x 7 days minimum  -160

## 2025-02-01 NOTE — PHYSICAL THERAPY NOTE
PHYSICAL THERAPY EVALUATION - INPATIENT     Room Number: 6620/6620-A  Evaluation Date: 2/1/2025  Type of Evaluation: Initial  Physician Order: PT Eval and Treat    Presenting Problem: brain mass s/p R crani for lesion resection  Co-Morbidities : HTN  Reason for Therapy: Mobility Dysfunction and Discharge Planning    PHYSICAL THERAPY ASSESSMENT   Patient is a 54 year old male admitted 1/30/2025 for brain mass s/p R crani for lesion resection 1/31.   Patient is currently functioning near baseline with bed mobility, transfers, gait, stair negotiation, maintaining seated position, and standing prolonged periods. Prior to admission, patient's baseline is fully independent, works from home and drives.     Patient will benefit from continued skilled PT Services for duration of hospitalization, however, given the patient is functioning near baseline level do not anticipate skilled therapy needs at discharge .    PLAN  Patient has been evaluated and presents with no skilled Physical Therapy needs at this time.  Patient discharged from Physical Therapy services.  Please re-order if a new functional limitation presents during this admission.    PT Device Recommendation: None    GOALS  Patient was able to achieve the following goals ...    Patient was able to transfer At previous, functional level   Patient able to ambulate on level surfaces At previous, functional level     HOME SITUATION  Type of Home: House  Home Layout: Two level  Stairs to Enter : 0        Stairs to Bedroom: 12    Railing: Yes    Lives With: Spouse    Drives: Yes   Patient Regularly Uses: Reading glasses     Prior Level of [place]: Patient reports he functions fully independently at baseline, works from home and drives. Resides with his wife.    SUBJECTIVE  \"I have a headache.\"     OBJECTIVE  Precautions: Limb alert - right;Seizure (-160)  Fall Risk: Standard fall risk    WEIGHT BEARING RESTRICTION     PAIN ASSESSMENT  Rating: 3  Location:  headache  Management Techniques: Activity promotion;Repositioning;Relaxation    COGNITION  Overall Cognitive Status:  WFL - within functional limits  Following Commands:  follows all commands and directions without difficulty    RANGE OF MOTION AND STRENGTH ASSESSMENT  Upper extremity ROM and strength - defer to OT assessment    Lower extremity ROM is within functional limits     Lower extremity strength is within functional limits     BALANCE  Static Sitting: Good  Dynamic Sitting: Good  Static Standing: Good  Dynamic Standing: Good    ADDITIONAL TESTS                                    ACTIVITY TOLERANCE  Pulse: 69  Heart Rate Source: Monitor  Resp: 13  BP: 129/84  BP Location: Left arm  BP Method: Automatic  Patient Position: Lying    O2 WALK  Oxygen Therapy  SPO2% on Room Air at Rest: 97    NEUROLOGICAL FINDINGS  Neurological Findings: Coordination - Heel to [place];Sensation     Coordination - Heel to [place]: Symmetrical     Sensation: patient denies any numbness/tingling BLE's         AM-PAC '6-Clicks' INPATIENT SHORT FORM - BASIC MOBILITY  How much difficulty does the patient currently have...  Patient Difficulty: Turning over in bed (including adjusting bedclothes, sheets and blankets)?: None   Patient Difficulty: Sitting down on and standing up from a chair with arms (e.g., wheelchair, bedside commode, etc.): None   Patient Difficulty: Moving from lying on back to sitting on the side of the bed?: None   How much help from another person does the patient currently need...   Help from Another: Moving to and from a bed to a chair (including a wheelchair)?: None   Help from Another: Need to walk in hospital room?: None   Help from Another: Climbing 3-5 steps with a railing?: None       AM-PAC Score:  Raw Score: 24   Approx Degree of Impairment: 0%   Standardized Score (AM-PAC Scale): 61.14   CMS Modifier (G-Code): CH    FUNCTIONAL ABILITY STATUS  Gait Assessment   Functional Mobility/Gait Assessment  Gait  Assistance: Supervision  Distance (ft): 150  Assistive Device: None  Pattern: Within Functional Limits  Stairs: Stairs  How Many Stairs: 12  Device: 1 Rail (simulated home environment)  Assist: Supervision  Pattern: Ascend and Descend  Ascend and Descend :  (mixed - step-to and reciprocal)    Skilled Therapy Provided     Bed Mobility:  Rolling: independent  Supine to sit: supervision   Sit to supine: supervision     Transfer Mobility:  Sit to stand: supervision   Stand to sit: supervision  Gait = x150 ft., supervision    Therapist's comments: Patient presents to PT supine in bed, RN present and dc'ing A-line and patient agreeable to therapy evaluation. Patient progressed well with PT performing bed mobility, transfers, ambulation within the halls and stair training all with supervision. Patient requesting return to supine in bed upon return to his room to rest due to headache. Patient appears to be functioning near his baseline, thus no further acute skilled IP PT goals warranted at this time, will dc PT. RN updated. Encouraged patient to continue mobilizing with nursing staff- patient agreeable.     Exercise/Education Provided:  Energy conservation  Functional activity tolerated  Gait training  Posture  Strengthening  Transfer training    Patient End of Session: In bed;Needs met;Call light within reach;RN aware of session/findings;All patient questions and concerns addressed;Hospital anti-slip socks    Patient Evaluation Complexity Level:  History Low - no personal factors and/or co-morbidities   Examination of body systems Low -  addressing 1-2 elements   Clinical Presentation Low- Stable   Clinical Decision Making Low Complexity       PT Session Time: 27 minutes  Gait Training: 15 minutes  Therapeutic Activity: 3 minutes  Neuromuscular Re-education: 0 minutes  Therapeutic Exercise: 0 minutes

## 2025-02-01 NOTE — CONSULTS
[facility]  Neurocritical Care Consult Note    Devora Hess Patient Status:  Inpatient    1970 MRN [id number]   Location [facility] 6NE-A Attending Alex Byers MD   Hosp Day # 2 PCP Yesica Schwarz DO       Reason for Consultation:   Postop icu monitoring    HPI:   Patient is a 54 year old male with a h/o htn, hl p/w R temp mass . Pt c/o progressive R sided jaw pain and HA x2 weeks then n/v thus came to ED  where w/u revealed ct head with 2.6cm R temp mass c/f abscess vs malignancy thus started on abx and underwent R crani for mass resection , and pt was transferred to cnicu for further monitoring.     Past Medical History:    Allergic rhinitis    Essential hypertension    Hemorrhoids    High cholesterol    Hyperlipidemia       Past Surgical History:   Procedure Laterality Date    Colonoscopy         Prescriptions Prior to Admission[1]  Allergies[2]  Social History     Socioeconomic History    Marital status:    Tobacco Use    Smoking status: Never    Smokeless tobacco: Never   Vaping Use    Vaping status: Never Used   Substance and Sexual Activity    Alcohol use: Never    Drug use: Never   Other Topics Concern    Caffeine Concern No    Exercise Yes    Seat Belt Yes    Special Diet No    Stress Concern No    Weight Concern No     Family History   Problem Relation Age of Onset    Heart Attack Father         2005    Heart Disorder Father        Current Meds:  Current Facility-Administered Medications   Medication Dose Route Frequency    labetalol (Trandate) 5 mg/mL injection 10 mg  10 mg Intravenous Q4H PRN    hydrALAzine (Apresoline) 20 mg/mL injection 10 mg  10 mg Intravenous Q4H PRN    sodium chloride 0.9% infusion   Intravenous Continuous    cefTRIAXone (Rocephin) 2 g in sodium chloride 0.9% 100 mL IVPB-ADDV  2 g Intravenous Q12H    metroNIDAZOLE in sodium chloride 0.79% (Flagyl) 5 mg/mL IVPB premix 500 mg  500 mg Intravenous Q8H    niCARdipine in sodium  chloride 0.86% (carDENE) 20 mg/200mL infusion premix  5-15 mg/hr Intravenous Continuous PRN    HYDROcodone-acetaminophen (Norco) 5-325 MG per tab 1 tablet  1 tablet Oral Q4H PRN    Or    HYDROcodone-acetaminophen (Norco) 5-325 MG per tab 2 tablet  2 tablet Oral Q4H PRN    morphINE PF 2 MG/ML injection 2 mg  2 mg Intravenous Q1H PRN    rosuvastatin (Crestor) tab 20 mg  20 mg Oral Nightly    [Transfer Hold] lisinopril (Zestril) tab 10 mg  10 mg Oral Daily    acetaminophen (Tylenol Extra Strength) tab 500 mg  500 mg Oral Q4H PRN    melatonin tab 3 mg  3 mg Oral Nightly PRN    glycerin-hypromellose- (Artificial Tears) 0.2-0.2-1 % ophthalmic solution 1 drop  1 drop Both Eyes QID PRN    sodium chloride (Saline Mist) 0.65 % nasal solution 1 spray  1 spray Each Nare Q3H PRN    polyethylene glycol (PEG 3350) (Miralax) 17 g oral packet 17 g  17 g Oral Daily PRN    sennosides (Senokot) tab 17.2 mg  17.2 mg Oral Nightly PRN    bisacodyl (Dulcolax) 10 MG rectal suppository 10 mg  10 mg Rectal Daily PRN    fleet enema (Fleet) rectal enema 133 mL  1 enema Rectal Once PRN    ondansetron (Zofran) 4 MG/2ML injection 4 mg  4 mg Intravenous Q6H PRN    prochlorperazine (Compazine) 10 MG/2ML injection 5 mg  5 mg Intravenous Q8H PRN    levETIRAcetam (Keppra) 500 mg/5mL injection 500 mg  500 mg Intravenous Q12H       Review of Systems:     Constitutional:    Denies unusual weight loss or weight gain, fever/chills or night sweats.  HEENT:                Denies changes in vision or difficulty swallowing.  Pulm:                    Denies dyspnea, cough, or sputum production  Cardiac:               Denies chest pain, palpitations or lower extremity edema.  GI:                         Denies constipation, heartburn or melena.  :                       Denies dysuria or hematuria.  Skin:                     Denies rashes or open areas.  Neuro:                  As per HPI    All other systems were reviewed and are negative.    Vitals:    Temp:  [96.7 °F (35.9 °C)-99.4 °F (37.4 °C)] 97.7 °F (36.5 °C)  Pulse:  [63-89] 63  Resp:  [13-19] 14  BP: (109-145)/(64-93) 119/74  SpO2:  [93 %-100 %] 93 %  AO: (115-171)/(54-87) 115/87  Body mass index is 24.76 kg/m².    Intake/Output:    Intake/Output Summary (Last 24 hours) at 2/1/2025 1010  Last data filed at 2/1/2025 0600  Gross per 24 hour   Intake 1280.67 ml   Output 1755 ml   Net -474.33 ml       Physical Examination:   General- No acute distress  CV- RRR  Resp- CTA Bilat  Neuro-  Mental status- awake and alert, regards and follows commands, oriented x3, speech fluent  Cranial nerves- pupils equally round and reactive to light, extraocular muscles intact, face symmetric, visual fields full  Motor- 5/5 throughout  Sens-  Intact to light touch    Diagnostics:   CT BRAIN OR HEAD (CPT=70450)    Result Date: 2/1/2025  CONCLUSION:  Interval postsurgical changes status post resection of a mass in the right temporal lobe..    LOCATION:  [facility]   Dictated by (CST): Chong Nj MD on 2/01/2025 at 0:04 AM     Finalized by (CST): Chong Nj MD on 2/01/2025 at 0:06 AM       US INTRAOPERATIVE GUIDANCE (CPT=76998)    Result Date: 1/31/2025  CONCLUSION:  Intraoperative sonographic guidance for debridement of a brain abscess.  Please refer to the operative note for further details.   LOCATION:  [facility]    Dictated by (CST): Chong Nj MD on 1/31/2025 at 9:37 PM     Finalized by (CST): Chong Nj MD on 1/31/2025 at 9:38 PM       CT CHEST+ABDOMEN+PELVIS(ALL [facility] ONLY)(CPT=71260/93523)    Result Date: 1/31/2025  CONCLUSION:  1. There is no specific evidence of metastatic disease in the chest, abdomen or pelvis. 2. 4 mm right middle lobe lung nodule is of uncertain significance.  This could be followed with serial examinations at an appropriate interval based on patient's known clinical history. 3. Other incidental findings are given above.     LOCATION:  [facility]    Dictated by (CST): Cade Castaneda MD on 1/31/2025  at 11:55 AM     Finalized by (CST): Cade Castaneda MD on 1/31/2025 at 12:00 PM        Lab Review     Recent Labs   Lab 01/30/25  1409 01/31/25  0524 02/01/25  0420   * 136 136   K 3.9 4.1 3.7    101 101   CO2 28.0 27.0 25.0   * 113* 136*   BUN 10 10 10   CREATSERUM 0.98 0.86 0.73     Recent Labs   Lab 01/30/25  1409 01/31/25  0524 02/01/25  0420   WBC 14.6* 12.5* 13.0*   HGB 15.8 14.5 13.3   .0 323.0 266.0       Assesment/Plan:     Neuro:  R temp mass- differential includes neoplastic vs infectious process.   Now s/p crani for resection, path pending.   Postop per Neurosurg.   Cont keppra for seizure prophylaxis.  Cont neurochecks/pt/ot/st.  No further neurocritical care needs at this time, further management and outpt follow-up per Neurosurg/ID, will follow peripherally please call with any questions.   Cardiac:  sbp goal 100-160, cardene gtt as needed  Pulmonary:  Stable on RA  Renal:  IVFs, monitor BUN/Cr  GI:  PO as tolerated  Heme/ID:  Afebrile, trend leukocytosis. Empiric abx per ID  Endocrine/Rheum:  Monitor glucose, sliding scale insulin prn  DVT Prophylaxis:  SCD’s    Goals of the Day: neurochecks   A total of 45 minutes of critical care time (exclusive of billable procedures) was administered to manage and/or prevent neurologic instability. This involved direct patient intervention, complex decision making, and/or extensive discussions with the patient, family, and clinical staff.    Thank you for allowing me to participate in the care of this patient.     Kristen Barrios MD, [facility]  Medical Director of System Neurosciences  Chief, Section of Neurocritical Care  [facility]           [1]   Medications Prior to Admission   Medication Sig Dispense Refill Last Dose/Taking    lisinopril-hydroCHLOROthiazide 10-12.5 MG Oral Tab Take 1 tablet by mouth daily. 90 tablet 3 1/29/2025 at  9:00 AM    rosuvastatin 20 MG Oral Tab Take 1 tablet (20 mg total)  by mouth daily. 90 tablet 3 2025 at  9:00 PM    Omega-3 Fatty Acids (FISH OIL) 1000 MG Oral Capsule Delayed Release Take 1,000 mg by mouth daily.  0 2025 at  9:00 AM    Multiple Vitamin (MULTI-VITAMIN DAILY) Oral Tab Take by mouth.   2025 at  9:00 AM    Coenzyme Q10 (COQ10) 100 MG Oral Cap Take by mouth.   2025 at  9:00 AM    cyclobenzaprine 10 MG Oral Tab Take 1 tablet (10 mg total) by mouth every 6 (six) hours as needed. (Patient not taking: Reported on 2025)   Unknown    [] predniSONE 20 MG Oral Tab Take 2 tablets (40 mg total) by mouth daily for 5 days. 10 tablet 0    [2] No Known Allergies

## 2025-02-01 NOTE — ANESTHESIA PREPROCEDURE EVALUATION
PRE-OP EVALUATION    Patient Name: Devora Hess    Admit Diagnosis: Brain mass [G93.89]    Pre-op Diagnosis: Brain lesion [G93.9]    RIGHT CRANIOTOMY FOR LESION RESECTION WITH ULTRASOUND    Anesthesia Procedure: RIGHT CRANIOTOMY FOR LESION RESECTION WITH ULTRASOUND (Right)    Surgeons and Role:     * Indra Thakur MD - Primary    Pre-op vitals reviewed.  Temp: 99.4 °F (37.4 °C)  Pulse: 79  Resp: 19  BP: 121/71  SpO2: 97 %  Body mass index is 24.76 kg/m².    Current medications reviewed.  Hospital Medications:   sodium chloride 0.9% infusion   Intravenous Continuous    [COMPLETED] iopamidol 76% (ISOVUE-370) injection for power injector  100 mL Intravenous ONCE PRN    cefTRIAXone (Rocephin) 2 g in sodium chloride 0.9% 100 mL IVPB-ADDV  2 g Intravenous Q12H    metroNIDAZOLE in sodium chloride 0.79% (Flagyl) 5 mg/mL IVPB premix 500 mg  500 mg Intravenous Q8H    [COMPLETED] metoclopramide (Reglan) 5 mg/mL injection 5 mg  5 mg Intravenous Once    [COMPLETED] diphenhydrAMINE (Benadryl) 50 mg/mL  injection 12.5 mg  12.5 mg Intravenous Once    [COMPLETED] iopamidol (ISOVUE-370) 76 % injection 60 mL  60 mL Intravenous ONCE PRN    [COMPLETED] levETIRAcetam (Keppra) 500 mg/5mL injection 500 mg  500 mg Intravenous Once    [COMPLETED] gadoterate meglumine (Dotarem) 7.5 MMOL/15ML injection 27 mL  27 mL Intravenous ONCE PRN    [Transfer Hold] rosuvastatin (Crestor) tab 20 mg  20 mg Oral Nightly    [Transfer Hold] lisinopril (Zestril) tab 10 mg  10 mg Oral Daily    [Transfer Hold] acetaminophen (Tylenol Extra Strength) tab 500 mg  500 mg Oral Q4H PRN    [Transfer Hold] melatonin tab 3 mg  3 mg Oral Nightly PRN    [Transfer Hold] glycerin-hypromellose- (Artificial Tears) 0.2-0.2-1 % ophthalmic solution 1 drop  1 drop Both Eyes QID PRN    [Transfer Hold] sodium chloride (Saline Mist) 0.65 % nasal solution 1 spray  1 spray Each Nare Q3H PRN    [Transfer Hold] polyethylene glycol (PEG 3350) (Miralax) 17 g oral packet 17 g   17 g Oral Daily PRN    [Transfer Hold] sennosides (Senokot) tab 17.2 mg  17.2 mg Oral Nightly PRN    [Transfer Hold] bisacodyl (Dulcolax) 10 MG rectal suppository 10 mg  10 mg Rectal Daily PRN    [Transfer Hold] fleet enema (Fleet) rectal enema 133 mL  1 enema Rectal Once PRN    [Transfer Hold] ondansetron (Zofran) 4 MG/2ML injection 4 mg  4 mg Intravenous Q6H PRN    [Transfer Hold] prochlorperazine (Compazine) 10 MG/2ML injection 5 mg  5 mg Intravenous Q8H PRN    [COMPLETED] acetaminophen (Tylenol Extra Strength) tab 1,000 mg  1,000 mg Oral Once    [COMPLETED] cefTRIAXone (Rocephin) 2 g in sodium chloride 0.9% 100 mL IVPB-ADDV  2 g Intravenous Once    [Transfer Hold] levETIRAcetam (Keppra) 500 mg/5mL injection 500 mg  500 mg Intravenous Q12H    [COMPLETED] vancomycin (Vancocin) 2 g in sodium chloride 0.9% 500mL IVPB premix  25 mg/kg Intravenous Once       Outpatient Medications:   Prescriptions Prior to Admission[1]    Allergies: Patient has no known allergies.      Anesthesia Evaluation    Patient summary reviewed.    Anesthetic Complications  (-) history of anesthetic complications         GI/Hepatic/Renal                                 Cardiovascular                  (+) hypertension   (+) hyperlipidemia                                  Endo/Other                                  Pulmonary                           Neuro/Psych                              Brain mass        Past Surgical History:   Procedure Laterality Date    Colonoscopy       Social History     Socioeconomic History    Marital status:    Tobacco Use    Smoking status: Never    Smokeless tobacco: Never   Vaping Use    Vaping status: Never Used   Substance and Sexual Activity    Alcohol use: Never    Drug use: Never   Other Topics Concern    Caffeine Concern No    Exercise Yes    Seat Belt Yes    Special Diet No    Stress Concern No    Weight Concern No     History   Drug Use Unknown     Available pre-op labs reviewed.  Lab Results    Component Value Date    WBC 12.5 (H) 01/31/2025    RBC 5.18 01/31/2025    HGB 14.5 01/31/2025    HCT 41.3 01/31/2025    MCV 79.7 (L) 01/31/2025    MCH 28.0 01/31/2025    MCHC 35.1 01/31/2025    RDW 12.3 01/31/2025    .0 01/31/2025     Lab Results   Component Value Date     01/31/2025    K 4.1 01/31/2025     01/31/2025    CO2 27.0 01/31/2025    BUN 10 01/31/2025    CREATSERUM 0.86 01/31/2025     (H) 01/31/2025    CA 8.9 01/31/2025     Lab Results   Component Value Date    INR 1.10 01/31/2025         Airway      Mallampati: III  Mouth opening: 3 FB  TM distance: 4 - 6 cm   Cardiovascular             Dental             Pulmonary                     Other findings              ASA: 3   Plan: general  NPO status verified and     Post-procedure pain management plan discussed with surgeon and patient.    Comment: GETA/LMA discussed in detail.  Risk of complications discussed including but not limited to sore throat, cough, PONV discussed. Also, discussed risks including dental injury particularly on any weakened, treated or diseased teeth & pt wishes to proceed  All questions answered.    Plan/risks discussed with: patient  Use of blood product(s) discussed with: patient    Consented to blood products.          Present on Admission:  **None**             [1]   Medications Prior to Admission   Medication Sig Dispense Refill Last Dose/Taking    lisinopril-hydroCHLOROthiazide 10-12.5 MG Oral Tab Take 1 tablet by mouth daily. 90 tablet 3 1/29/2025 at  9:00 AM    rosuvastatin 20 MG Oral Tab Take 1 tablet (20 mg total) by mouth daily. 90 tablet 3 1/29/2025 at  9:00 PM    Omega-3 Fatty Acids (FISH OIL) 1000 MG Oral Capsule Delayed Release Take 1,000 mg by mouth daily.  0 1/29/2025 at  9:00 AM    Multiple Vitamin (MULTI-VITAMIN DAILY) Oral Tab Take by mouth.   1/29/2025 at  9:00 AM    Coenzyme Q10 (COQ10) 100 MG Oral Cap Take by mouth.   1/29/2025 at  9:00 AM    cyclobenzaprine 10 MG Oral Tab Take 1  tablet (10 mg total) by mouth every 6 (six) hours as needed. (Patient not taking: Reported on 2025)   Unknown    [] predniSONE 20 MG Oral Tab Take 2 tablets (40 mg total) by mouth daily for 5 days. 10 tablet 0

## 2025-02-01 NOTE — PROGRESS NOTES
[facility]  Neurosurgery Progress Note    Devora Hess Patient Status:  Inpatient    1970 MRN [id number]   Location [facility] 6NE-A Attending Alex Byers MD   Hosp Day # 2 PCP Yesica Schwarz DO     Chief Complaint:  Cade abscess    POD #1 s/p craniotomy with lesion resection     Subjective:  No events overnight. Post op CT head stable.    Objective/Physical Exam:    Vital Signs:  Blood pressure 126/81, pulse 66, temperature 97.7 °F (36.5 °C), temperature source Temporal, resp. rate 13, height 71\", weight 177 lb 8 oz (80.5 kg), SpO2 96%.  Respiratory:  nonlabored  CV:  well perfused  General: NAD, Speech Fluent, Mood Appropriate  Neurologic:   A&Ox3  PERRL, EOMi, FS, TM  Full strength x 4, no drift  Skin: Dressings removed. Staples MORAIMA.    Labs:  Recent Labs   Lab 25  1409 25  0525  0420   RBC 5.58 5.18 4.78   HGB 15.8 14.5 13.3   HCT 44.8 41.3 37.7*   MCV 80.3 79.7* 78.9*   MCH 28.3 28.0 27.8   MCHC 35.3 35.1 35.3   RDW 12.3 12.3 12.1   NEPRELIM 12.19* 10.09*  --    WBC 14.6* 12.5* 13.0*   .0 323.0 266.0       Recent Labs   Lab 25  1409 25  0524 25  0420   * 113* 136*   BUN 10 10 10   CREATSERUM 0.98 0.86 0.73   EGFRCR 92 104 109   CA 9.7 8.9 8.1*   * 136 136   K 3.9 4.1 3.7    101 101   CO2 28.0 27.0 25.0       Imaging:  CT BRAIN OR HEAD (CPT=70450)    Result Date: 2025  CONCLUSION:  Interval postsurgical changes status post resection of a mass in the right temporal lobe..    LOCATION:  [place]   Dictated by (CST): Chong Nj MD on 2025 at 0:04 AM     Finalized by (CST): Chong Nj MD on 2025 at 0:06 AM       US INTRAOPERATIVE GUIDANCE (CPT=76998)    Result Date: 2025  CONCLUSION:  Intraoperative sonographic guidance for debridement of a brain abscess.  Please refer to the operative note for further details.   LOCATION:  [place]    Dictated by (CST): Chong Nj MD on 2025 at 9:37 PM      Finalized by (CST): Chong Nj MD on 1/31/2025 at 9:38 PM       CT CHEST+ABDOMEN+PELVIS(ALL CNTRST ONLY)(CPT=71260/86023)    Result Date: 1/31/2025  CONCLUSION:  1. There is no specific evidence of metastatic disease in the chest, abdomen or pelvis. 2. 4 mm right middle lobe lung nodule is of uncertain significance.  This could be followed with serial examinations at an appropriate interval based on patient's known clinical history. 3. Other incidental findings are given above.     LOCATION:  [place]    Dictated by (CST): Cade Castaneda MD on 1/31/2025 at 11:55 AM     Finalized by (CST): Cade Castaneda MD on 1/31/2025 at 12:00 PM       XR CEPHALOMETRIC XRAY  (CPT=70350)    Result Date: 1/31/2025  PROCEDURE:  XR CEPHALOMETRIC XRAY  (CPT=70350)  TECHNIQUE:    Head pain and right-sided dental pain.  Recent diagnosis intracranial mass involving the right temporal region.  COMPARISON:[facility], MR, MRI BRAIN (W+WO) (CPT=70553), 1/30/2025, 5:14 PM.  INDICATIONS:  Right dental pain in the setting of brain mass  PATIENT STATED HISTORY: (As transcribed by Technologist)  Patient offered no additional information at this time.    FINDINGS:    No focal bony calvarial lesions are identified.  Paranasal sinuses are well pneumatized.  IMPRESSION:  Negative exam.   LOCATION:  [facility]     Dictated by (CST): Hemant Castaneda DO on 1/31/2025 at 9:16 AM     Finalized by (CST): Hemant Castaneda DO on 1/31/2025 at 9:18 AM       MRI BRAIN (W+WO) (CPT=70553)    Result Date: 1/30/2025  CONCLUSION:  The known lesion of the right temporal lobe demonstrates peripheral enhancement with probable adjacent pachymeningeal enhancement and dural thickening.  There is central restricted diffusion.  Differential considerations would include a cerebral abscess or glioblastoma, with other etiologies such as metastasis, lymphoma and tumefactive demyelinating disease possible but felt to be less likely.  Critical results were discussed with Dr. Farmer by  Dr. Correa at approximately 1817 on 1/30/25.  Read back was performed.    LOCATION:  [place]    Dictated by (CST): Garret Correa MD on 1/30/2025 at 6:08 PM     Finalized by (CST): Garret Correa MD on 1/30/2025 at 6:18 PM       CTA BRAIN (CPT=70496)    Result Date: 1/30/2025  CONCLUSION:  1. There is a 2.6 cm hypodense mass within the anterior inferior right temporal lobe.  There is surrounding hypoattenuation within the anterior right temporal lobe which may represent edema.  This mass may represent abscess, primary glial neoplasm, or metastatic disease.  A contrast-enhanced brain MRI is recommended for further assessment. 2. No high-grade stenosis, occlusion, or aneurysm is identified within the major intracranial arterial vasculature.    Critical results were discussed with Dr. Farmer at 1633 hours on 1/30/2025. Critical results were read back.   LOCATION:  [facility]   Dictated by (CST): Stromberg, LeRoy, MD on 1/30/2025 at 4:16 PM     Finalized by (CST): Stromberg, LeRoy, MD on 1/30/2025 at 4:37 PM           Assessment:  53 yo male with brain abscess s/p craniotomy and lesion resection    Plan:  Medical management per NCC/Hospitalist  Neuro checks q2, if stable ok to transfer to floor in the afternoon and change neuro checks to q4  Appreciate ID recommendations continue antibiotics  Keppra x days  Pain meds as needed  Therapy as tolerated  DVT prophylaxis SCD  MRI brain w/wo    Is this a shared or split note between Advanced Practice Provider and Physician? Yes       ANTONY Baltazar  [facility]  2/1/2025, 11:37 AM  Spectre [id number]

## 2025-02-01 NOTE — PROGRESS NOTES
[facility]   part of [facility]     Hospitalist Progress Note     Devora Hess Patient Status:  Inpatient    1970 MRN [id number]   Location [facility] 7NE-A Attending Alex Byers MD   Hosp Day # 2 PCP Yesica Schwarz DO     Chief Complaint: Brain mass    Subjective:     Patient had craniotomy with brain mass resection yesterday, transferred to ICU thereafter, on cardene gtt, pain controlled, moving all extremities.    Objective:    Review of Systems:   A comprehensive review of systems was completed; pertinent positive and negatives stated in subjective.    Vital signs:  Temp:  [96.7 °F (35.9 °C)-99.4 °F (37.4 °C)] 98.2 °F (36.8 °C)  Pulse:  [67-89] 84  Resp:  [13-19] 13  BP: (109-145)/(64-93) 125/76  SpO2:  [94 %-100 %] 94 %  AO: (128-171)/(54-77) 128/65    Physical Exam:    General: No acute distress  Respiratory: No wheezes, no rhonchi  Cardiovascular: S1, S2, regular rate and rhythm  Abdomen: Soft, Non-tender, non-distended, positive bowel sounds  Neuro: No new focal deficits.   Extremities: No edema      Diagnostic Data:    Labs:  Recent Labs   Lab 25  1409 25  0524 25  0843 25  0420   WBC 14.6* 12.5*  --  13.0*   HGB 15.8 14.5  --  13.3   MCV 80.3 79.7*  --  78.9*   .0 323.0  --  266.0   INR  --   --  1.10  --        Recent Labs   Lab 25  1409 25  0524 25  0420   * 113* 136*   BUN 10 10 10   CREATSERUM 0.98 0.86 0.73   CA 9.7 8.9 8.1*   ALB 4.7  --   --    * 136 136   K 3.9 4.1 3.7    101 101   CO2 28.0 27.0 25.0   ALKPHO 86  --   --    AST 16  --   --    ALT 50*  --   --    BILT 0.7  --   --    TP 8.1  --   --        Estimated Glomerular Filtration Rate: 108.8 mL/min/1.73m2 (by CKD-EPI based on SCr of 0.73 mg/dL).    No results for input(s): \"TROP\", \"TROPHS\", \"CK\" in the last 168 hours.    Recent Labs   Lab 25  0843   PTP 14.3   INR 1.10                  Microbiology    Hospital Encounter on 25   1. Blood  Culture     Status: None (Preliminary result)    Collection Time: 01/30/25  5:12 PM    Specimen: Blood,peripheral   Result Value Ref Range    Blood Culture Result No Growth 1 Day N/A         Imaging: Reviewed in Epic.    Medications:    cefTRIAXone  2 g Intravenous Q12H    metroNIDAZOLE  500 mg Intravenous Q8H    rosuvastatin  20 mg Oral Nightly    [Transfer Hold] lisinopril  10 mg Oral Daily    levETIRAcetam  500 mg Intravenous Q12H       Assessment & Plan:      #Brain mass c/w right frontal abscess  #Headaches   CT noted 2.6cm hypodense mass  MRI brain shows similar  S/p right craniotomy with resection   Blood cultures sent to r/o abscess - started rocephin and vanc however procal and wbc wnl  Keppra 500mg IV BID for seizure prophylaxis  Neurosurgery consult, dental xrays negative, CT c/a/p with no metastatic disease   ID and neurosurgery following     #Essential HTN  Lisinopril     #HLD  Statin      #Mild hyponatremia   Resolved          Kimberly Gee MD    Supplementary Documentation:     Quality:  DVT Mechanical Prophylaxis: LOUIE hose, SCDs, Early ambuation  DVT Pharmacologic Prophylaxis   Medication   None                Code Status: Full Code  [facility]: No urinary catheter in place  [facility] Duration (in days):   Central line:    W[facility]ACE:     Discharge is dependent on: progress  At this point Mr. Hess is expected to be discharge to: tbd    The 21st Century Cures Act makes medical notes like these available to patients in the interest of transparency. Please be advised this is a medical document. Medical documents are intended to carry relevant information, facts as evident, and the clinical opinion of the practitioner. The medical note is intended as peer to peer communication and may appear blunt or direct. It is written in medical language and may contain abbreviations or verbiage that are unfamiliar.

## 2025-02-02 ENCOUNTER — APPOINTMENT (OUTPATIENT)
Dept: MRI IMAGING | Facility: HOSPITAL | Age: 55
DRG: 024 | End: 2025-02-02
Attending: NEUROLOGICAL SURGERY
Payer: COMMERCIAL

## 2025-02-02 PROCEDURE — 70553 MRI BRAIN STEM W/O & W/DYE: CPT | Performed by: NEUROLOGICAL SURGERY

## 2025-02-02 PROCEDURE — 99232 SBSQ HOSP IP/OBS MODERATE 35: CPT | Performed by: HOSPITALIST

## 2025-02-02 RX ORDER — GADOTERATE MEGLUMINE 376.9 MG/ML
30 INJECTION INTRAVENOUS
Status: COMPLETED | OUTPATIENT
Start: 2025-02-02 | End: 2025-02-02

## 2025-02-02 RX ORDER — VANCOMYCIN 2 GRAM/500 ML IN 0.9 % SODIUM CHLORIDE INTRAVENOUS
25 ONCE
Status: COMPLETED | OUTPATIENT
Start: 2025-02-02 | End: 2025-02-02

## 2025-02-02 RX ORDER — VANCOMYCIN HYDROCHLORIDE
15 EVERY 12 HOURS
Status: DISCONTINUED | OUTPATIENT
Start: 2025-02-02 | End: 2025-02-03

## 2025-02-02 NOTE — PROGRESS NOTES
[facility] Pharmacy Dosing Service      Initial Pharmacokinetic Consult for Vancomycin Dosing     Devora Hess is a 54 year old male who is being initiated on vancomycin therapy for CNS infection.  Pharmacy has been asked to dose vancomycin by Dr Huff.  The initial treatment and monitoring approach will be steady state AUC strategy.        Weight and Temperature:    Wt Readings from Last 1 Encounters:   25 77.1 kg (169 lb 14.4 oz)        Temp Readings from Last 1 Encounters:   25 97.8 °F (36.6 °C) (Oral)      Labs:   Recent Labs   Lab 25  1409 25  0524 25  0420   CREATSERUM 0.98 0.86 0.73      Estimated Creatinine Clearance: 124.6 mL/min (based on SCr of 0.73 mg/dL).     Recent Labs   Lab 25  1409 25  0524 25  0420   WBC 14.6* 12.5* 13.0*          The Pharmacokinetic Target is:     to 600 mg-h/L and trough <=15 mg/L    Renal Dosing Considerations:    None     Assessment/Plan:   Initial/Loading dose: Will receive 2000 mg IV (25 mg/kg, capped at 2250 mg) x 1 initial dose. (Will reload, originally received 2 gram vancomycin IV on @2100)      Maintenance dose: Pharmacy will dose vancomycin at 1250 mg IV every 12 hours    Monitorin) Plan for vancomycin peak and trough to be obtained in approximately 48 hours    2) Pharmacy will order SCr as clinically indicated to assess renal function.    3) Pharmacy will monitor for toxicity and efficacy, adjust vancomycin dose and/or frequency, and order vancomycin levels as appropriate per the Pharmacy and Therapeutics Committee approved protocol until discontinuation of the medication.       We appreciate the opportunity to assist in the care of this patient.     Maureen Jha PharmD  2025  8:28 AM  [facility]  Pharmacy Extension: [phone]

## 2025-02-02 NOTE — PROGRESS NOTES
INFECTIOUS DISEASE PROGRESS NOTE    Devora Hess Patient Status:  Inpatient    1970 MRN [id number]   Location [facility] 6NE-A Attending Alex Byers MD   Hosp Day # 3 PCP Yesica Schwarz, DO     SUBJECTIVE   Afebrile. Feels better. No new complaints. Wife on the phone    ASSESSMENT/PLAN:    # brain abscess now s/p craniotomy with OR findings for yellow purulence on   Cx with staph aureus  Fungal and AFB cx in process  NSGY following    Plan:  - continue IV Ceftriaxone 2g q12hr. IV vanc added while awaiting susceptibilities. Ok to dc flagyl.   - will follow OR cx results.   - follow wbc, fever curve  - chart, micro, labs and imaging reviewed.     D/w pt  Will follow    History of Present Illness:  Devora Hess is a a(n) 54 year old male emigrated from [place] in .   He has had a worsening headache over the last 2 weeks.  He saw a dentist and underwent xrays as well as a CT scan that he says did not show anything significant that would require dental procedures.  He was told it may be TMJ.  He  went to immediate care on  and given a course of steroids.  He returned on  with ongoing symptoms and found to have abnormal CT showing hypodense mass at the temporal lobe. This was followed by MRI that demonstrated temporal lobe mass with peripheral enhancement and dural thickening.  No fever reported.     OBJECTIVE  BP (!) 139/92 (BP Location: Left arm)   Pulse 89   Temp 97.8 °F (36.6 °C) (Oral)   Resp 11   Ht 5' 11\" (1.803 m)   Wt 169 lb 14.4 oz (77.1 kg)   SpO2 94%   BMI 23.70 kg/m²     General: No acute distress. Sleeping  HEENT: EOMI, craniotomy sutures in place  Abdomen: Soft, nontender, nondistended.   Musculoskeletal: No edema  Integument: No rashes    Labs:     Recent Labs   Lab 25  1409 25  0524 25  0420   WBC 14.6* 12.5* 13.0*   HGB 15.8 14.5 13.3   .0 323.0 266.0       Recent Labs   Lab 25  1409 25  0524 25  0420   * 136 136    K 3.9 4.1 3.7    101 101   CO2 28.0 27.0 25.0   BUN 10 10 10   CREATSERUM 0.98 0.86 0.73       Recent Labs   Lab 01/30/25  1409   ALT 50*   AST 16       Microbiology: Reviewed    Imaging: Reviewed       MD Remigio [facility]  [phone]

## 2025-02-02 NOTE — PROGRESS NOTES
[facility]   part of [facility]     Hospitalist Progress Note     Devora Hess Patient Status:  Inpatient    1970 MRN [id number]   Location [facility] 7NE-A Attending Alex Byers MD   Hosp Day # 3 PCP Yesica Schwarz DO     Chief Complaint: Brain mass    Subjective:     No acute events, BP controlled, only mid incisional pain.    Objective:    Review of Systems:   A comprehensive review of systems was completed; pertinent positive and negatives stated in subjective.    Vital signs:  Temp:  [97.7 °F (36.5 °C)-98.9 °F (37.2 °C)] 97.8 °F (36.6 °C)  Pulse:  [61-82] 73  Resp:  [9-19] 17  BP: (119-146)/(64-89) 142/89  SpO2:  [92 %-97 %] 94 %  AO: (115-143)/(74-87) 128/82    Physical Exam:    General: No acute distress  Respiratory: No wheezes, no rhonchi  Cardiovascular: S1, S2, regular rate and rhythm  Abdomen: Soft, Non-tender, non-distended, positive bowel sounds  Neuro: No new focal deficits.   Extremities: No edema      Diagnostic Data:    Labs:  Recent Labs   Lab 25  1409 25  0524 25  0843 25  0420   WBC 14.6* 12.5*  --  13.0*   HGB 15.8 14.5  --  13.3   MCV 80.3 79.7*  --  78.9*   .0 323.0  --  266.0   INR  --   --  1.10  --        Recent Labs   Lab 25  1409 25  0524 25  0420   * 113* 136*   BUN 10 10 10   CREATSERUM 0.98 0.86 0.73   CA 9.7 8.9 8.1*   ALB 4.7  --   --    * 136 136   K 3.9 4.1 3.7    101 101   CO2 28.0 27.0 25.0   ALKPHO 86  --   --    AST 16  --   --    ALT 50*  --   --    BILT 0.7  --   --    TP 8.1  --   --        Estimated Glomerular Filtration Rate: 108.8 mL/min/1.73m2 (by CKD-EPI based on SCr of 0.73 mg/dL).    No results for input(s): \"TROP\", \"TROPHS\", \"CK\" in the last 168 hours.    Recent Labs   Lab 25  0843   PTP 14.3   INR 1.10                  Microbiology    Hospital Encounter on 25   1. Tissue Aerobic Culture     Status: Abnormal (Preliminary result)    Collection Time: 25  8:50  PM    Specimen: Brain; Tissue   Result Value Ref Range    Tissue Culture Result 4+ growth Staphylococcus aureus (A) N/A    Tissue Smear 4+ WBCs seen (A) N/A    Tissue Smear 3+ Gram positive cocci in pairs and clusters (A) N/A   2. Blood Culture     Status: None (Preliminary result)    Collection Time: 01/30/25  5:12 PM    Specimen: Blood,peripheral   Result Value Ref Range    Blood Culture Result No Growth 2 Days N/A         Imaging: Reviewed in Epic.    Medications:    cefTRIAXone  2 g Intravenous Q12H    metroNIDAZOLE  500 mg Intravenous Q8H    rosuvastatin  20 mg Oral Nightly    [Transfer Hold] lisinopril  10 mg Oral Daily    levETIRAcetam  500 mg Intravenous Q12H       Assessment & Plan:      #Brain mass c/w right frontal abscess s/p resection   #Headaches   CT noted 2.6cm hypodense mass  MRI brain shows similar  S/p right craniotomy with resection   Blood cultures sent to r/o abscess - started rocephin and vanc however procal and wbc wnl  Keppra 500mg IV BID for seizure prophylaxis  Neurosurgery consult, dental xrays negative, CT c/a/p with no metastatic disease   Culture GPC in clusters and pairs, Staph aureus, abx adjusted per ID  ID and neurosurgery following     #Essential HTN  Lisinopril     #HLD  Statin      #Mild hyponatremia   Resolved          Kimberly Gee MD    Supplementary Documentation:     Quality:  DVT Mechanical Prophylaxis: LOUIE hose, SCDs, Early ambuation  DVT Pharmacologic Prophylaxis   Medication   None                Code Status: Full Code  [facility]: No urinary catheter in place  [facility] Duration (in days):   Central line:    WALLACE:     Discharge is dependent on: progress  At this point Mr. Hess is expected to be discharge to: tbd    The 21st Century Cures Act makes medical notes like these available to patients in the interest of transparency. Please be advised this is a medical document. Medical documents are intended to carry relevant information, facts as evident, and the clinical opinion  of the practitioner. The medical note is intended as peer to peer communication and may appear blunt or direct. It is written in medical language and may contain abbreviations or verbiage that are unfamiliar.

## 2025-02-02 NOTE — PROGRESS NOTES
[facility]  Neurosurgery Progress Note    Devora Hess Patient Status:  Inpatient    1970 MRN [id number]   Location [facility] 6NE-A Attending Alex Byers MD   Hosp Day # 3 PCP Yesica Schwarz DO     Chief Complaint:  Cade abscess    POD #2 s/p craniotomy with lesion resection     Subjective:  No events overnight. Culture growing gram positive cocci. On abx. MRI brain pending    Objective/Physical Exam:    Vital Signs:  Blood pressure (!) 139/92, pulse 89, temperature 97.8 °F (36.6 °C), temperature source Oral, resp. rate 11, height 71\", weight 169 lb 14.4 oz (77.1 kg), SpO2 94%.  Respiratory:  nonlabored  CV:  well perfused  General: NAD, Speech Fluent, Mood Appropriate  Neurologic:   A&Ox3  PERRL, EOMi, FS, TM  Full strength x 4, no drift  Skin: Staples MORAIMA, no signs of infection.    Labs:  Recent Labs   Lab 25  1409 25  0420   RBC 5.58 5.18 4.78   HGB 15.8 14.5 13.3   HCT 44.8 41.3 37.7*   MCV 80.3 79.7* 78.9*   MCH 28.3 28.0 27.8   MCHC 35.3 35.1 35.3   RDW 12.3 12.3 12.1   NEPRELIM 12.19* 10.09*  --    WBC 14.6* 12.5* 13.0*   .0 323.0 266.0       Recent Labs   Lab 25  1409 25  0524 25  0420   * 113* 136*   BUN 10 10 10   CREATSERUM 0.98 0.86 0.73   EGFRCR 92 104 109   CA 9.7 8.9 8.1*   * 136 136   K 3.9 4.1 3.7    101 101   CO2 28.0 27.0 25.0       Imaging:  CT BRAIN OR HEAD (CPT=70450)    Result Date: 2025  CONCLUSION:  Interval postsurgical changes status post resection of a mass in the right temporal lobe..    LOCATION:  [place]   Dictated by (CST): Chong Nj MD on 2025 at 0:04 AM     Finalized by (CST): Chong Nj MD on 2025 at 0:06 AM       US INTRAOPERATIVE GUIDANCE (CPT=76998)    Result Date: 2025  CONCLUSION:  Intraoperative sonographic guidance for debridement of a brain abscess.  Please refer to the operative note for further details.   LOCATION:  [facility]    Dictated by (CST):  Chong Nj MD on 1/31/2025 at 9:37 PM     Finalized by (CST): Chong Nj MD on 1/31/2025 at 9:38 PM       CT CHEST+ABDOMEN+PELVIS(ALL CNTRST ONLY)(CPT=71260/55925)    Result Date: 1/31/2025  CONCLUSION:  1. There is no specific evidence of metastatic disease in the chest, abdomen or pelvis. 2. 4 mm right middle lobe lung nodule is of uncertain significance.  This could be followed with serial examinations at an appropriate interval based on patient's known clinical history. 3. Other incidental findings are given above.     LOCATION:  [facility]    Dictated by (CST): Cade Castaneda MD on 1/31/2025 at 11:55 AM     Finalized by (CST): Cade Castaneda MD on 1/31/2025 at 12:00 PM       XR CEPHALOMETRIC XRAY  (CPT=70350)    Result Date: 1/31/2025  PROCEDURE:  XR CEPHALOMETRIC XRAY  (CPT=70350)  TECHNIQUE:    Head pain and right-sided dental pain.  Recent diagnosis intracranial mass involving the right temporal region.  COMPARISON:[facility], MR, MRI BRAIN (W+WO) (CPT=70553), 1/30/2025, 5:14 PM.  INDICATIONS:  Right dental pain in the setting of brain mass  PATIENT STATED HISTORY: (As transcribed by Technologist)  Patient offered no additional information at this time.    FINDINGS:    No focal bony calvarial lesions are identified.  Paranasal sinuses are well pneumatized.  IMPRESSION:  Negative exam.   LOCATION:  [facility]     Dictated by (CST): Hemant Castaneda DO on 1/31/2025 at 9:16 AM     Finalized by (CST): Hemant Castaneda DO on 1/31/2025 at 9:18 AM       MRI BRAIN (W+WO) (CPT=70553)    Result Date: 1/30/2025  CONCLUSION:  The known lesion of the right temporal lobe demonstrates peripheral enhancement with probable adjacent pachymeningeal enhancement and dural thickening.  There is central restricted diffusion.  Differential considerations would include a cerebral abscess or glioblastoma, with other etiologies such as metastasis, lymphoma and tumefactive demyelinating disease possible but felt to be less likely.  Critical  results were discussed with Dr. Farmer by Dr. Correa at approximately 1817 on 1/30/25.  Read back was performed.    LOCATION:  [place]    Dictated by (CST): Garret Correa MD on 1/30/2025 at 6:08 PM     Finalized by (CST): Garret Correa MD on 1/30/2025 at 6:18 PM       CTA BRAIN (CPT=70496)    Result Date: 1/30/2025  CONCLUSION:  1. There is a 2.6 cm hypodense mass within the anterior inferior right temporal lobe.  There is surrounding hypoattenuation within the anterior right temporal lobe which may represent edema.  This mass may represent abscess, primary glial neoplasm, or metastatic disease.  A contrast-enhanced brain MRI is recommended for further assessment. 2. No high-grade stenosis, occlusion, or aneurysm is identified within the major intracranial arterial vasculature.    Critical results were discussed with Dr. Farmer at 1633 hours on 1/30/2025. Critical results were read back.   LOCATION:  [id number]   Dictated by (CST): Stromberg, LeRoy, MD on 1/30/2025 at 4:16 PM     Finalized by (CST): Stromberg, LeRoy, MD on 1/30/2025 at 4:37 PM           Assessment:  55 yo male with brain abscess s/p craniotomy and lesion resection    Plan:  Medical management per Hospitalist  Appreciate ID recommendations continue antibiotics  Keppra x 7days  Pain meds as needed  Therapy as tolerated  DVT prophylaxis SCD  MRI brain w/wo pending    Is this a shared or split note between Advanced Practice Provider and Physician? Yes       ANTONY Baltazar  [facility]  2/2/2025  Spectre [id number]

## 2025-02-02 NOTE — PLAN OF CARE
Assumed care at 1930.  Neuro check Q4 hours. No neuro deficits.  C/o right head incision site pain; Norco given for pain management.  Right head crani incision with staples, open to air, no drainage noted.  Keep -160.  Awaiting for MRI.  Plan of care discussed with pt.    0666 Pt transferred to  7618. All pt's belongings with pt. Pt will notify family of the room change. Report given to CTU7 RN. Pt transferred with stable condition.

## 2025-02-03 ENCOUNTER — APPOINTMENT (OUTPATIENT)
Facility: HOSPITAL | Age: 55
DRG: 024 | End: 2025-02-03
Attending: INTERNAL MEDICINE
Payer: COMMERCIAL

## 2025-02-03 LAB
CREAT BLD-MCNC: 0.81 MG/DL
EGFRCR SERPLBLD CKD-EPI 2021: 105 ML/MIN/1.73M2 (ref 60–?)
M TB IFN-G CD4+ T-CELLS BLD-ACNC: 0.02 IU/ML
M TB TUBERC IFN-G BLD QL: NEGATIVE
M TB TUBERC IGNF/MITOGEN IGNF CONTROL: 2.06 IU/ML
QFT TB1 AG MINUS NIL: -0.01 IU/ML
QFT TB2 AG MINUS NIL: -0.01 IU/ML

## 2025-02-03 PROCEDURE — 99232 SBSQ HOSP IP/OBS MODERATE 35: CPT | Performed by: HOSPITALIST

## 2025-02-03 PROCEDURE — 05HY33Z INSERTION OF INFUSION DEVICE INTO UPPER VEIN, PERCUTANEOUS APPROACH: ICD-10-PCS | Performed by: HOSPITALIST

## 2025-02-03 RX ORDER — HYDROCODONE BITARTRATE AND ACETAMINOPHEN 5; 325 MG/1; MG/1
1-2 TABLET ORAL EVERY 4 HOURS PRN
Qty: 30 TABLET | Refills: 0 | Status: SHIPPED | OUTPATIENT
Start: 2025-02-03

## 2025-02-03 RX ORDER — LEVETIRACETAM 500 MG/1
500 TABLET ORAL 2 TIMES DAILY
Qty: 10 TABLET | Refills: 0 | Status: SHIPPED | OUTPATIENT
Start: 2025-02-03

## 2025-02-03 RX ORDER — LIDOCAINE HYDROCHLORIDE 10 MG/ML
5 INJECTION, SOLUTION EPIDURAL; INFILTRATION; INTRACAUDAL; PERINEURAL
Status: COMPLETED | OUTPATIENT
Start: 2025-02-03 | End: 2025-02-03

## 2025-02-03 NOTE — CM/SW NOTE
FIDELIA consult for outpatient abx at DC. Referrals initiated in referral system, await final orders and line placement.     Options for infusion/IV abx pending frequency of dose.   Home with in office teach and train vs outpatient infusion at MD office or infusion center (if dose is once/day) or home with home health care.     Addendum 12:00- FIDELIA met with pt at bedside, wife on phone. Planning for DC tomorrow, PICC line pending. Will need to organize for home abx. At time of conversation, infusion/HH was not confirmed and referrals pending. Notified after conversation of availability for [facility] for IV abx. At time of referral window closing, [facility] accepted.      Pt aware that HHC does not come same day of DC, will have start of care day after DC. [facility] will do bedside training with pt/wife prior to DC, pt will set himself up once abx/supplies deliver to pt home. If DC tomorrow, [facility] will have start of care on Wednesday.     GENIE Mosqueda

## 2025-02-03 NOTE — PAYOR COMM NOTE
ASKING FOR RECONSIDERATION OF INPT   1/30 THRU 2/1    ADMISSION REVIEW     Payor: [employer] OF STATE [facility]  Subscriber #:  [id number]  Authorization Number: [id number]    Admit date: 1/30/25  Admit time:  7:00 PM       REVIEW DOCUMENTATION:     ED Provider Notes        ED Provider Notes signed by Nikunj Farmer MD at 1/30/2025 10:51 PM       Author: Nikunj Farmer MD Service: Emergency Medicine Author Type: Physician    Filed: 1/30/2025 10:51 PM Date of Service: 1/30/2025  2:19 PM Status: Signed    : Nikunj Farmer MD (Physician)           Patient Seen in: [facility] In [place]      History     Chief Complaint   Patient presents with    Headache     Stated Complaint: headache and vomiting sent from IC    Subjective:   HPI      54-year-old male with past medical history of hypertension presents today for evaluation of a headache.  2 weeks ago, patient had a pain in his right jaw.  He was seen by his dentist and diagnosed with TMJ.  The right-sided TMJ discomfort radiated to his right temporal area.  He was seen at the immediate care on January 24.  He was placed on steroids and his pain resolved.  His last dose was on Tuesday and the following day, patient had recurrence of the discomfort.  He has no temporal pain presently.  He reports the pain is a generalized headache.  There is no neck pain, syncope or thunderclap etiology.  He denies any loss of vision or blurred vision.  There is no shoulder joint pain.  The only new symptom he experienced was some nausea along with vomiting.  He has no numbness, tingling, weakness or any other physical symptoms.  He was seen at the immediate care and advised to come to the ER for evaluation.    Objective:     Past Medical History:    Allergic rhinitis    Essential hypertension    Hemorrhoids    High cholesterol    Hyperlipidemia              Past Surgical History:   Procedure Laterality Date    Colonoscopy                   Social History     Socioeconomic History    Marital status:    Tobacco Use    Smoking status: Never    Smokeless tobacco: Never   Vaping Use    Vaping status: Never Used   Substance and Sexual Activity    Alcohol use: Never    Drug use: Never   Other Topics Concern    Caffeine Concern No    Exercise Yes    Seat Belt Yes    Special Diet No    Stress Concern No    Weight Concern No     Social Drivers of Health     Food Insecurity: No Food Insecurity (1/30/2025)    Food Insecurity     Food Insecurity: Never true   Transportation Needs: No Transportation Needs (1/30/2025)    Transportation Needs     Lack of Transportation: No   Physical Activity: Unknown (7/31/2020)    Received from [place]    Exercise Vital Sign     Days of Exercise per Week: 0 days     Minutes of Exercise per Session: Not asked   Housing Stability: Low Risk  (1/30/2025)    Housing Stability     Housing Instability: No                  Physical Exam     ED Triage Vitals [01/30/25 1230]   /90   Pulse 100   Resp 20   Temp 98.3 °F (36.8 °C)   Temp src Temporal   SpO2 97 %   O2 Device None (Room air)       Current Vitals:   Vital Signs  BP: 139/78  Pulse: 94  Resp: 18  Temp: 100.3 °F (37.9 °C)  Temp src: Axillary  MAP (mmHg): 93    Oxygen Therapy  SpO2: 97 %  O2 Device: None (Room air)  Pulse Oximetry Type: Continuous  Oximetry Probe Site Changed: No  Pulse Ox Probe Location: Left hand        Physical Exam  Vitals and nursing note reviewed.   Constitutional:       Appearance: Normal appearance.   HENT:      Head: Normocephalic.      Comments: No temporal tenderness     Nose: Nose normal.      Mouth/Throat:      Mouth: Mucous membranes are moist.   Eyes:      Extraocular Movements: Extraocular movements intact.   Cardiovascular:      Rate and Rhythm: Normal rate.   Pulmonary:      Effort: Pulmonary effort is normal.   Abdominal:      General: Abdomen is flat.   Musculoskeletal:         General:  Normal range of motion.   Skin:     General: Skin is warm.   Neurological:      General: No focal deficit present.      Mental Status: He is alert.      Cranial Nerves: No cranial nerve deficit.      Sensory: No sensory deficit.      Motor: No weakness.      Coordination: Coordination normal.   Psychiatric:         Mood and Affect: Mood normal.          ED Course     Labs Reviewed   CBC WITH DIFFERENTIAL WITH PLATELET - Abnormal; Notable for the following components:       Result Value    WBC 14.6 (*)     Neutrophil Absolute Prelim 12.19 (*)     Neutrophil Absolute 12.19 (*)     Monocyte Absolute 1.13 (*)     All other components within normal limits   COMP METABOLIC PANEL (14) - Abnormal; Notable for the following components:    Glucose 115 (*)     Sodium 134 (*)     ALT 50 (*)     All other components within normal limits   PROCALCITONIN - Abnormal; Notable for the following components:    Procalcitonin 0.06 (*)     All other components within normal limits   SED RATE, WESTERGREN (AUTOMATED) - Normal   C-REACTIVE PROTEIN - Normal   LACTIC ACID, PLASMA - Normal   BLOOD CULTURE   BLOOD CULTURE            MRI BRAIN (W+WO) (CPT=70553)    Result Date: 1/30/2025  PROCEDURE:  MRI BRAIN (W+WO) (CPT=70553)  COMPARISON:  None.  INDICATIONS:  headache and vomiting sent from IC  TECHNIQUE:  MRI of the brain was performed with multi-planar T1, T2-weighted images with FLAIR sequences and diffusion weighted images without and with infusion.  PATIENT STATED HISTORY:(As transcribed by Technologist)  Patient complains of headaches and nausea for several days and states abnormal CT today   CONTRAST USED:  27 mL of Dotarem  FINDINGS:  INTRACRANIAL:  A ring-enhancing lesion is centered on the right temporal lobe in the area of concern noted on recent CT.  This measures approximately 2.7 x 2.2 x 2.4 cm.  There is restricted diffusion present centrally.  A possible enhancing mural nodule  is present anteriorly measuring approximately 6  mm.  There is possible extension into the adjacent dura with some probable pachymeningeal enhancement as well.  Associated FLAIR signal hyperintensity surrounding this lesion most likely represents edema.  No significant midline shift is present. VENTRICLES/SULCI:   Ventricles and sulci are normal in caliber.  There are no extra-axial fluid collections.  There is no midline shift. SINUSES/ORBITS:       The visualized paranasal sinuses are clear.  The orbits are unremarkable. MASTOIDS:       The mastoids are clear.            CONCLUSION:  The known lesion of the right temporal lobe demonstrates peripheral enhancement with probable adjacent pachymeningeal enhancement and dural thickening.  There is central restricted diffusion.  Differential considerations would include a cerebral abscess or glioblastoma, with other etiologies such as metastasis, lymphoma and tumefactive demyelinating disease possible but felt to be less likely.  Critical results were discussed with Dr. Farmer by Dr. Correa at approximately 1817 on 1/30/25.  Read back was performed.    LOCATION:  [facility]    Dictated by (CST): Garret Correa MD on 1/30/2025 at 6:08 PM     Finalized by (CST): Garret Correa MD on 1/30/2025 at 6:18 PM       CTA BRAIN (CPT=70496)    Result Date: 1/30/2025  PROCEDURE:  CTA BRAIN (CPT=70496)  COMPARISON:  None.  INDICATIONS:  headache and vomiting sent from IC  TECHNIQUE:  Unenhanced followed by contrast enhanced multislice CT angiography of the brain vasculature using non-ionic contrast. Multiplanar 3D reformatted imaging as well as multiplanar MIP images were obtained. Dose reduction techniques were used. Dose information is transmitted to the ACR (American College of Radiology) NRDR (National Radiology Data Registry) which includes the Dose Index Registry.  PATIENT STATED HISTORY:(As transcribed by Technologist)    headache and vomiting   CONTRAST USED:  60cc of Isovue 370  FINDINGS:  No hydrocephalus.  The basal cisterns are  patent.  There is a rounded hypodense mass at the anterior inferior right temporal lobe measuring 2.6 x 1.9 x 2.3 cm.  There is hypoattenuation surrounding this mass within the anterior right temporal lobe.  There is mild mass effect on the surrounding sulci.  There is no acute intracranial hemorrhage or extra-axial fluid collection.   There is no evident fracture.  The visualized paranasal sinuses and mastoid air cells are unremarkable.   There is a nonspecific focus of sclerosis within the right parietal bone.  This could represent a bone island among other etiologies.  The upper cervical, petrous, cavernous, and supraclinoid internal carotid arteries are unremarkable.  An anterior communicating artery is seen.  The branches of the anterior cerebral and middle cerebral arteries are unremarkable.  A small right posterior  communicating artery is seen along with a moderate-sized left posterior communicating artery.  The branches of the posterior cerebral and superior cerebellar arteries are unremarkable.  The basilar artery has a normal course and caliber.  The bilateral vertebral arteries are unremarkable.              CONCLUSION:  1. There is a 2.6 cm hypodense mass within the anterior inferior right temporal lobe.  There is surrounding hypoattenuation within the anterior right temporal lobe which may represent edema.  This mass may represent abscess, primary glial neoplasm, or metastatic disease.  A contrast-enhanced brain MRI is recommended for further assessment. 2. No high-grade stenosis, occlusion, or aneurysm is identified within the major intracranial arterial vasculature.    Critical results were discussed with Dr. Farmer at 1633 hours on 1/30/2025. Critical results were read back.   LOCATION:  [facility]   Dictated by (CST): Stromberg, LeRoy, MD on 1/30/2025 at 4:16 PM     Finalized by (CST): Stromberg, LeRoy, MD on 1/30/2025 at 4:37 PM            [facility]      Differential Diagnosis  54-year-old male  presents today for evaluation of a headache.  He was diagnosed with TMJ 2 weeks ago and initially had some pain that radiate to his right temple area.  That pain resolved. There was no thunderclap etiology, neck pain or syncope.  He presently reports a generalized headache.  He has normal strength in all 4 extremities.  His gross sensation is intact.  His speech is clear and fluid and his cranial nerves appear normal.  He has no ataxia.  There is no meningismus.  On review of the immediate care note from earlier today, there was concern for the possibility of aneurysm, will obtain CTA of the head.  This will also allow for assessment of intracranial mass or hemorrhage.  There is concern for temporal arteritis although patient does not have any joint pain, vision changes or temporal pain/tenderness to make me suspect this strongly at this time.  Will send off inflammatory markers.  Of consideration as well would be tension headache.  Will treat him symptomatically and obtain labs/imaging.    ------  Patient CT was concerning for an intracranial mass.  Differential would include abscess, GBM versus metastatic disease.  I reviewed case with neurosurgery Dr. Baxter.  He recommended infectious workup with blood cultures, empiric Keppra for seizure prophylaxis along with MRI of the brain for better evaluation of this mass.  Patient was updated on this finding and agreeable to admission.  I spoke with the hospitalist in regards admission.    Discussions of Management  Case reviewed with neurosurgery along with the hospitalist    Admission disposition: 1/30/2025  5:06 PM           Medical Decision Making      Disposition and Plan     Clinical Impression:  1. Brain mass         Disposition:  Admit  1/30/2025  5:06 pm    Hospital Problems       Present on Admission  Date Reviewed: 1/24/2025            ICD-10-CM Noted POA    * (Principal) Brain mass G93.89 1/30/2025 Unknown            Signed by Nikunj Farmer MD  on 1/30/2025 10:51 PM         MEDICATIONS ADMINISTERED IN LAST 1 DAY:  cefTRIAXone (Rocephin) 2 g in sodium chloride 0.9% 100 mL IVPB-ADDV       Date Action Dose Route User    2/2/2025 2344 New Bag 2 g Intravenous Yadira, Gerber Rosita    2/2/2025 1405 New Bag 2 g Intravenous Svetlana Kirkpatrick RN          gadoterate meglumine (Dotarem) 7.5 MMOL/15ML injection 30 mL       Date Action Dose Route User    2/2/2025 1740 Given 22 mL Intravenous (Right Lower Arm) Mourtganga, Karlee          levETIRAcetam (Keppra) 500 mg/5mL injection 500 mg       Date Action Dose Route User    2/2/2025 2039 Given 500 mg Intravenous Yadira, Gerber HookerRosita    2/2/2025 0848 Given 500 mg Intravenous Svetlana Kirkpatrick RN          metroNIDAZOLE in sodium chloride 0.79% (Flagyl) 5 mg/mL IVPB premix 500 mg       Date Action Dose Route User    2/2/2025 0848 New Bag 500 mg Intravenous Svetlana Kirkpatrick RN          rosuvastatin (Crestor) tab 20 mg       Date Action Dose Route User    2/2/2025 2039 Given 20 mg Oral Yadira, Gerber HookerRosita          vancomycin (Vancocin) 1.25 g in sodium chloride 0.9% 250mL IVPB premix       Date Action Dose Route User    2/2/2025 2039 New Bag 1,250 mg Intravenous Yadira, Gerber Rosita          vancomycin (Vancocin) 2 g in sodium chloride 0.9% 500mL IVPB premix       Date Action Dose Route User    2/2/2025 1017 New Bag 2,000 mg Intravenous Svetlana Kirkpatrick RN            Vitals (last day)       Date/Time Temp Pulse Resp BP SpO2 Weight O2 Device O2 Flow Rate (L/min) Who    02/03/25 0700 -- -- -- -- -- 168 lb 12.8 oz (76.6 kg) -- -- LM    02/03/25 0606 -- -- -- -- -- 175 lb 6.4 oz (79.6 kg) -- -- TL    02/03/25 0400 97.5 °F (36.4 °C) 76 16 135/92 94 % -- None (Room air) -- TL    02/03/25 0000 97.8 °F (36.6 °C) 78 17 137/96 97 % -- None (Room air) -- SV    02/02/25 2000 98 °F (36.7 °C) 84 18 142/88 93 % -- None (Room air) -- LR    02/02/25 1600 98.6 °F (37 °C) 86 19 141/93 -- -- None (Room air) -- AB    02/02/25 1200 97.4 °F (36.3 °C) 88  18 151/90 94 % -- None (Room air) -- CV    02/02/25 0800 97.8 °F (36.6 °C) 89 11 139/92 94 % -- None (Room air) -- CV    02/02/25 0700 97.8 °F (36.6 °C) 73 17 142/89 94 % -- None (Room air) -- SV    02/02/25 0600 -- 62 15 128/81 94 % -- None (Room air) -- SC    02/02/25 0400 97.9 °F (36.6 °C) 64 16 119/64 94 % 169 lb 14.4 oz (77.1 kg) None (Room air) -- SC    02/02/25 0200 -- 66 15 127/78 92 % -- None (Room air) -- SC    02/02/25 0000 98 °F (36.7 °C) 69 16 120/76 94 % -- None (Room air) -- SC     01/31/25 2330 -- 71 15 130/75 98 % -- Nasal cannula 4 L/min    01/31/25 2315 -- 68 15 135/78 97 % -- Nasal cannula 4 L/min    01/31/25 2300 -- 68 14 118/90 98 % -- Nasal cannula 4 L/min    01/31/25 2252 -- -- -- 128/70 -- -- -- --    01/31/25 2245 -- 72 15 135/93 Abnormal  97 % -- Nasal cannula 4 L/min    01/31/25 2230 98.3 °F (36.8 °C) 78 17 135/71 99 % -- Nasal cannula 4 L/min    01/31/25 2215 -- 76 16 129/72 98 % -- Nasal cannula 4 L/min    01/31/25 2210 -- -- -- 110/68 -- -- -- --    01/31/25 2200 -- 74 18 139/79 98 % -- Nasal cannula 4 L/min    01/31/25 2155 -- 74 13 133/80 100 % -- Nasal cannula 4 L/min    01/31/25 2150 -- 81 18 145/77 98 % -- Nasal cannula 4 L/min    01/31/25 2145 96.7 °F (35.9 °C) 83 16 109/70 97 % -- Simple mask 6 L/min TC   01/31/25 1600 99.4 °F (37.4 °C) 79 19 121/71 97 % -- None (Room air) --    01/31/25 1235 98.7 °F (37.1 °C) 73 18 137/84 95 % -- -- --    01/31/25 0730 99 °F (37.2 °C) 90 22 141/89 96 % -- None (Room air) --    01/31/25 0545 99.5 °F (37.5 °C) 88 15 135/74 93 % -- None (Room air) --    01/31/25 0000 99.1 °F (37.3 °C) 77 18 126/77 95 % -- None (Room air) -- RP   01/30/25 1901 -- -- -- -- -- 177 lb 8 oz (80.5 kg) -- -- AM   01/30/25 1900 100.3 °F (37.9 °C) 94 18 139/78 97 % -- None (Room air) -- RP   01/30/25 1806 100.4 °F (38 °C) 92 15 158/90 -- -- -- -- CB   01/30/25 1641 -- 95 18 145/94 Abnormal  100 % -- None (Room air) -- SCA   01/30/25 1633 98.7  °F (37.1 °C) 96 -- 142/93 Abnormal  100 % -- None (Room air) -- KK   01/30/25 1527 -- 89 18 145/92 Abnormal  99 % -- None (Room air) -- KK   01/30/25 1408 -- 88 18 147/97 Abnormal  100 % -- None (Room air) -- SCA   01/30/25 1230 98.3 °F (36.8 °C) 100 20 155/90 97 % 180 lb (81.6 kg) None (Room air) -- LMA     H&P    Chief Complaint: Headaches, vomiting         Subjective:  History of Present Illness:      Devora Hess is a 54 year old male with a past medical history of hypertension hyperlipidemia who presents to the emergency department with headaches and vomiting.  Patient's been having headache for the last 1 week.  Is different than any headache he is ever had in the past.  He then began having nausea and vomiting today.  He decided to come get evaluated.  Denies any weakness in his arms or legs, no vision changes.  No recent fevers, no congestion.  No other acute complaints at this time.           Recent Labs   Lab 01/30/25  1409   RBC 5.58   HGB 15.8   HCT 44.8   MCV 80.3   MCH 28.3   MCHC 35.3   RDW 12.3   NEPRELIM 12.19*   WBC 14.6*   .0             Recent Labs   Lab 01/30/25  1409   *   BUN 10   CREATSERUM 0.98   EGFRCR 92   CA 9.7   ALB 4.7   *   K 3.9      CO2 28.0   ALKPHO 86   AST 16   ALT 50*   BILT 0.7   TP 8.1       Assessment & Plan:  #Brain mass   #Headaches   CT noted 2.6cm hypodense mass  MRI brain shows similar  Differential includes abscess, glioblastoma, lymphoma, mets  Blood cultures sent to r/o abscess - started rocephin and vanc  Keppra 500mg IV BID for seizure proph  Neurosurgery consult   Consult ID     #Essential HTN  Lisinopril     #HLD  Statin      #Mild hyponatremia   Na 134           Plan of care discussed with patient, er physician, nurse      1/31 HOSPITALIST NOTE       Subjective:  Patient doing well, denies headache, focal weakness, afebrile.    Vital signs:  Temp:  [98 °F (36.7 °C)-100.4 °F (38 °C)] 99 °F (37.2 °C)  Pulse:  [] 90  Resp:  [15-22]  22  BP: (126-158)/(74-97) 141/89  SpO2:  [93 %-100 %] 96 %     Physical Exam:    General: No acute distress  Respiratory: No wheezes, no rhonchi  Cardiovascular: S1, S2, regular rate and rhythm  Abdomen: Soft, Non-tender, non-distended, positive bowel sounds  Neuro: No new focal deficits.   Extremities: No edema        Diagnostic Data:    Labs:        Recent Labs   Lab 01/30/25  1409 01/31/25  0524 01/31/25  0843   WBC 14.6* 12.5*  --    HGB 15.8 14.5  --    MCV 80.3 79.7*  --    .0 323.0  --    INR  --   --  1.10              Recent Labs   Lab 01/30/25  1409 01/31/25  0524   * 113*   BUN 10 10   CREATSERUM 0.98 0.86   CA 9.7 8.9   ALB 4.7  --    * 136   K 3.9 4.1    101   CO2 28.0 27.0   ALKPHO 86  --    AST 16  --    ALT 50*  --    BILT 0.7  --    TP 8.1  --        Medications:   Scheduled Medications    rosuvastatin  20 mg Oral Nightly    lisinopril  10 mg Oral Daily    levETIRAcetam  500 mg Intravenous Q12H    cefTRIAXone  2 g Intravenous Q24H               Assessment & Plan:  #Brain mass   #Headaches   CT noted 2.6cm hypodense mass  MRI brain shows similar  Differential includes abscess, glioblastoma, lymphoma, mets  Blood cultures sent to r/o abscess - started rocephin and vanc  Keppra 500mg IV BID for seizure prophylaxis  Neurosurgery consult, dental xrays negative, CT c/a/p, possible right crani for evacuation of mass, hold AP/AC, post op will need ICU  ID following     #Essential HTN  Lisinopril     #HLD  Statin      #Mild hyponatremia   Na 134     1/31 ID CONSULT NOTE  ested by Dr. Byers     Reason for Consultation:  Possible brain abscess     History of Present Illness:  Devora Hess is a a(n) 54 year old male emigrated from [place] in 1991.   He has had a worsening headache over the last 2 weeks.  He saw a dentist and underwent xrays as well as a CT scan that he says did not show anything significant that would require dental procedures.  He was told it may be TMJ.  He  went to  immediate care on 1/25 and given a course of steroids.  He returned on 1/30 with ongoing symptoms and found to have abnormal CT showing hypodense mass at the temporal lobe. This was followed by MRI that demonstrated temporal lobe mass with peripheral enhancement and dural thickening.  No fever reported.      Neurosurgery is planning craniotomy today.       ASSESSMENT/PLAN:  1. Temporal lobe brain mass with peripheral enhancement and adjacent dural thickening  -presented with worsening headache x 2 weeks  No fever or systemic signs of infection  PCT is negative, normal WBC no left shift     -Neurosurgery planning craniotomy     Differential diagnosis includes malignant and benign tumors as well as infection     -can continue IV ceftriaxone and metronidazole until abscess ruled out  -risk for TB infection, emigrated from [place] 34 years ago  --------can maintain airborne isolation during surgery   --------does not need isolation otherwise since no evidence of pulmonary TB     Further recs depending on operative findings as well as pathology, and micro     1/31 NEUROSURGERY CONSULT NOTE    REASON FOR CONSULTATION:  Right temporal brain mass     HISTORY OF PRESENT ILLNESS:  Devora Hess is a(n) 54 year old male with PMH HTN HLD presenting with right sided jaw pain and headache with associated nausea over the last 2 weeks and vomiting yesterday. Patient was evaluated by his PCP for the jaw pain and it was thought to be secondary to TMJ for which he was prescribed Prednisone. Patient had good relief of his pain with the Prednisone until the dose stopped Tuesday and he had return of severe right jaw pain on Wednesday. He presented to Urgent Care and a CT head was obtained revealing a right temporal mass for which he was recommended to go to ED for further evaluation. Patient denies facial weakness or trouble with speech or chewing. He denies blurred or double vision, no focal weakness on no numbness or tingling in his  face or extremities. He has not received any antibiotics prior to his hospitalization.        PHYSICAL EXAMINATION:  VITAL SIGNS: /74 (BP Location: Left arm)   Pulse 88   Temp 99.5 °F (37.5 °C) (Temporal)   Resp 15   Ht 5' 11\" (1.803 m)   Wt 177 lb 8 oz (80.5 kg)   SpO2 93%   BMI 24.76 kg/m²   GENERAL:  Patient is a 54 year old male in no acute distress.  HEENT:  Normocephalic, atraumatic     NEUROLOGICAL:  This patient is alert and orientated x 3.  Speech fluent. Comprehension intact.   PERRLA +3 brisk,  EOMI.  VFF.  Face is symmetrical. Tongue is midline.  Uvula and palate elevate symmetrically. Shoulder shrug normal bilaterally. Remaining CN's GI.  Sensation to light touch is intact bilaterally.  Motor: No Drift. No arm or leg weakness noted. 5/5 bilaterally. Finger-to-nose coordination is intact.  Gait deferred.        Upper extremity strength:        Deltoid    Triceps     Biceps        Wrist Extension  Finger extension Thumb Abduction  Thumb adduction Intrinsics   Right         5        5         5          5 5 5 5 5 5   Left         5        5         5          5 5 5 5 5 5      Lower extremity strength:      Iliopsoas  Hamstrings   Quads    D-flexion P-flexion Eversion   Right       5         5       5         5 5 5   Left       5         5       5         5 5 5      Reflexes DTRs:      Biceps   Brachioradialis   Triceps    Patellar    Ankle  Hamstring   Right      2+           2+       2+        2+       2+        2+   Left      2+           2+       2+        2+       2+        2+        IMAGING:  MRI BRAIN (W+WO) (CPT=70553)     Result Date: 1/30/2025  CONCLUSION:  The known lesion of the right temporal lobe demonstrates peripheral enhancement with probable adjacent pachymeningeal enhancement and dural thickening.  There is central restricted diffusion.  Differential considerations would include a cerebral abscess or glioblastoma, with other etiologies such as metastasis, lymphoma and  tumefactive demyelinating disease possible but felt to be less likely.  Critical results were discussed with Dr. Farmer by Dr. Correa at approximately 1817 on 1/30/25.  Read back was performed.    LOCATION:  [facility]    Dictated by (CST): Garret Correa MD on 1/30/2025 at 6:08 PM     Finalized by (CST): Garret Correa MD on 1/30/2025 at 6:18 PM        CTA BRAIN (CPT=70496)     Result Date: 1/30/2025  CONCLUSION:  1. There is a 2.6 cm hypodense mass within the anterior inferior right temporal lobe.  There is surrounding hypoattenuation within the anterior right temporal lobe which may represent edema.  This mass may represent abscess, primary glial neoplasm, or metastatic disease.  A contrast-enhanced brain MRI is recommended for further assessment. 2. No high-grade stenosis, occlusion, or aneurysm is identified within the major intracranial arterial vasculature.    Critical results were discussed with Dr. Farmer at 1633 hours on 1/30/2025. Critical results were read back.   LOCATION:  [id number]   Dictated by (CST): Stromberg, LeRoy, MD on 1/30/2025 at 4:16 PM     Finalized by (CST): Stromberg, LeRoy, MD on 1/30/2025 at 4:37 PM          ASSESSMENT:      Patient Active Problem List   Diagnosis    Personal history of colonic polyps    Benign neoplasm of cecum    Diverticulosis of colon    Internal hemorrhoids    Brain mass      Devora Hess is a(n) 54 year old male with PMH HTN HLD presenting with right sided jaw pain and headache with associated nausea over the last 2 weeks and vomiting yesterday found to have right temporal ring-enhancing brain mass concerning for abscess. Patient with elevated WBC on arrival. He was started on empiric Vanc/Ceftriaxone and cultures were sent.        Plan:  -Obtain dental xrays to evaluate for right dental infection  -CT chest/abd/pelvis to rule out metastatic disease  -Keep NPO/IVF now for potential OR today for right crani for evacuation of mass. Will send coags and type and screen  now  -Appreciate ID for abx guidance  -Hold antiplatelets/anticoagulants  -Keppra for seizure ppx  -Patient will need to be admitted to ICU for q1h neurochecks post-op  -Further recs after above obtained and reviewed    Attestation signed by Indra Thakur MD at 2/1/2025  1:36 PM     Patient examined and assessed. Agree with above.      Patient presents with 2 weeks of right sided headache.  This initially improved on steroids but then subsequently recurred following completion.  He presented to the emergency department where head CT demonstrated concern for right temporal lesion.  MRI brain demonstrated a 2.5 cm right anterior temporal rim-enhancing lesion most consistent with a brain abscess.     I had an extensive discussion with the patient, his wife, and his many friends at the bedside regarding the current clinical situation and plan of care.  We discussed the risks, benefits, alternatives, goals, and expectations of a right sided craniotomy for lesion resection both for diagnostic and therapeutic purposes.  After this discussion and answering their questions, expressed understanding and agreement with the plan.     Case discussed with infectious disease    1/31 OP NOTE  DATE OF OPERATION: 1/31/2025    PREOPERATIVE DIAGNOSIS:  Right anterior temporal lesion    POSTOPERATIVE DIAGNOSES:  Right anterior temporal abscess    PROCEDURE PERFORMED:  1. Right sided craniotomy for evacuation of right anterior temporal abscess  2. Neuronavigation  3. Intraoperative ultrasound    2/1 HOSPITALIST NOTE    Subjective:  Patient had craniotomy with brain mass resection yesterday, transferred to ICU thereafter, on cardene gtt, pain controlled, moving all extremities.        Objective:  Review of Systems:   A comprehensive review of systems was completed; pertinent positive and negatives stated in subjective.     Vital signs:  Temp:  [96.7 °F (35.9 °C)-99.4 °F (37.4 °C)] 98.2 °F (36.8 °C)  Pulse:  [67-89] 84  Resp:  [13-19]  13  BP: (109-145)/(64-93) 125/76  SpO2:  [94 %-100 %] 94 %  AO: (128-171)/(54-77) 128/65     Physical Exam:    General: No acute distress  Respiratory: No wheezes, no rhonchi  Cardiovascular: S1, S2, regular rate and rhythm  Abdomen: Soft, Non-tender, non-distended, positive bowel sounds  Neuro: No new focal deficits.   Extremities: No edema        Diagnostic Data:    Labs:         Recent Labs   Lab 01/30/25  1409 01/31/25  0524 01/31/25  0843 02/01/25  0420   WBC 14.6* 12.5*  --  13.0*   HGB 15.8 14.5  --  13.3   MCV 80.3 79.7*  --  78.9*   .0 323.0  --  266.0   INR  --   --  1.10  --                Recent Labs   Lab 01/30/25  1409 01/31/25  0524 02/01/25  0420   * 113* 136*   BUN 10 10 10   CREATSERUM 0.98 0.86 0.73   CA 9.7 8.9 8.1*   ALB 4.7  --   --    * 136 136   K 3.9 4.1 3.7    101 101   CO2 28.0 27.0 25.0   ALKPHO 86  --   --    AST 16  --   --    ALT 50*  --   --    BILT 0.7  --   --    TP 8.1  --   --        Medications:   Scheduled Medications    cefTRIAXone  2 g Intravenous Q12H    metroNIDAZOLE  500 mg Intravenous Q8H    rosuvastatin  20 mg Oral Nightly    [Transfer Hold] lisinopril  10 mg Oral Daily    levETIRAcetam  500 mg Intravenous Q12H               Assessment & Plan:  #Brain mass c/w right frontal abscess  #Headaches   CT noted 2.6cm hypodense mass  MRI brain shows similar  S/p right craniotomy with resection   Blood cultures sent to r/o abscess - started rocephin and vanc however procal and wbc wnl  Keppra 500mg IV BID for seizure prophylaxis  Neurosurgery consult, dental xrays negative, CT c/a/p with no metastatic disease   ID and neurosurgery following     #Essential HTN  Lisinopril     #HLD  Statin      #Mild hyponatremia   Resolved       2/1 NEUROSURGERY NOTE    Chief Complaint:  Cade abscess     POD #1 s/p craniotomy with lesion resection      Subjective:  No events overnight. Post op CT head stable.     Objective/Physical Exam:     Vital Signs:  Blood pressure  126/81, pulse 66, temperature 97.7 °F (36.5 °C), temperature source Temporal, resp. rate 13, height 71\", weight 177 lb 8 oz (80.5 kg), SpO2 96%.  Respiratory:  nonlabored  CV:  well perfused  General: NAD, Speech Fluent, Mood Appropriate  Neurologic:   A&Ox3  PERRL, EOMi, FS, TM  Full strength x 4, no drift  Skin: Dressings removed. Staples MORAIMA.         Imaging:  CT BRAIN OR HEAD (CPT=70450)     Result Date: 2/1/2025  CONCLUSION:  Interval postsurgical changes status post resection of a mass in the right temporal lobe..    LOCATION:  [facility]   Dictated by (CST): Chong Nj MD on 2/01/2025 at 0:04 AM     Finalized by (CST): Chong Nj MD on 2/01/2025 at 0:06 AM         Assessment:  55 yo male with brain abscess s/p craniotomy and lesion resection     Plan:  Medical management per NCC/Hospitalist  Neuro checks q2, if stable ok to transfer to floor in the afternoon and change neuro checks to q4  Appreciate ID recommendations continue antibiotics  Keppra x days  Pain meds as needed  Therapy as tolerated  DVT prophylaxis SCD  MRI brain w/wo     Is this a shared or split note between Advanced Practice Provider and Physician? Yes     Attestation with edits by Indra Thakur MD at 2/1/2025  1:34 PM     Patient examined and assessed. Agree with above.      Status post abscess evacuation yesterday.  Doing very well this morning.  Head CT reviewed.  Cultures pending.     I spoke with the patient and his wife and their friend regarding the current clinical situation and plan of care.  After this discussion and answering their questions, they expressed understanding and appreciation.     Will obtain MRI brain with and without contrast to have a postoperative baseline  Antibiotics per infectious disease  Keppra x 7 days  Okay for transfer to floor this evening pending clinical stability    2/1 ID NOT  ASSESSMENT/PLAN:     # brain abscess now s/p craniotomy with OR findings for yellow purulence on 0131/25  Cx with GPC in  pairs and clusters - likely strep sp  Fungal and AFB cx in process  NSGY following     Plan:  -  continue IV Ceftriaxone 2g q12hr + flagyl for now.  - will follow OR cx results.   - follow wbc, fever curve  - chart, micro, labs and imaging reviewed.      Will follow

## 2025-02-03 NOTE — PLAN OF CARE
Assumed care at 0730  A&Ox4, VSS, up adlib   No complaints of pain   Tolerating diet   No change in neuro status   MRI complete   R head staples MORAIMA   Patient and family updated on POC   All questions answered   Call light within reach

## 2025-02-03 NOTE — PLAN OF CARE
Assumed pt care at 1930  A&Ox4, neuro checks Q4hrs- unchanged  IV abx given as ordered  NSR on tele  Sz prec  Call light in reach  Bed in low position

## 2025-02-03 NOTE — PROGRESS NOTES
[facility]   Neurosurgery Progress Note    Devora Hess Patient Status:  Inpatient    1970 MRN [id number]   Location [facility] 7NE-A Attending Alex Byers MD   Hosp Day # 4 PCP Yesica Schwarz DO     Chief Complaint:  Brain abscess   POD #3 craniotomy with lesion resection     Subjective:  No acute overnight events. Pt examined, reports feeling well this morning. Denies headache, dizziness, extremity weakness, or other neurologic complaints.     Objective/Physical Exam:    Vital Signs:  Blood pressure 130/78, pulse 76, temperature 97.6 °F (36.4 °C), temperature source Oral, resp. rate 15, height 71\", weight 168 lb 12.8 oz (76.6 kg), SpO2 97%.    Respiratory:  Respirations nonlabored    CV:  NSR on tele    General: NAD, Speech Fluent, Mood Appropriate    Neurologic: This patient is awake and alert, answers all questions appropriately. Able to follow commands.  Face is symmetric. PERRLA +3 brisk, EOMI.  CN 2-12 GI,  Sensation to light touch is intact bilaterally.  No Pronator Drift.      Upper extremity strength:      Deltoid  Biceps  Triceps        Right 5 5 5 5     Left 5 5 5 5      Lower extremity strength:      Iliospoas  Hamstrings  Quads  D-flexion  P-flexion     Right 5 5 5 5 5     Left 5 5 5 5 5          Skin: Cranial incision well approximated with staples, no erythema, edema or drainage. There is posterior auricular erythema/ecchymosis which is non tender to palpation.       Labs:  Lab Results   Component Value Date    CREATSERUM 0.81 2025       Imaging:  MRI BRAIN (W+WO) (CPT=70553)    Result Date: 2025  CONCLUSION:  1. Resection cavity in the right temporal lobe with residual enhancement along the medial and inferior aspect of the resection cavity.  This may represent postsurgical change.  Clinical correlation is advised.   LOCATION:  [place]    Dictated by (CST): Chong Nj MD on 2025 at 6:12 PM     Finalized by (CST): Chong Nj MD on 2025  at 6:16 PM            Assessment:  55 y/o male with brain abscess who is POD #3 craniotomy for resection of lesion. Cultures growing staph aureus.     Plan:  Continue antibiotics per ID. PICC line per ID.   Keppra 500mg BID  x 7 days post op   Continue pain control   PT eval, encourage OOB and ambulation as tolerated   DVT prophylaxis: SCD's   Follow up in 2 weeks for wound check with ANTONY Baltazar as scheduled on 2/14  [facility] at this time.     Patient seen and examined, spoke with patient's wife via phone. Will discuss plan of care with Dr. Thakur.       Hoa English PA-C   [facility]  2/3/2025, 12:55 PM      A total of 15 minutes of visit time (exclusible of billable procedures) was administered.  > 50 % of time spent counseling/coordinating care     Is this a shared or split note between Advanced Practice Provider and Physician? Yes    Dragon speech recognition software was used to prepare this note. If a word or phrase is confusing, it is likely due to a failure of recognition. Please contact me with any questions or clarifications.

## 2025-02-03 NOTE — PROGRESS NOTES
[facility]   part of [facility]     Hospitalist Progress Note     Devora Hess Patient Status:  Inpatient    1970 MRN [id number]   Location [facility] 7NE-A Attending Alex Byers MD   Hosp Day # 4 PCP Yesica Schwarz DO     Chief Complaint: Brain mass    Subjective:     No acute events, pain controlled.    Objective:    Review of Systems:   A comprehensive review of systems was completed; pertinent positive and negatives stated in subjective.    Vital signs:  Temp:  [97.4 °F (36.3 °C)-98.6 °F (37 °C)] 98.1 °F (36.7 °C)  Pulse:  [76-88] 86  Resp:  [11-19] 11  BP: (125-151)/(82-96) 125/82  SpO2:  [93 %-97 %] 97 %    Physical Exam:    General: No acute distress  Respiratory: No wheezes, no rhonchi  Cardiovascular: S1, S2, regular rate and rhythm  Abdomen: Soft, Non-tender, non-distended, positive bowel sounds  Neuro: No new focal deficits.   Extremities: No edema      Diagnostic Data:    Labs:  Recent Labs   Lab 25  1409 25  0524 25  0843 25  0420   WBC 14.6* 12.5*  --  13.0*   HGB 15.8 14.5  --  13.3   MCV 80.3 79.7*  --  78.9*   .0 323.0  --  266.0   INR  --   --  1.10  --        Recent Labs   Lab 25  1409 25  0524 25  0420 25  0604   * 113* 136*  --    BUN 10 10 10  --    CREATSERUM 0.98 0.86 0.73 0.81   CA 9.7 8.9 8.1*  --    ALB 4.7  --   --   --    * 136 136  --    K 3.9 4.1 3.7  --     101 101  --    CO2 28.0 27.0 25.0  --    ALKPHO 86  --   --   --    AST 16  --   --   --    ALT 50*  --   --   --    BILT 0.7  --   --   --    TP 8.1  --   --   --        Estimated Glomerular Filtration Rate: 105.4 mL/min/1.73m2 (by CKD-EPI based on SCr of 0.81 mg/dL).    No results for input(s): \"TROP\", \"TROPHS\", \"CK\" in the last 168 hours.    Recent Labs   Lab 25  0843   PTP 14.3   INR 1.10                  Microbiology    Hospital Encounter on 25   1. Tissue Aerobic Culture     Status: Abnormal    Collection Time:  01/31/25  8:50 PM    Specimen: Brain; Tissue   Result Value Ref Range    Tissue Culture Result 4+ growth Staphylococcus aureus (A) N/A    Tissue Smear 4+ WBCs seen (A) N/A    Tissue Smear 3+ Gram positive cocci in pairs and clusters (A) N/A       Susceptibility    Staphylococcus aureus -  (no method available)     Cefazolin  Sensitive      Clindamycin <=0.25 Sensitive      Erythromycin <=0.25 Sensitive      Gentamicin <=0.5 Sensitive      Levofloxacin 4 Resistant      Oxacillin 0.5 Sensitive      Trimethoprim/Sulfa <=10 Sensitive      Vancomycin 1 Sensitive    2. Anaerobic Culture     Status: None (Preliminary result)    Collection Time: 01/31/25  8:50 PM    Specimen: Brain; Tissue   Result Value Ref Range    Anaerobic Culture Pending N/A   3. Blood Culture     Status: None (Preliminary result)    Collection Time: 01/30/25  5:12 PM    Specimen: Blood,peripheral   Result Value Ref Range    Blood Culture Result No Growth 3 Days N/A         Imaging: Reviewed in Epic.    Medications:    nafcillin  2 g Intravenous Q4H    rosuvastatin  20 mg Oral Nightly    [Transfer Hold] lisinopril  10 mg Oral Daily    levETIRAcetam  500 mg Intravenous Q12H       Assessment & Plan:      #Brain mass c/w right frontal abscess s/p resection   #Headaches   CT noted 2.6cm hypodense mass  MRI brain shows similar  S/p right craniotomy with resection   Blood cultures sent to r/o abscess - started rocephin and vanc however procal and wbc wnl  Keppra 500mg IV BID for seizure prophylaxis  Neurosurgery consult, dental xrays negative, CT c/a/p with no metastatic disease   Culture GPC in clusters and pairs, MSSA, abx per ID  ID and neurosurgery following     #Essential HTN  Lisinopril     #HLD  Statin      #Mild hyponatremia   Resolved     #Dispo, dc planning per ID         Kimberly Gee MD    Supplementary Documentation:     Quality:  DVT Mechanical Prophylaxis: LOUIE hose, SCDs, Early ambuation  DVT Pharmacologic Prophylaxis   Medication   None                 Code Status: Full Code  [facility]: No urinary catheter in place  [facility] Duration (in days):   Central line:    [facility]: 2/3/2025    Discharge is dependent on: progress  At this point Mr. Hess is expected to be discharge to: tbd    The 21st Century Cures Act makes medical notes like these available to patients in the interest of transparency. Please be advised this is a medical document. Medical documents are intended to carry relevant information, facts as evident, and the clinical opinion of the practitioner. The medical note is intended as peer to peer communication and may appear blunt or direct. It is written in medical language and may contain abbreviations or verbiage that are unfamiliar.

## 2025-02-03 NOTE — PROGRESS NOTES
INFECTIOUS DISEASE  PROGRESS NOTE            [facility]    Devora Hess Patient Status:  Inpatient    1970 MRN [id number]   [facility] 7NE-A Attending Alex Byers MD   Hosp Day # 4 PCP Yesica Schwarz DO     Antibiotics: Ceftriaxone #3-dc  Vanc #1-dc  Nafcillin #0    Subjective:  : comfortable    Objective:  Temp:  [97.4 °F (36.3 °C)-98.6 °F (37 °C)] 98.1 °F (36.7 °C)  Pulse:  [76-88] 86  Resp:  [11-19] 11  BP: (125-151)/(82-96) 125/82  SpO2:  [93 %-97 %] 97 %    General: awake alert  Vital signs:Temp:  [97.4 °F (36.3 °C)-98.6 °F (37 °C)] 98.1 °F (36.7 °C)  Pulse:  [76-88] 86  Resp:  [11-19] 11  BP: (125-151)/(82-96) 125/82  SpO2:  [93 %-97 %] 97 %  HEENT: right craniotomy wound closed staple line intact  Respiratory: Non labored, symmetric excursion, normal respirations  Cardiovascular: no irregularities in rhythm  Abdomen: Soft, nontender, nondistended.   Musculoskeletal: joints: no swelling   Integument: No lesions. No erythema. No open wounds.  Labs:     COVID-19 Lab Results    COVID-19  No results found for: \"COVID19\"    Pro-Calcitonin  Recent Labs   Lab 25  1409   PCT 0.06*       Cardiac  No results for input(s): \"TROP\", \"PBNP\" in the last 168 hours.    Creatinine Kinase  No results for input(s): \"CK\" in the last 168 hours.    Inflammatory Markers  Recent Labs   Lab 25  1409   CRP 0.40       Recent Labs   Lab 25  0524 25  0420   RBC 5.18 4.78   HGB 14.5 13.3   HCT 41.3 37.7*   MCV 79.7* 78.9*   MCH 28.0 27.8   MCHC 35.1 35.3   RDW 12.3 12.1   NEPRELIM 10.09*  --    WBC 12.5* 13.0*   .0 266.0         Recent Labs   Lab 25  1409 25  0524 25  0420 25  0604   * 113* 136*  --    BUN 10 10 10  --    CREATSERUM 0.98 0.86 0.73 0.81   CA 9.7 8.9 8.1*  --    ALB 4.7  --   --   --    * 136 136  --    K 3.9 4.1 3.7  --     101 101  --    CO2 28.0 27.0 25.0  --    ALKPHO 86  --   --   --    AST 16  --   --   --    ALT  50*  --   --   --    BILT 0.7  --   --   --    TP 8.1  --   --   --        No results found for: \"VANCT\"  Microbiology    Hospital Encounter on 01/30/25   1. Tissue Aerobic Culture     Status: Abnormal    Collection Time: 01/31/25  8:50 PM    Specimen: Brain; Tissue   Result Value Ref Range    Tissue Culture Result 4+ growth Staphylococcus aureus (A) N/A    Tissue Smear 4+ WBCs seen (A) N/A    Tissue Smear 3+ Gram positive cocci in pairs and clusters (A) N/A       Susceptibility    Staphylococcus aureus -  (no method available)     Cefazolin  Sensitive      Clindamycin <=0.25 Sensitive      Erythromycin <=0.25 Sensitive      Gentamicin <=0.5 Sensitive      Levofloxacin 4 Resistant      Oxacillin 0.5 Sensitive      Trimethoprim/Sulfa <=10 Sensitive      Vancomycin 1 Sensitive    2. Anaerobic Culture     Status: None (Preliminary result)    Collection Time: 01/31/25  8:50 PM    Specimen: Brain; Tissue   Result Value Ref Range    Anaerobic Culture Pending N/A   3. Blood Culture     Status: None (Preliminary result)    Collection Time: 01/30/25  5:12 PM    Specimen: Blood,peripheral   Result Value Ref Range    Blood Culture Result No Growth 3 Days N/A           Problem list reviewed:  Patient Active Problem List   Diagnosis    Personal history of colonic polyps    Benign neoplasm of cecum    Diverticulosis of colon    Internal hemorrhoids    Brain mass             ASSESSMENT/PLAN:  1. Temporal lobe brain abscess due to Staph aureus (MSSA)  Self reported history of recurrent boils  Presented with worsening jaw pain and headache in the preceding 2 weeks    -blood cultures no growth and no signs of systemic infection or sepsis  -SP craniotomy and evacuation of right sided temporal lobe abscess on 1/31  -Staph aureus MSSA isolated from brain abscess    Can discontinue vancomycin  Replace ceftriaxone with Nafcillin 2g IV q4h  PICC line today  DC planning on Nafcillin IV on a continuous pump for discharge  Serial MRI prior to  completion of ivabx      Phan Huff MD, MD [facility]  [phone]

## 2025-02-04 VITALS
HEIGHT: 71 IN | BODY MASS INDEX: 23.4 KG/M2 | RESPIRATION RATE: 15 BRPM | TEMPERATURE: 98 F | OXYGEN SATURATION: 97 % | HEART RATE: 68 BPM | WEIGHT: 167.13 LBS | DIASTOLIC BLOOD PRESSURE: 91 MMHG | SYSTOLIC BLOOD PRESSURE: 133 MMHG

## 2025-02-04 LAB
CREAT BLD-MCNC: 0.93 MG/DL
EGFRCR SERPLBLD CKD-EPI 2021: 98 ML/MIN/1.73M2 (ref 60–?)
IGG SUBCLASS 1: 653 MG/DL
IGG SUBCLASS 2: 253 MG/DL
IGG SUBCLASS 3: 39 MG/DL
IGG SUBCLASS 4: 94 MG/DL

## 2025-02-04 PROCEDURE — 99239 HOSP IP/OBS DSCHRG MGMT >30: CPT | Performed by: HOSPITALIST

## 2025-02-04 NOTE — PLAN OF CARE
Assumed care at 0730  A&Ox4, VSS, up adlib   No complaints of pain   No change in neuro status   PICC placed   R head staples intact   Patient and family updated on POC   All questions answered   Call light within reach

## 2025-02-04 NOTE — PLAN OF CARE
Assumed pt care at 1930  A&Ox4, neuro unchanged  NSR on tele  Remained on RA  IV abx given as ordered  Denies pain  Sx incision on R head intact  Poss Dc today  Call light in reach  Bed in low position

## 2025-02-04 NOTE — PLAN OF CARE
Assumed care at 0700  Pt AxOx4, RA  Denies pain  Neuro assessment unchanged; see flowsheets  Teaching completed by [facility] at bedside. Supplies delivered to bedside.  Cleared for discharge by all consults.  Pt and wife updated on POC

## 2025-02-04 NOTE — CM/SW NOTE
SW uploaded PICC line info to [facility]. [facility] to coordinate delivery to hospital room today along with bedside training. [facility]  will see pt tomorrow for start of care.     GENIE Mosqueda

## 2025-02-04 NOTE — PROGRESS NOTES
[facility]   Neurosurgery Progress Note    Devora Hess Patient Status:  Inpatient    1970 MRN [id number]   Location [facility] 7NE-A Attending Alex Byers MD   Hosp Day # 5 PCP Yesica Schwarz DO     Chief Complaint:  Brain abscess   POD #4 craniotomy with lesion resection     Subjective:  No acute overnight events. Has PICC line for long term abx.     Objective/Physical Exam:    Vital Signs:  Blood pressure 133/88, pulse 79, temperature 97.9 °F (36.6 °C), temperature source Oral, resp. rate 19, height 71\", weight 167 lb 1.6 oz (75.8 kg), SpO2 94%.    Respiratory:  Respirations nonlabored    CV:  NSR on tele    General: NAD, Speech Fluent, Mood Appropriate    Neurologic: This patient is awake and alert, answers all questions appropriately. Able to follow commands.  Face is symmetric. PERRLA +3 brisk, EOMI.  CN 2-12 GI,  Sensation to light touch is intact bilaterally.  No Pronator Drift.        Skin: Cranial incision well approximated with staples, no erythema, edema or drainage. There is posterior auricular erythema/ecchymosis which is non tender to palpation.       Labs:  Lab Results   Component Value Date    CREATSERUM 0.93 2025       Imaging:  MRI BRAIN (W+WO) (CPT=70553)    Result Date: 2025  CONCLUSION:  1. Resection cavity in the right temporal lobe with residual enhancement along the medial and inferior aspect of the resection cavity.  This may represent postsurgical change.  Clinical correlation is advised.   LOCATION:  [place]    Dictated by (CST): Chong Nj MD on 2025 at 6:12 PM     Finalized by (CST): Chong Nj MD on 2025 at 6:16 PM            Assessment:  55 y/o male with brain abscess who is POD #4 craniotomy for resection of lesion. Cultures growing staph aureus.     Plan:  Continue antibiotics per ID. PICC line per ID.   Keppra 500mg BID  x 7 days post op   Continue pain control   PT eval, encourage OOB and ambulation as tolerated   DVT  prophylaxis: SCD's   Follow up in 2 weeks for wound check with ANTONY Baltazar as scheduled on 2/14  OK to DC from a Neurosurgical standpoint    Patient seen and examined, spoke with patient's wife via phone. Dr Thakur was present for the visit.      ANTONY Baltazar  [facility]  2/4/2025    A total of 15 minutes of visit time (exclusible of billable procedures) was administered.  > 50 % of time spent counseling/coordinating care     Is this a shared or split note between Advanced Practice Provider and Physician? Yes

## 2025-02-04 NOTE — PLAN OF CARE
NURSING DISCHARGE NOTE    Discharged Home via Wheelchair.  Accompanied by Support staff  Belongings Taken by patient/family.    Cleared for discharge by all consults. Discharge AVS completed and reviewed with patient and wife. Discussed discharge medications, including purpose, dose, and side effects. Meds delivered to patient by meds to beds yesterday. Reviewed follow-ups and the importance of maintaining those appointments. Discussed discharge instructions/precautions and when to notify MD vs seek emergency medical care. All questions and concerns addressed appropriately. Pt demonstrates adequate understanding of all instructions.      Problem: NEUROLOGICAL - ADULT  Goal: Achieves stable or improved neurological status  Description: INTERVENTIONS  - Assess for and report changes in neurological status  - Initiate measures to prevent increased intracranial pressure  - Maintain blood pressure and fluid volume within ordered parameters to optimize cerebral perfusion and minimize risk of hemorrhage  - Monitor temperature, glucose, and sodium. Initiate appropriate interventions as ordered  Outcome: Adequate for Discharge     Problem: PAIN - ADULT  Goal: Verbalizes/displays adequate comfort level or patient's stated pain goal  Description: INTERVENTIONS:  - Encourage pt to monitor pain and request assistance  - Assess pain using appropriate pain scale  - Administer analgesics based on type and severity of pain and evaluate response  - Implement non-pharmacological measures as appropriate and evaluate response  - Consider cultural and social influences on pain and pain management  - Manage/alleviate anxiety  - Utilize distraction and/or relaxation techniques  - Monitor for opioid side effects  - Notify MD/LIP if interventions unsuccessful or patient reports new pain  - Anticipate increased pain with activity and pre-medicate as appropriate  Outcome: Adequate for Discharge     Problem: RISK FOR INFECTION -  ADULT  Goal: Absence of fever/infection during anticipated neutropenic period  Description: INTERVENTIONS  - Monitor WBC  - Administer growth factors as ordered  - Implement neutropenic guidelines  Outcome: Adequate for Discharge

## 2025-02-04 NOTE — PROGRESS NOTES
INFECTIOUS DISEASE  PROGRESS NOTE            [facility]    Devora Hess Patient Status:  Inpatient    1970 MRN [id number]   [facility] 7NE-A Attending Alex Byers MD   Hosp Day # 5 PCP Yesica Schwarz DO     Antibiotics: #1  Nafcillin #1    Subjective:  No fever    Objective:  Temp:  [97.9 °F (36.6 °C)-98.4 °F (36.9 °C)] 98 °F (36.7 °C)  Pulse:  [67-91] 68  Resp:  [15-20] 15  BP: (129-135)/(85-95) 133/91  SpO2:  [94 %-97 %] 97 %    General: awake alert  Vital signs:Temp:  [97.9 °F (36.6 °C)-98.4 °F (36.9 °C)] 98 °F (36.7 °C)  Pulse:  [67-91] 68  Resp:  [15-20] 15  BP: (129-135)/(85-95) 133/91  SpO2:  [94 %-97 %] 97 %  HEENT: right craniotomy wound closed staple line intact  Respiratory: Non labored, symmetric excursion, normal respirations  Cardiovascular: no irregularities in rhythm  Abdomen: Soft, nontender, nondistended.   Musculoskeletal: joints: no swelling   Integument: No lesions. No erythema. No open wounds.  Labs:     COVID-19 Lab Results    COVID-19  No results found for: \"COVID19\"    Pro-Calcitonin  Recent Labs   Lab 25  1409   PCT 0.06*       Cardiac  No results for input(s): \"TROP\", \"PBNP\" in the last 168 hours.    Creatinine Kinase  No results for input(s): \"CK\" in the last 168 hours.    Inflammatory Markers  Recent Labs   Lab 25  1409   CRP 0.40       Recent Labs   Lab 25  0524 25  0420   RBC 5.18 4.78   HGB 14.5 13.3   HCT 41.3 37.7*   MCV 79.7* 78.9*   MCH 28.0 27.8   MCHC 35.1 35.3   RDW 12.3 12.1   NEPRELIM 10.09*  --    WBC 12.5* 13.0*   .0 266.0         Recent Labs   Lab 25  1409 25  0524 25  0420 25  0604 25  0517   * 113* 136*  --   --    BUN 10 10 10  --   --    CREATSERUM 0.98 0.86 0.73 0.81 0.93   CA 9.7 8.9 8.1*  --   --    ALB 4.7  --   --   --   --    * 136 136  --   --    K 3.9 4.1 3.7  --   --     101 101  --   --    CO2 28.0 27.0 25.0  --   --    ALKPHO 86  --   --   --    --    AST 16  --   --   --   --    ALT 50*  --   --   --   --    BILT 0.7  --   --   --   --    TP 8.1  --   --   --   --        No results found for: \"VANCT\"  Microbiology    Hospital Encounter on 01/30/25   1. Tissue Aerobic Culture     Status: Abnormal    Collection Time: 01/31/25  8:50 PM    Specimen: Brain; Tissue   Result Value Ref Range    Tissue Culture Result 4+ growth Staphylococcus aureus (A) N/A    Tissue Smear 4+ WBCs seen (A) N/A    Tissue Smear 3+ Gram positive cocci in pairs and clusters (A) N/A       Susceptibility    Staphylococcus aureus -  (no method available)     Cefazolin  Sensitive      Clindamycin <=0.25 Sensitive      Erythromycin <=0.25 Sensitive      Gentamicin <=0.5 Sensitive      Levofloxacin 4 Resistant      Oxacillin 0.5 Sensitive      Trimethoprim/Sulfa <=10 Sensitive      Vancomycin 1 Sensitive    2. Anaerobic Culture     Status: None (Preliminary result)    Collection Time: 01/31/25  8:50 PM    Specimen: Brain; Tissue   Result Value Ref Range    Anaerobic Culture No Anaerobes to date N/A   3. Blood Culture     Status: None (Preliminary result)    Collection Time: 01/30/25  5:12 PM    Specimen: Blood,peripheral   Result Value Ref Range    Blood Culture Result No Growth 4 Days N/A           Problem list reviewed:  Patient Active Problem List   Diagnosis    Personal history of colonic polyps    Benign neoplasm of cecum    Diverticulosis of colon    Internal hemorrhoids    Brain mass             ASSESSMENT/PLAN:  1. 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  [facility]  [phone]

## 2025-02-04 NOTE — PROGRESS NOTES
[facility]   part of [facility]     Hospitalist Progress Note     Devora Hess Patient Status:  Inpatient    1970 MRN [id number]   Location [facility] 7NE-A Attending Alex Byers MD   Hosp Day # 5 PCP Yesica Schwarz DO     Chief Complaint: Brain mass    Subjective:     No acute events, pain controlled.    Objective:    Review of Systems:   A comprehensive review of systems was completed; pertinent positive and negatives stated in subjective.    Vital signs:  Temp:  [97.6 °F (36.4 °C)-98.4 °F (36.9 °C)] 97.9 °F (36.6 °C)  Pulse:  [67-91] 79  Resp:  [15-20] 19  BP: (129-135)/(78-95) 133/88  SpO2:  [94 %-97 %] 94 %    Physical Exam:    General: No acute distress  Respiratory: No wheezes, no rhonchi  Cardiovascular: S1, S2, regular rate and rhythm  Abdomen: Soft, Non-tender, non-distended, positive bowel sounds  Neuro: No new focal deficits.   Extremities: No edema      Diagnostic Data:    Labs:  Recent Labs   Lab 25  1409 25  0524 25  0843 25  0420   WBC 14.6* 12.5*  --  13.0*   HGB 15.8 14.5  --  13.3   MCV 80.3 79.7*  --  78.9*   .0 323.0  --  266.0   INR  --   --  1.10  --        Recent Labs   Lab 25  1409 25  0524 25  0420 25  0604 25  0517   * 113* 136*  --   --    BUN 10 10 10  --   --    CREATSERUM 0.98 0.86 0.73 0.81 0.93   CA 9.7 8.9 8.1*  --   --    ALB 4.7  --   --   --   --    * 136 136  --   --    K 3.9 4.1 3.7  --   --     101 101  --   --    CO2 28.0 27.0 25.0  --   --    ALKPHO 86  --   --   --   --    AST 16  --   --   --   --    ALT 50*  --   --   --   --    BILT 0.7  --   --   --   --    TP 8.1  --   --   --   --        Estimated Glomerular Filtration Rate: 98.2 mL/min/1.73m2 (by CKD-EPI based on SCr of 0.93 mg/dL).    No results for input(s): \"TROP\", \"TROPHS\", \"CK\" in the last 168 hours.    Recent Labs   Lab 25  0843   PTP 14.3   INR 1.10                  Microbiology    [facility] Encounter  on 01/30/25   1. Tissue Aerobic Culture     Status: Abnormal    Collection Time: 01/31/25  8:50 PM    Specimen: Brain; Tissue   Result Value Ref Range    Tissue Culture Result 4+ growth Staphylococcus aureus (A) N/A    Tissue Smear 4+ WBCs seen (A) N/A    Tissue Smear 3+ Gram positive cocci in pairs and clusters (A) N/A       Susceptibility    Staphylococcus aureus -  (no method available)     Cefazolin  Sensitive      Clindamycin <=0.25 Sensitive      Erythromycin <=0.25 Sensitive      Gentamicin <=0.5 Sensitive      Levofloxacin 4 Resistant      Oxacillin 0.5 Sensitive      Trimethoprim/Sulfa <=10 Sensitive      Vancomycin 1 Sensitive    2. Anaerobic Culture     Status: None (Preliminary result)    Collection Time: 01/31/25  8:50 PM    Specimen: Brain; Tissue   Result Value Ref Range    Anaerobic Culture Pending N/A   3. Blood Culture     Status: None (Preliminary result)    Collection Time: 01/30/25  5:12 PM    Specimen: Blood,peripheral   Result Value Ref Range    Blood Culture Result No Growth 4 Days N/A         Imaging: Reviewed in Epic.    Medications:    nafcillin  2 g Intravenous Q4H    rosuvastatin  20 mg Oral Nightly    [Transfer Hold] lisinopril  10 mg Oral Daily    levETIRAcetam  500 mg Intravenous Q12H       Assessment & Plan:      #Brain mass c/w right frontal abscess s/p resection   #Headaches   CT noted 2.6cm hypodense mass  MRI brain shows similar  S/p right craniotomy with resection   Blood cultures sent to r/o abscess - started rocephin and vanc however procal and wbc wnl  Keppra 500mg IV BID for seizure prophylaxis  Neurosurgery consult, dental xrays negative, CT c/a/p with no metastatic disease   Culture GPC in clusters and pairs, MSSA, abx per ID  ID and neurosurgery following  Family asking about immunological w/u, initiated per ID, recommend outpt f/u with immunology      #Essential HTN  Lisinopril     #HLD  Statin      #Mild hyponatremia   Resolved     #Dispo, dc planning per ID          Kimberly Gee MD    Supplementary Documentation:     Quality:  DVT Mechanical Prophylaxis: LOUIE hose, SCDs, Early ambuation  DVT Pharmacologic Prophylaxis   Medication   None                Code Status: Full Code  [facility]: No urinary catheter in place  [facility] Duration (in days):   Central line:    W[facility]ACE: 2/3/2025    Discharge is dependent on: progress  At this point Mr. Hess is expected to be discharge to: tbd    The 21st Century Cures Act makes medical notes like these available to patients in the interest of transparency. Please be advised this is a medical document. Medical documents are intended to carry relevant information, facts as evident, and the clinical opinion of the practitioner. The medical note is intended as peer to peer communication and may appear blunt or direct. It is written in medical language and may contain abbreviations or verbiage that are unfamiliar.

## 2025-02-05 ENCOUNTER — PATIENT OUTREACH (OUTPATIENT)
Dept: CASE MANAGEMENT | Age: 55
End: 2025-02-05

## 2025-02-05 LAB
ASPER FLAVUS: NEGATIVE
ASPER FUMIGATUS: NEGATIVE
ASPER NIGER: NEGATIVE
BLASTOMYCES ABS QN DID: NEGATIVE

## 2025-02-05 NOTE — PROGRESS NOTES
TCM Request   Hospital Follow up for PCP (Discharge 2/4 edw)       PCP   Yesica Schwarz  [address] [place] [id number]  [phone]  Existing appt for 2/10@3pm    Confirmed with pt   Closing encounter

## 2025-02-06 LAB — UR GALACT AG SCR: NEGATIVE

## 2025-02-06 NOTE — TELEPHONE ENCOUNTER
A refill request was received for:  Requested Prescriptions     Pending Prescriptions Disp Refills    LISINOPRIL-HYDROCHLOROTHIAZIDE 10-12.5 MG Oral Tab [Pharmacy Med Name: LISINOPRIL-HCTZ 10-12.5 MG TAB] 90 tablet 3     Sig: TAKE 1 TABLET BY MOUTH EVERY DAY       Last refill date:  02/06/24     Last office visit: 12/26/23      Future Appointments   Date Time Provider Department Center   2/10/2025  3:00 PM Yesica Schwarz DO [facility] [address]   2/14/2025 10:15 AM Joslyn Ray, APRN [facility] [place]

## 2025-02-07 RX ORDER — LISINOPRIL AND HYDROCHLOROTHIAZIDE 10; 12.5 MG/1; MG/1
1 TABLET ORAL DAILY
Qty: 90 TABLET | Refills: 0 | Status: SHIPPED | OUTPATIENT
Start: 2025-02-07

## 2025-02-07 RX ORDER — ROSUVASTATIN CALCIUM 20 MG/1
20 TABLET, COATED ORAL DAILY
Qty: 90 TABLET | Refills: 3 | Status: SHIPPED | OUTPATIENT
Start: 2025-02-07

## 2025-02-07 NOTE — TELEPHONE ENCOUNTER
A refill request was received for:  Requested Prescriptions     Pending Prescriptions Disp Refills    ROSUVASTATIN 20 MG Oral Tab [Pharmacy Med Name: ROSUVASTATIN CALCIUM 20 MG TAB] 90 tablet 3     Sig: TAKE 1 TABLET BY MOUTH EVERY DAY       Last refill date:   1/11/2024    Last office visit: 12/26/23    Follow up due:  Future Appointments   Date Time Provider Department Center   2/10/2025  3:00 PM Yesica Schwarz DO [facility] [address]   2/14/2025 10:15 AM Joslyn Ray APRN [facility]

## 2025-02-10 ENCOUNTER — TELEPHONE (OUTPATIENT)
Dept: SURGERY | Facility: CLINIC | Age: 55
End: 2025-02-10

## 2025-02-10 ENCOUNTER — OFFICE VISIT (OUTPATIENT)
Dept: FAMILY MEDICINE CLINIC | Facility: CLINIC | Age: 55
End: 2025-02-10
Payer: COMMERCIAL

## 2025-02-10 VITALS
SYSTOLIC BLOOD PRESSURE: 122 MMHG | HEART RATE: 89 BPM | RESPIRATION RATE: 16 BRPM | DIASTOLIC BLOOD PRESSURE: 64 MMHG | BODY MASS INDEX: 23.38 KG/M2 | WEIGHT: 167 LBS | OXYGEN SATURATION: 98 % | HEIGHT: 71 IN

## 2025-02-10 DIAGNOSIS — G06.0 BRAIN ABSCESS (HCC): Primary | ICD-10-CM

## 2025-02-10 DIAGNOSIS — Z00.00 LABORATORY EXAMINATION ORDERED AS PART OF A COMPLETE PHYSICAL EXAMINATION: ICD-10-CM

## 2025-02-10 DIAGNOSIS — E53.8 B12 DEFICIENCY: ICD-10-CM

## 2025-02-10 DIAGNOSIS — L02.92 RECURRENT BOILS: ICD-10-CM

## 2025-02-10 DIAGNOSIS — E55.9 VITAMIN D DEFICIENCY: ICD-10-CM

## 2025-02-10 NOTE — TELEPHONE ENCOUNTER
Message below noted.    Patient was to take Keppra 7 days post-operatively and then discontinue.    Advised patient of message and to reach back out with any other questions or concerns.    Patient is active on [employer].  message sent.

## 2025-02-10 NOTE — TELEPHONE ENCOUNTER
Message below noted.    Patient reaching out to clarify if he needed to refill Keppra.     Surgery 1/31/25 RIGHT CRANIOTOMY FOR LESION RESECTION WITH ULTRASOUND.    Progress Note 2/04/25  \"Assessment:  55 y/o male with brain abscess who is POD #4 craniotomy for resection of lesion. Cultures growing staph aureus.      Plan:  Continue antibiotics per ID. PICC line per ID.   Keppra 500mg BID  x 7 days post op   Continue pain control   PT eval, encourage OOB and ambulation as tolerated   DVT prophylaxis: SCD's   Follow up in 2 weeks for wound check with ANTONY Baltazar as scheduled on 2/14  OK to DC from a Neurosurgical standpoint\"    Routed to Provider.

## 2025-02-10 NOTE — PROGRESS NOTES
Subjective:   Patient ID: Devora Hess is a 54 year old male.    Started having right jaw pain, diagnosed with TMJ.  No infection seen.  Walk in clinic gave steroids.  Then vomiting, HA, jaw pain, Thursday morning showing brain abscess.    No now F/C/sweats/N/V.  No dizzyness, no vision changes.  Pain from surgical site is diminished.  No drainage.  No bleeding.  Jaw pain gone.  Finished keppra.  No seizure like activity.      History/Other:   Review of Systems   All other systems reviewed and are negative.    Current Outpatient Medications   Medication Sig Dispense Refill    lisinopril-hydroCHLOROthiazide 10-12.5 MG Oral Tab TAKE 1 TABLET BY MOUTH EVERY DAY 90 tablet 0    ROSUVASTATIN 20 MG Oral Tab TAKE 1 TABLET BY MOUTH EVERY DAY 90 tablet 3    sodium chloride 0.9% SOLN 200 mL with nafcillin 2 g SOLR 12 g Inject 12 g into the vein continuous. 7 Bag 5    levETIRAcetam 500 MG Oral Tab Take 1 tablet (500 mg total) by mouth 2 (two) times daily. 10 tablet 0    HYDROcodone-acetaminophen 5-325 MG Oral Tab Take 1-2 tablets by mouth every 4 (four) hours as needed for Pain. 30 tablet 0    Naloxone HCl 4 MG/0.1ML Nasal Liquid 4 mg by Nasal route as needed. If patient remains unresponsive, repeat dose in other nostril 2-5 minutes after first dose. 1 kit 0    Omega-3 Fatty Acids (FISH OIL) 1000 MG Oral Capsule Delayed Release Take 1,000 mg by mouth daily.  0    Multiple Vitamin (MULTI-VITAMIN DAILY) Oral Tab Take by mouth.      Coenzyme Q10 (COQ10) 100 MG Oral Cap Take by mouth.       Allergies:Allergies[1]    Objective:   Physical Exam  Vitals reviewed.   Constitutional:       General: He is not in acute distress.     Appearance: He is well-developed. He is not diaphoretic.   Eyes:      General: No scleral icterus.        Right eye: No discharge.         Left eye: No discharge.      Conjunctiva/sclera: Conjunctivae normal.   Cardiovascular:      Rate and Rhythm: Normal rate and regular rhythm.      Heart sounds: Normal  heart sounds.   Pulmonary:      Effort: Pulmonary effort is normal. No respiratory distress.      Breath sounds: Normal breath sounds. No wheezing or rales.         Assessment & Plan:   1. Brain abscess (HCC)    2. Recurrent boils    3. Laboratory examination ordered as part of a complete physical examination    4. Vitamin D deficiency    5. B12 deficiency      1. Brain abscess (HCC)  Continue follow up with ID and neurosurgery.  - Allergy Referral - In Network    2. Recurrent boils  - Allergy Referral - In Network    3. Laboratory examination ordered as part of a complete physical examination  - Lipid Panel; Future  - PSA Total, Screen; Future  - CBC With Differential With Platelet; Future  - Comp Metabolic Panel (14); Future  - TSH W Reflex To Free T4; Future  - Vitamin D; Future  - Vitamin B12 [E]; Future    4. Vitamin D deficiency  - Vitamin D; Future    5. B12 deficiency  - Vitamin B12 [E]; Future      Orders Placed This Encounter   Procedures    Lipid Panel    PSA Total, Screen    CBC With Differential With Platelet    Comp Metabolic Panel (14)    TSH W Reflex To Free T4    Vitamin D    Vitamin B12 [E]       Meds This Visit:  Requested Prescriptions      No prescriptions requested or ordered in this encounter       Imaging & Referrals:  ALLERGY - INTERNAL         [1] No Known Allergies

## 2025-02-14 ENCOUNTER — OFFICE VISIT (OUTPATIENT)
Dept: SURGERY | Facility: CLINIC | Age: 55
End: 2025-02-14
Payer: COMMERCIAL

## 2025-02-14 VITALS — OXYGEN SATURATION: 96 % | SYSTOLIC BLOOD PRESSURE: 118 MMHG | HEART RATE: 95 BPM | DIASTOLIC BLOOD PRESSURE: 72 MMHG

## 2025-02-14 DIAGNOSIS — G06.0 BRAIN ABSCESS (HCC): Primary | ICD-10-CM

## 2025-02-14 PROCEDURE — 3074F SYST BP LT 130 MM HG: CPT | Performed by: NURSE PRACTITIONER

## 2025-02-14 PROCEDURE — 99024 POSTOP FOLLOW-UP VISIT: CPT | Performed by: NURSE PRACTITIONER

## 2025-02-14 PROCEDURE — 3078F DIAST BP <80 MM HG: CPT | Performed by: NURSE PRACTITIONER

## 2025-02-14 NOTE — PATIENT INSTRUCTIONS
Continue to follow up with Dr Huff  Follow up with Dr Thakur in 1 month  MRI brain w/wo prior to appointment  Call with any concerns with wound infection      Refill policies:    Allow 2-3 business days for refills; controlled substances may take longer.  Contact your pharmacy at least 5 days prior to running out of medication and have them send an electronic request or submit request through the “request refill” option in your [employer] account.  Refills are not addressed on weekends; covering physicians do not authorize routine medications on weekends.  No narcotics or controlled substances are refilled after noon on Fridays or by on call physicians.  By law, narcotics must be electronically prescribed.  A 30 day supply with no refills is the maximum allowed.  If your prescription is due for a refill, you may be due for a follow up appointment.  To best provide you care, patients receiving routine medications need to be seen at least once a year.  Patients receiving narcotic/controlled substance medications need to be seen at least once every 3 months.  In the event that your preferred pharmacy does not have the requested medication in stock (e.g. Backordered), it is your responsibility to find another pharmacy that has the requested medication available.  We will gladly send a new prescription to that pharmacy at your request.    Scheduling Tests:    If your physician has ordered radiology tests such as MRI or CT scans, please contact Central Scheduling at [phone] right away to schedule the test.  Once scheduled, the [facility] Centralized Referral Team will work with your insurance carrier to obtain pre-certification or prior authorization.  Depending on your insurance carrier, approval may take 3-10 days.  It is highly recommended patients assure they have received an authorization before having a test performed.  If test is done without insurance authorization, patient may be responsible for the entire amount  billed.      Precertification and Prior Authorizations:  If your physician has recommended that you have a procedure or additional testing performed the [facility] Centralized Referral Team will contact your insurance carrier to obtain pre-certification or prior authorization.    You are strongly encouraged to contact your insurance carrier to verify that your procedure/test has been approved and is a COVERED benefit.  Although the [facility] Centralized Referral Team does its due diligence, the insurance carrier gives the disclaimer that \"Although the procedure is authorized, this does not guarantee payment.\"    Ultimately the patient is responsible for payment.   Thank you for your understanding in this matter.  Paperwork Completion:  If you require FMLA or disability paperwork for your recovery, please make sure to either drop it off or have it faxed to our office at [phone]. Be sure the form has your name and date of birth on it.  The form will be faxed to our Forms Department and they will complete it for you.  There is a 25$ fee for all forms that need to be filled out.  Please be aware there is a 10-14 day turnaround time.  You will need to sign a release of information (BEVERLEY) form if your paperwork does not come with one.  You may call the Forms Department with any questions at [phone].  Their fax number is [phone].

## 2025-02-14 NOTE — PROGRESS NOTES
[facility]  Neurological Surgery Clinic Note    Devora Hess  8/7/1970  [id number]  PCP: eYsica Schwarz DO    REASON FOR VISIT:  Post op wound check    HISTORY OF PRESENT ILLNESS:  Devora Hess is a very pleasant 54 year old male with PMH HTN HLD presenting with right sided jaw pain and headache with associated nausea over the last 2 weeks and vomiting yesterday. Patient was evaluated by his PCP for the jaw pain and it was thought to be secondary to TMJ for which he was prescribed Prednisone. Patient had good relief of his pain with the Prednisone until the dose stopped Tuesday and he had return of severe right jaw pain on Wednesday. He presented to Urgent Care and a CT head was obtained revealing a right temporal mass for which he was recommended to go to ED  on 1/30/25 for further evaluation. Patient denies facial weakness or trouble with speech or chewing. He denies blurred or double vision, no focal weakness on no numbness or tingling in his face or extremities. He has not received any antibiotics prior to his hospitalization. He had right sided craniotomy with evacuation of right anterior temporal abscess on 1/31/25. He was evaluated by ID and WI home on 2/4/25 with plans for IV antibiotics through PICC line. He presents today for a wound check.     He states he is doing well. Not having pain. He continues his daily infusions. He has had some numbness to his left index finger which he had called Dr Thakur about but states it is improving.    PAST MEDICAL HISTORY:  Past Medical History:    Allergic rhinitis    Essential hypertension    Hemorrhoids    High cholesterol    Hyperlipidemia       PAST SURGICAL HISTORY:  Past Surgical History:   Procedure Laterality Date    Colonoscopy         FAMILY HISTORY:  family history includes Heart Attack in his father; Heart Disorder in his father.    SOCIAL HISTORY:   reports that he has never smoked. He has never used smokeless tobacco. He reports  that he does not drink alcohol and does not use drugs.    ALLERGIES:  Allergies[1]    MEDICATIONS:  Medications Ordered Prior to Encounter[2]    REVIEW OF SYSTEMS:  A 10-point system was reviewed.  Pertinent positives and negatives are noted in HPI.      PHYSICAL EXAMINATION:  VITAL SIGNS: /72   Pulse 95   SpO2 96%     A&Ox3, no acute distress  PERRL, EOMi, FS, TM  Full strength x 4, no drift  Sensation intact   Staples removed. Incision healing well.          ASSESSMENT:  53 yo male s/p craniotomy for brain abscess    Plan:  Continue to follow up with Dr Huff  Follow up with Dr Thakur in 1 month  MRI brain w/wo prior to appointment  Call with any concerns with wound infection    Joslyn Ray, APRN, CNP  Neurological Surgery  [facility]    Care Time: 30 min including face to face time, chart review, imaging interpretation, and coordination of care         [1] No Known Allergies  [2]   Current Outpatient Medications on File Prior to Visit   Medication Sig Dispense Refill    lisinopril-hydroCHLOROthiazide 10-12.5 MG Oral Tab TAKE 1 TABLET BY MOUTH EVERY DAY 90 tablet 0    ROSUVASTATIN 20 MG Oral Tab TAKE 1 TABLET BY MOUTH EVERY DAY 90 tablet 3    sodium chloride 0.9% SOLN 200 mL with nafcillin 2 g SOLR 12 g Inject 12 g into the vein continuous. 7 Bag 5    Multiple Vitamin (MULTI-VITAMIN DAILY) Oral Tab Take by mouth.      Coenzyme Q10 (COQ10) 100 MG Oral Cap Take by mouth.      Omega-3 Fatty Acids (FISH OIL) 1000 MG Oral Capsule Delayed Release Take 1,000 mg by mouth daily. (Patient not taking: Reported on 2/14/2025)  0     No current facility-administered medications on file prior to visit.

## 2025-02-14 NOTE — PROGRESS NOTES
Patient is here today for Post Op - 1/31/25- Sx: RIGHT CRANIOTOMY FOR LESION RESECTION WITH ULTRASOUND - wound check

## 2025-02-17 ENCOUNTER — HOSPITAL ENCOUNTER (EMERGENCY)
Age: 55
Discharge: HOME | End: 2025-02-17
Attending: EMERGENCY MEDICINE
Payer: COMMERCIAL

## 2025-02-17 ENCOUNTER — APPOINTMENT (OUTPATIENT)
Dept: GENERAL RADIOLOGY | Age: 55
End: 2025-02-17
Attending: EMERGENCY MEDICINE
Payer: COMMERCIAL

## 2025-02-17 VITALS
WEIGHT: 173 LBS | HEIGHT: 71 IN | DIASTOLIC BLOOD PRESSURE: 77 MMHG | BODY MASS INDEX: 24.22 KG/M2 | SYSTOLIC BLOOD PRESSURE: 115 MMHG | HEART RATE: 91 BPM | OXYGEN SATURATION: 97 % | TEMPERATURE: 100 F | RESPIRATION RATE: 18 BRPM

## 2025-02-17 DIAGNOSIS — R50.9 FEVER, UNSPECIFIED FEVER CAUSE: Primary | ICD-10-CM

## 2025-02-17 LAB
ALBUMIN SERPL-MCNC: 4.4 G/DL (ref 3.2–4.8)
ALBUMIN/GLOB SERPL: 1.3 {RATIO} (ref 1–2)
ALP LIVER SERPL-CCNC: 65 U/L
ALT SERPL-CCNC: 22 U/L
ANION GAP SERPL CALC-SCNC: 9 MMOL/L (ref 0–18)
APTT PPP: 32.5 SECONDS (ref 23–36)
AST SERPL-CCNC: 21 U/L (ref ?–34)
BASOPHILS # BLD AUTO: 0.01 X10(3) UL (ref 0–0.2)
BASOPHILS NFR BLD AUTO: 0.1 %
BILIRUB SERPL-MCNC: 1.3 MG/DL (ref 0.3–1.2)
BILIRUB UR QL STRIP.AUTO: NEGATIVE
BUN BLD-MCNC: 10 MG/DL (ref 9–23)
CALCIUM BLD-MCNC: 9.1 MG/DL (ref 8.7–10.6)
CHLORIDE SERPL-SCNC: 98 MMOL/L (ref 98–112)
CLARITY UR REFRACT.AUTO: CLEAR
CO2 SERPL-SCNC: 25 MMOL/L (ref 21–32)
COLOR UR AUTO: YELLOW
CREAT BLD-MCNC: 1.05 MG/DL
EGFRCR SERPLBLD CKD-EPI 2021: 85 ML/MIN/1.73M2 (ref 60–?)
EOSINOPHIL # BLD AUTO: 0.2 X10(3) UL (ref 0–0.7)
EOSINOPHIL NFR BLD AUTO: 1.5 %
ERYTHROCYTE [DISTWIDTH] IN BLOOD BY AUTOMATED COUNT: 13.9 %
GLOBULIN PLAS-MCNC: 3.4 G/DL (ref 2–3.5)
GLUCOSE BLD-MCNC: 163 MG/DL (ref 70–99)
GLUCOSE UR STRIP.AUTO-MCNC: NEGATIVE MG/DL
HCT VFR BLD AUTO: 38.9 %
HGB BLD-MCNC: 13.8 G/DL
IMM GRANULOCYTES # BLD AUTO: 0.08 X10(3) UL (ref 0–1)
IMM GRANULOCYTES NFR BLD: 0.6 %
INR BLD: 1.23 (ref 0.8–1.2)
KETONES UR STRIP.AUTO-MCNC: NEGATIVE MG/DL
LACTATE SERPL-SCNC: 1.5 MMOL/L (ref 0.5–2)
LEUKOCYTE ESTERASE UR QL STRIP.AUTO: NEGATIVE
LYMPHOCYTES # BLD AUTO: 0.91 X10(3) UL (ref 1–4)
LYMPHOCYTES NFR BLD AUTO: 6.8 %
MCH RBC QN AUTO: 28.5 PG (ref 26–34)
MCHC RBC AUTO-ENTMCNC: 35.5 G/DL (ref 31–37)
MCV RBC AUTO: 80.4 FL
MONOCYTES # BLD AUTO: 1.32 X10(3) UL (ref 0.1–1)
MONOCYTES NFR BLD AUTO: 9.8 %
NEUTROPHILS # BLD AUTO: 10.94 X10 (3) UL (ref 1.5–7.7)
NEUTROPHILS # BLD AUTO: 10.94 X10(3) UL (ref 1.5–7.7)
NEUTROPHILS NFR BLD AUTO: 81.2 %
NITRITE UR QL STRIP.AUTO: NEGATIVE
OSMOLALITY SERPL CALC.SUM OF ELEC: 277 MOSM/KG (ref 275–295)
PH UR STRIP.AUTO: 6 [PH] (ref 5–8)
PLATELET # BLD AUTO: 247 10(3)UL (ref 150–450)
POCT INFLUENZA A: NEGATIVE
POCT INFLUENZA B: NEGATIVE
POTASSIUM SERPL-SCNC: 3.3 MMOL/L (ref 3.5–5.1)
PROT SERPL-MCNC: 7.8 G/DL (ref 5.7–8.2)
PROT UR STRIP.AUTO-MCNC: NEGATIVE MG/DL
PROTHROMBIN TIME: 15.3 SECONDS (ref 11.6–14.8)
RBC # BLD AUTO: 4.84 X10(6)UL
RBC UR QL AUTO: NEGATIVE
SARS-COV-2 RNA RESP QL NAA+PROBE: NOT DETECTED
SODIUM SERPL-SCNC: 132 MMOL/L (ref 136–145)
SP GR UR STRIP.AUTO: 1.02 (ref 1–1.03)
UROBILINOGEN UR STRIP.AUTO-MCNC: 0.2 MG/DL
WBC # BLD AUTO: 13.5 X10(3) UL (ref 4–11)

## 2025-02-17 PROCEDURE — 96360 HYDRATION IV INFUSION INIT: CPT

## 2025-02-17 PROCEDURE — 85610 PROTHROMBIN TIME: CPT | Performed by: EMERGENCY MEDICINE

## 2025-02-17 PROCEDURE — 36415 COLL VENOUS BLD VENIPUNCTURE: CPT

## 2025-02-17 PROCEDURE — 87040 BLOOD CULTURE FOR BACTERIA: CPT | Performed by: EMERGENCY MEDICINE

## 2025-02-17 PROCEDURE — 81003 URINALYSIS AUTO W/O SCOPE: CPT | Performed by: EMERGENCY MEDICINE

## 2025-02-17 PROCEDURE — 99284 EMERGENCY DEPT VISIT MOD MDM: CPT

## 2025-02-17 PROCEDURE — 85025 COMPLETE CBC W/AUTO DIFF WBC: CPT | Performed by: EMERGENCY MEDICINE

## 2025-02-17 PROCEDURE — 99285 EMERGENCY DEPT VISIT HI MDM: CPT

## 2025-02-17 PROCEDURE — 80053 COMPREHEN METABOLIC PANEL: CPT | Performed by: EMERGENCY MEDICINE

## 2025-02-17 PROCEDURE — 96361 HYDRATE IV INFUSION ADD-ON: CPT

## 2025-02-17 PROCEDURE — 71045 X-RAY EXAM CHEST 1 VIEW: CPT | Performed by: EMERGENCY MEDICINE

## 2025-02-17 PROCEDURE — 85730 THROMBOPLASTIN TIME PARTIAL: CPT | Performed by: EMERGENCY MEDICINE

## 2025-02-17 PROCEDURE — 87502 INFLUENZA DNA AMP PROBE: CPT | Performed by: EMERGENCY MEDICINE

## 2025-02-17 PROCEDURE — 83605 ASSAY OF LACTIC ACID: CPT | Performed by: EMERGENCY MEDICINE

## 2025-02-17 RX ORDER — ASPIRIN 325 MG
325 TABLET ORAL ONCE
Status: DISCONTINUED | OUTPATIENT
Start: 2025-02-17 | End: 2025-02-17

## 2025-02-17 RX ORDER — ACETAMINOPHEN 500 MG
1000 TABLET ORAL ONCE
Status: COMPLETED | OUTPATIENT
Start: 2025-02-17 | End: 2025-02-17

## 2025-02-17 NOTE — ED PROVIDER NOTES
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  Patient has a PICC line in his right upper extremity, taking nafcillin based on positive cultures for MSSA.  Patient has no other complaints at this time other than mild chills and fever, temp was 101 at home last night.  Was advised by his infectious disease specialist to come in for evaluation    Objective:     Past Medical History:    Allergic rhinitis    Brain abscess (HCC)    Essential hypertension    Hemorrhoids    High cholesterol    Hyperlipidemia              Past Surgical History:   Procedure Laterality Date    Brain surgery      Colonoscopy                  Social History     Socioeconomic History    Marital status:    Tobacco Use    Smoking status: Never    Smokeless tobacco: Never   Vaping Use    Vaping status: Never Used   Substance and Sexual Activity    Alcohol use: Never    Drug use: Never   Other Topics Concern    Caffeine Concern No    Exercise Yes    Seat Belt Yes    Special Diet No    Stress Concern No    Weight Concern No     Social Drivers of Health     Food Insecurity: No Food Insecurity (1/30/2025)    Food Insecurity     Food Insecurity: Never true   Transportation Needs: No Transportation Needs (1/30/2025)    Transportation Needs     Lack of Transportation: No   Housing Stability: Low Risk  (1/30/2025)    Housing Stability     Housing Instability: No                  Physical Exam     ED Triage Vitals [02/17/25 1123]   /85   Pulse (!) 125   Resp 18   Temp (!) 100.8 °F (38.2 °C)   Temp src Oral   SpO2 97 %   O2 Device None (Room air)       Current Vitals:   Vital Signs  BP: 115/77  Pulse: 91  Resp:  18  Temp: 99.5 °F (37.5 °C)  Temp src: Oral    Oxygen Therapy  SpO2: 97 %  O2 Device: None (Room air)        Physical Exam  General: Normal appearance, no acute distress, nontoxic appearing  Head: Right temporal incision site is clean, dry, intact.  No underlying induration, tenderness, erythema, drainage.  Skin:  warm, dry, right upper extremity PICC line insertion site is clean, dry, and intact.  No cellulitic changes.  No swelling.  HEENT:  PERRLA, EOMI, normocephalic, atraumatic  Neck: supple  Cardiac: Regular rate and rhythm, no murmurs, rubs, gallops.  No lower extremity edema.  Lungs: nonlabored respirations, clear to auscultation bilaterally  Abdomen: Soft, nontender, nondistended, normal active bowel sound, no rebound or guarding  Neurologic: Alert and oriented x4, normal speech, moving all extremities  Psychiatric:  calm, cooperative    ED Course     Labs Reviewed   COMP METABOLIC PANEL (14) - Abnormal; Notable for the following components:       Result Value    Glucose 163 (*)     Sodium 132 (*)     Potassium 3.3 (*)     Bilirubin, Total 1.3 (*)     All other components within normal limits   CBC WITH DIFFERENTIAL WITH PLATELET - Abnormal; Notable for the following components:    WBC 13.5 (*)     HCT 38.9 (*)     Neutrophil Absolute Prelim 10.94 (*)     Neutrophil Absolute 10.94 (*)     Lymphocyte Absolute 0.91 (*)     Monocyte Absolute 1.32 (*)     All other components within normal limits   PROTHROMBIN TIME (PT) - Abnormal; Notable for the following components:    PT 15.3 (*)     INR 1.23 (*)     All other components within normal limits   PTT, ACTIVATED - Normal   LACTIC ACID, PLASMA - Normal   RAPID SARS-COV-2 BY PCR - Normal   POCT FLU TEST - Normal    Narrative:     This assay is a rapid molecular in vitro test utilizing nucleic acid amplification of influenza A and B viral RNA.   URINALYSIS WITH CULTURE REFLEX   BLOOD CULTURE   BLOOD CULTURE   RESPIRATORY FLU EXPAND PANEL + COVID-19       ED Course  as of 02/17/25 1530  ------------------------------------------------------------  Time: 02/17 1440  Comment: Independent interpretation of the CHEST X-RAY shows the trachea is midline, normal cardiac size and contour. No pleural effusions.  No pneumothorax.  No infiltrates or pulmonary edema.    ------------------------------------------------------------  Time: 02/17 1456  Comment: Discussed case with infectious disease, Dr. Huff, will follow-up patient's blood cultures as an outpatient       XR CHEST AP PORTABLE  (CPT=71045)    Result Date: 2/17/2025  CONCLUSION:  No acute radiographic findings.   LOCATION:  [facility]      Dictated by (CST): Chong Knight MD on 2/17/2025 at 2:43 PM     Finalized by (CST): Chong Knight MD on 2/17/2025 at 2:43 PM             MDM              Medical Decision Making  Fever in a patient who has an indwelling PICC line, ongoing treatment for brain abscess that was evacuated over 2 weeks ago  Differential diagnosis includes bacteremia, PICC line infection, UTI, pneumonia, viral syndrome, drug fever  Workup thus far has been unremarkable, patient is nontoxic, well-appearing  No signs that suggest fever related to antibiotics  Blood cultures pending  Discussed case with infectious disease, patient can go home but follow-up blood cultures, continue outpatient antibiotics for now      Amount and/or Complexity of Data Reviewed  Labs: ordered. Decision-making details documented in ED Course.  Radiology: ordered and independent interpretation performed. Decision-making details documented in ED Course.    Risk  Prescription drug management.  Decision regarding hospitalization.        Disposition and Plan     Clinical Impression:  1. Fever, unspecified fever cause         Disposition:  Discharge  2/17/2025  2:58 pm    Follow-up:  Phan Huff MD  [address] [address]  [phone]    Call in 2 day(s)  For blood culture result          Medications  Prescribed:  Discharge Medication List as of 2/17/2025  3:04 PM              Supplementary Documentation:

## 2025-02-17 NOTE — ED INITIAL ASSESSMENT (HPI)
Had brain surgery on 1/31- started on IV antibiotics- has PICC line and is on home antibiotics-  temp 101.7 last night- surgeon advised to come in today

## 2025-02-19 ENCOUNTER — PATIENT OUTREACH (OUTPATIENT)
Dept: INFECTIOUS DISEASE | Facility: CLINIC | Age: 55
End: 2025-02-19

## 2025-02-19 NOTE — PROGRESS NOTES
Patient reported fever evening of 2/16, and called office on 2/17, sent to ED.  He noted that wife was sick and thought she had flu.  Blood cultures sent in ED so far no growth. Flu PCR negative.  RVP was ordered but not collected.   WBC elevated at 13, no significant change from 2/1.  No eosinophila.  He reports no fever the last 2 days.  Follow up MRI planned, may need to move ealier if there are clinical concerns.

## 2025-02-20 ENCOUNTER — TELEPHONE (OUTPATIENT)
Dept: SURGERY | Facility: CLINIC | Age: 55
End: 2025-02-20

## 2025-02-20 ENCOUNTER — ORDER TRANSCRIPTION (OUTPATIENT)
Dept: INFECTIOUS DISEASE | Facility: CLINIC | Age: 55
End: 2025-02-20

## 2025-02-20 DIAGNOSIS — G06.0 BRAIN ABSCESS (HCC): Primary | ICD-10-CM

## 2025-02-20 NOTE — TELEPHONE ENCOUNTER
History of brain abscess. Patient had had fever since Sunday night. Patient went to ER and did blood cultures and testing, testing normal. But fever continues. It was 100.9 last night. Fevers are just during the night, not having fever during the day. Patient has MRI scheduled for tomorrow morning ordered by Dr. Huff. Please advise on any recommendations.     Patient seen by Joslyn on 2/14/25, assessment and plan below.     ASSESSMENT:  55 yo male s/p craniotomy for brain abscess     Plan:  Continue to follow up with Dr Huff  Follow up with Dr Thakur in 1 month  MRI brain w/wo prior to appointment  Call with any concerns with wound infection

## 2025-02-20 NOTE — TELEPHONE ENCOUNTER
Fevers started on Sunday. Dr Huff spoke with Dr Thakur recommended getting MRI   Fevers are only in the evening.  Planning to get MRI brain tomorrow. Will follow up with the results after review.

## 2025-02-20 NOTE — TELEPHONE ENCOUNTER
Patient is calling to speak with either Dr. Thakur or Joslyn in regards to his fever he has had for the last few days. He states infectious disease has some concerns and would like for him to get an MRI sooner rather than later.

## 2025-02-21 ENCOUNTER — HOSPITAL ENCOUNTER (OUTPATIENT)
Dept: MRI IMAGING | Facility: HOSPITAL | Age: 55
Discharge: HOME | End: 2025-02-21
Attending: NURSE PRACTITIONER
Payer: COMMERCIAL

## 2025-02-21 DIAGNOSIS — G06.0 BRAIN ABSCESS (HCC): ICD-10-CM

## 2025-02-21 PROCEDURE — 70553 MRI BRAIN STEM W/O & W/DYE: CPT | Performed by: INTERNAL MEDICINE

## 2025-02-21 PROCEDURE — A9575 INJ GADOTERATE MEGLUMI 0.1ML: HCPCS | Performed by: NURSE PRACTITIONER

## 2025-02-21 RX ORDER — GADOTERATE MEGLUMINE 376.9 MG/ML
15 INJECTION INTRAVENOUS
Status: COMPLETED | OUTPATIENT
Start: 2025-02-21 | End: 2025-02-21

## 2025-02-21 RX ADMIN — GADOTERATE MEGLUMINE 7 ML: 376.9 INJECTION INTRAVENOUS at 08:53:00

## 2025-02-21 RX ADMIN — GADOTERATE MEGLUMINE 15 ML: 376.9 INJECTION INTRAVENOUS at 08:53:00

## 2025-02-21 NOTE — TELEPHONE ENCOUNTER
Patient called and stated he completed MRI. I informed patient he would need to come in office to review results. Patient is requesting a phone call from Dr. Thakur instead. Please advise

## 2025-02-21 NOTE — TELEPHONE ENCOUNTER
Message below noted.    Patient was advised to schedule follow-up to review imaging. Patient is requesting a phone call with Provider.    Routed to Provider.

## 2025-02-24 NOTE — TELEPHONE ENCOUNTER
Spoke with the patient and his wife about the imaging findings, which are reassuring.  He reports that he is improving overall, including improvement in his jaw discomfort.  Will plan for follow-up as scheduled and he may defer scheduled MRI at this time and we will determine next imaging at his outpatient follow-up visit.

## 2025-02-26 ENCOUNTER — PATIENT OUTREACH (OUTPATIENT)
Dept: INFECTIOUS DISEASE | Facility: CLINIC | Age: 55
End: 2025-02-26

## 2025-02-26 NOTE — PROGRESS NOTES
Had fever 2 weeks ago, now resolved, repeat MRI noted.  CBC shows mild eosinophilia, no rash, can continue IV nafcillin at least through 3/19 with MRI prior to completion, low threshold to extend ivabx longer than 6 weeks if needed.

## 2025-03-12 ENCOUNTER — OFFICE VISIT (OUTPATIENT)
Dept: SURGERY | Facility: CLINIC | Age: 55
End: 2025-03-12
Payer: COMMERCIAL

## 2025-03-12 VITALS
OXYGEN SATURATION: 99 % | SYSTOLIC BLOOD PRESSURE: 128 MMHG | HEIGHT: 71 IN | HEART RATE: 78 BPM | WEIGHT: 172 LBS | DIASTOLIC BLOOD PRESSURE: 74 MMHG | BODY MASS INDEX: 24.08 KG/M2

## 2025-03-12 DIAGNOSIS — G06.0 BRAIN ABSCESS (HCC): Primary | ICD-10-CM

## 2025-03-12 PROCEDURE — 3078F DIAST BP <80 MM HG: CPT | Performed by: NEUROLOGICAL SURGERY

## 2025-03-12 PROCEDURE — 3074F SYST BP LT 130 MM HG: CPT | Performed by: NEUROLOGICAL SURGERY

## 2025-03-12 PROCEDURE — 99024 POSTOP FOLLOW-UP VISIT: CPT | Performed by: NEUROLOGICAL SURGERY

## 2025-03-12 PROCEDURE — 3008F BODY MASS INDEX DOCD: CPT | Performed by: NEUROLOGICAL SURGERY

## 2025-03-12 NOTE — PROGRESS NOTES
The following individual(s) verbally consented to be recorded using ambient AI listening technology and understand that they can each withdraw their consent to this listening technology at any point by asking the clinician to turn off or pause the recording:    Patient name: Devora Hess  Additional names:  Selma Means

## 2025-03-12 NOTE — PROGRESS NOTES
[facility]  Neurological Surgery Clinic Note    Devora Hess  8/7/1970  [id number]  PCP: Yesica Schwarz DO    REASON FOR VISIT:  Post op wound check    HISTORY OF PRESENT ILLNESS:  Devora Hess is a very pleasant 54 year old male with PMH HTN HLD presenting with right sided jaw pain and headache with associated nausea over the last 2 weeks and vomiting yesterday. Patient was evaluated by his PCP for the jaw pain and it was thought to be secondary to TMJ for which he was prescribed Prednisone. Patient had good relief of his pain with the Prednisone until the dose stopped Tuesday and he had return of severe right jaw pain on Wednesday. He presented to Urgent Care and a CT head was obtained revealing a right temporal mass for which he was recommended to go to ED  on 1/30/25 for further evaluation. Patient denies facial weakness or trouble with speech or chewing. He denies blurred or double vision, no focal weakness on no numbness or tingling in his face or extremities. He has not received any antibiotics prior to his hospitalization. He had right sided craniotomy with evacuation of right anterior temporal abscess on 1/31/25. He was evaluated by ID and MO home on 2/4/25 with plans for IV antibiotics through PICC line. He presents today for a wound check.     He states he is doing well. Not having pain. He continues his daily infusions. He has had some numbness to his left index finger which he had called Dr Thakur about but states it is improving.    Interval history 3/12/2025  He reports to be doing very well overall.  Reports intermittent tingling in his left index finger, and no other symptoms.  MRI brain 2/21/2025 demonstrates continued improvement at the previously evacuated abscess site.  He continues to follow closely with Dr. Huff who has ordered a repeat MRI brain which is ordered for next week.    PAST MEDICAL HISTORY:  Past Medical History:    Allergic rhinitis    Brain abscess (HCC)     Essential hypertension    Hemorrhoids    High cholesterol    Hyperlipidemia       PAST SURGICAL HISTORY:  Past Surgical History:   Procedure Laterality Date    Brain surgery      Colonoscopy         FAMILY HISTORY:  family history includes Heart Attack in his father; Heart Disorder in his father.    SOCIAL HISTORY:   reports that he has never smoked. He has never used smokeless tobacco. He reports that he does not drink alcohol and does not use drugs.    ALLERGIES:  Allergies[1]    MEDICATIONS:  Medications Ordered Prior to Encounter[2]    REVIEW OF SYSTEMS:  A 10-point system was reviewed.  Pertinent positives and negatives are noted in HPI.      PHYSICAL EXAMINATION:  VITAL SIGNS: /74   Pulse 78   Ht 71\"   Wt 172 lb (78 kg)   SpO2 99%   BMI 23.99 kg/m²     A&Ox3, no acute distress  PERRL, EOMi, FS, TM  Full strength x 4, no drift  Incision healing well    ASSESSMENT:  53 yo male s/p craniotomy for brain abscess    Plan:  Continue to follow up with Dr Huff  MRI brain w/wo scheduled for next week    Indra Thakur MD  Neurological Surgery  [facility] Time: 20 min including face to face time, chart review, imaging interpretation, and coordination of care         [1] No Known Allergies  [2]   Current Outpatient Medications on File Prior to Visit   Medication Sig Dispense Refill    lisinopril-hydroCHLOROthiazide 10-12.5 MG Oral Tab TAKE 1 TABLET BY MOUTH EVERY DAY 90 tablet 0    ROSUVASTATIN 20 MG Oral Tab TAKE 1 TABLET BY MOUTH EVERY DAY 90 tablet 3    sodium chloride 0.9% SOLN 200 mL with nafcillin 2 g SOLR 12 g Inject 12 g into the vein continuous. 7 Bag 5    Multiple Vitamin (MULTI-VITAMIN DAILY) Oral Tab Take by mouth.      Coenzyme Q10 (COQ10) 100 MG Oral Cap Take by mouth.      Omega-3 Fatty Acids (FISH OIL) 1000 MG Oral Capsule Delayed Release Take 1,000 mg by mouth daily. (Patient not taking: Reported on 3/12/2025)  0     No current  facility-administered medications on file prior to visit.

## 2025-03-12 NOTE — PATIENT INSTRUCTIONS
Refill policies:    Allow 2-3 business days for refills; controlled substances may take longer.  Contact your pharmacy at least 5 days prior to running out of medication and have them send an electronic request or submit request through the “request refill” option in your [facility] account.  Refills are not addressed on weekends; covering physicians do not authorize routine medications on weekends.  No narcotics or controlled substances are refilled after noon on Fridays or by on call physicians.  By law, narcotics must be electronically prescribed.  A 30 day supply with no refills is the maximum allowed.  If your prescription is due for a refill, you may be due for a follow up appointment.  To best provide you care, patients receiving routine medications need to be seen at least once a year.  Patients receiving narcotic/controlled substance medications need to be seen at least once every 3 months.  In the event that your preferred pharmacy does not have the requested medication in stock (e.g. Backordered), it is your responsibility to find another pharmacy that has the requested medication available.  We will gladly send a new prescription to that pharmacy at your request.    Scheduling Tests:    If your physician has ordered radiology tests such as MRI or CT scans, please contact Central Scheduling at [phone] right away to schedule the test.  Once scheduled, the [facility] Centralized Referral Team will work with your insurance carrier to obtain pre-certification or prior authorization.  Depending on your insurance carrier, approval may take 3-10 days.  It is highly recommended patients assure they have received an authorization before having a test performed.  If test is done without insurance authorization, patient may be responsible for the entire amount billed.      Precertification and Prior Authorizations:  If your physician has recommended that you have a procedure or additional testing performed the [facility]  Centralized Referral Team will contact your insurance carrier to obtain pre-certification or prior authorization.    You are strongly encouraged to contact your insurance carrier to verify that your procedure/test has been approved and is a COVERED benefit.  Although the [facility] Centralized Referral Team does its due diligence, the insurance carrier gives the disclaimer that \"Although the procedure is authorized, this does not guarantee payment.\"    Ultimately the patient is responsible for payment.   Thank you for your understanding in this matter.  Paperwork Completion:  If you require FMLA or disability paperwork for your recovery, please make sure to either drop it off or have it faxed to our office at [phone]. Be sure the form has your name and date of birth on it.  The form will be faxed to our Forms Department and they will complete it for you.  There is a 25$ fee for all forms that need to be filled out.  Please be aware there is a 10-14 day turnaround time.  You will need to sign a release of information (BEVERLEY) form if your paperwork does not come with one.  You may call the Forms Department with any questions at [phone].  Their fax number is [phone].

## 2025-03-14 ENCOUNTER — PATIENT MESSAGE (OUTPATIENT)
Dept: CASE MANAGEMENT | Age: 55
End: 2025-03-14

## 2025-03-17 ENCOUNTER — HOSPITAL ENCOUNTER (OUTPATIENT)
Dept: MRI IMAGING | Age: 55
Discharge: HOME | End: 2025-03-17
Attending: NURSE PRACTITIONER
Payer: COMMERCIAL

## 2025-03-17 DIAGNOSIS — G06.0 BRAIN ABSCESS (HCC): ICD-10-CM

## 2025-03-17 PROCEDURE — A9575 INJ GADOTERATE MEGLUMI 0.1ML: HCPCS | Performed by: NURSE PRACTITIONER

## 2025-03-17 PROCEDURE — 70553 MRI BRAIN STEM W/O & W/DYE: CPT | Performed by: NURSE PRACTITIONER

## 2025-03-17 RX ORDER — GADOTERATE MEGLUMINE 376.9 MG/ML
22 INJECTION INTRAVENOUS
Status: COMPLETED | OUTPATIENT
Start: 2025-03-17 | End: 2025-03-17

## 2025-03-17 RX ADMIN — GADOTERATE MEGLUMINE 22 ML: 376.9 INJECTION INTRAVENOUS at 08:58:00

## 2025-03-18 ENCOUNTER — TELEPHONE (OUTPATIENT)
Dept: SURGERY | Facility: CLINIC | Age: 55
End: 2025-03-18

## 2025-03-18 NOTE — TELEPHONE ENCOUNTER
Reviewed MRI with patient which is improving. Planning to follow up with Dr Rea tomorrow for antibiotic plan.

## 2025-03-18 NOTE — TELEPHONE ENCOUNTER
Message below noted.    Patient requesting call regarding MRI results completed DOS 3/17/25.    LOV 3/12/25  \"ASSESSMENT:  55 yo male s/p craniotomy for brain abscess     Plan:  Continue to follow up with Dr Huff  MRI brain w/wo scheduled for next week\"    Routed to Provider.

## 2025-03-19 ENCOUNTER — PATIENT OUTREACH (OUTPATIENT)
Dept: INFECTIOUS DISEASE | Facility: CLINIC | Age: 55
End: 2025-03-19

## 2025-03-19 DIAGNOSIS — G06.0 BRAIN ABSCESS (HCC): Primary | ICD-10-CM

## 2025-03-19 NOTE — PROGRESS NOTES
MRI showing smaller sized of enhancing region, now measuring 1.5x1.2x1.1 cm; plan to repeat MRI again in 3-4 weeks, while continuing nafcillin IV

## 2025-03-31 ENCOUNTER — HOSPITAL ENCOUNTER (OUTPATIENT)
Facility: HOSPITAL | Age: 55
Discharge: HOME | End: 2025-03-31
Attending: INTERNAL MEDICINE
Payer: COMMERCIAL

## 2025-03-31 ENCOUNTER — HOSPITAL ENCOUNTER (OUTPATIENT)
Dept: ULTRASOUND IMAGING | Facility: HOSPITAL | Age: 55
Discharge: HOME | End: 2025-03-31
Attending: INTERNAL MEDICINE
Payer: COMMERCIAL

## 2025-03-31 ENCOUNTER — HOSPITAL ENCOUNTER (EMERGENCY)
Facility: HOSPITAL | Age: 55
Discharge: HOME | End: 2025-03-31
Attending: EMERGENCY MEDICINE
Payer: COMMERCIAL

## 2025-03-31 VITALS
HEART RATE: 68 BPM | BODY MASS INDEX: 24.5 KG/M2 | WEIGHT: 175 LBS | OXYGEN SATURATION: 100 % | RESPIRATION RATE: 18 BRPM | DIASTOLIC BLOOD PRESSURE: 80 MMHG | SYSTOLIC BLOOD PRESSURE: 132 MMHG | TEMPERATURE: 98 F | HEIGHT: 71 IN

## 2025-03-31 DIAGNOSIS — T80.89XA: ICD-10-CM

## 2025-03-31 DIAGNOSIS — I82.621 ACUTE DEEP VEIN THROMBOSIS (DVT) OF OTHER VEIN OF RIGHT UPPER EXTREMITY (HCC): Primary | ICD-10-CM

## 2025-03-31 DIAGNOSIS — R60.9: ICD-10-CM

## 2025-03-31 LAB
ANION GAP SERPL CALC-SCNC: 6 MMOL/L (ref 0–18)
BUN BLD-MCNC: 7 MG/DL (ref 9–23)
CALCIUM BLD-MCNC: 9.3 MG/DL (ref 8.7–10.6)
CHLORIDE SERPL-SCNC: 107 MMOL/L (ref 98–112)
CO2 SERPL-SCNC: 26 MMOL/L (ref 21–32)
CREAT BLD-MCNC: 0.9 MG/DL
EGFRCR SERPLBLD CKD-EPI 2021: 102 ML/MIN/1.73M2 (ref 60–?)
GLUCOSE BLD-MCNC: 110 MG/DL (ref 70–99)
OSMOLALITY SERPL CALC.SUM OF ELEC: 287 MOSM/KG (ref 275–295)
POTASSIUM SERPL-SCNC: 3.6 MMOL/L (ref 3.5–5.1)
SODIUM SERPL-SCNC: 139 MMOL/L (ref 136–145)

## 2025-03-31 PROCEDURE — 36415 COLL VENOUS BLD VENIPUNCTURE: CPT

## 2025-03-31 PROCEDURE — 36569 INSJ PICC 5 YR+ W/O IMAGING: CPT

## 2025-03-31 PROCEDURE — 99283 EMERGENCY DEPT VISIT LOW MDM: CPT

## 2025-03-31 PROCEDURE — 80048 BASIC METABOLIC PNL TOTAL CA: CPT | Performed by: EMERGENCY MEDICINE

## 2025-03-31 PROCEDURE — 93971 EXTREMITY STUDY: CPT | Performed by: INTERNAL MEDICINE

## 2025-03-31 RX ORDER — LIDOCAINE HYDROCHLORIDE 10 MG/ML
5 INJECTION, SOLUTION EPIDURAL; INFILTRATION; INTRACAUDAL; PERINEURAL
Status: COMPLETED | OUTPATIENT
Start: 2025-03-31 | End: 2025-03-31

## 2025-03-31 RX ADMIN — LIDOCAINE HYDROCHLORIDE 5 ML: 10 INJECTION, SOLUTION EPIDURAL; INFILTRATION; INTRACAUDAL; PERINEURAL at 12:15:00

## 2025-03-31 NOTE — VASCULAR ACCESS
1040 :  Received a fax from 's office to discontinue PICC line and replace with the Midline in  the other arm as pt skin is peeling and feeling itchy and has blisters and draining round the PICC line site.  Paged  and verified and MD is ok for Vascular access team to evaluate the PICC line site and follow the VAT recommendations and if needed  is ok to replace the PICC Line as Midline is not recommended as pt has continuous infusion of Naficillin (  classified as a Vesicant).    1300 :             Pt is here for appointment, On assessment of the Rt arm PICC Line site, Line flushing with some resistance and no blood return, Site around the PICC site is very  reddened and with some abrasion above PICC line site and  skin around is peeled and with rash and  severe redness and PICC dressing with drainage  and loose and pt c/o itching , no leaking at the PICC line insertion site  when flushed . New PICC line replaced on the Left arm . Notified  and recommended for US doppler of the Rt arm. Informed 's office that pt may get benefited with Outpatient wound care evaluation.

## 2025-03-31 NOTE — ED INITIAL ASSESSMENT (HPI)
Pt had PICC line in R arm, had excessive fluid leakage and no blood return, PICC line removed from R arm to L arm, pt had US done this afternoon and found blood clot in R arm, pt denies pain/swelling

## 2025-04-01 ENCOUNTER — TELEPHONE (OUTPATIENT)
Dept: FAMILY MEDICINE CLINIC | Facility: CLINIC | Age: 55
End: 2025-04-01

## 2025-04-01 ENCOUNTER — HOSPITAL ENCOUNTER (OUTPATIENT)
Age: 55
Discharge: HOME | End: 2025-04-01
Attending: EMERGENCY MEDICINE
Payer: COMMERCIAL

## 2025-04-01 VITALS
TEMPERATURE: 98 F | DIASTOLIC BLOOD PRESSURE: 83 MMHG | HEART RATE: 88 BPM | RESPIRATION RATE: 18 BRPM | SYSTOLIC BLOOD PRESSURE: 185 MMHG | OXYGEN SATURATION: 99 % | WEIGHT: 175 LBS | BODY MASS INDEX: 24.5 KG/M2 | HEIGHT: 71 IN

## 2025-04-01 DIAGNOSIS — L24.4 IRRITANT CONTACT DERMATITIS DUE TO DRUG IN CONTACT WITH SKIN: Primary | ICD-10-CM

## 2025-04-01 PROCEDURE — 99213 OFFICE O/P EST LOW 20 MIN: CPT

## 2025-04-01 RX ORDER — TRIAMCINOLONE ACETONIDE 1 MG/G
1 CREAM TOPICAL 3 TIMES DAILY
Qty: 80 G | Refills: 0 | Status: SHIPPED | OUTPATIENT
Start: 2025-04-01 | End: 2025-04-11

## 2025-04-01 NOTE — TELEPHONE ENCOUNTER
Thinks he has an infection from a picc line that was put in. Has been on antibiotics. Picc line was put in after being ordered by dr ureña after a surgery. Says he's on IV antibiotics. Severe rash is being reported on right arm and he is hoping for medication. Please advise.

## 2025-04-01 NOTE — ED QUICK NOTES
Rounding Completed    Plan of Care reviewed. Waiting for medications.  Elimination needs assessed.  Provided update on plan of care .    Bed is locked and in lowest position. Call light within reach.

## 2025-04-01 NOTE — TELEPHONE ENCOUNTER
FYI: Spoke with patient. Picc line was relocated from right side to left side yesterday. Right arm has developed a rash.  He already has call in to Dr Huff's office. If in case he is not able to get in with specialist, he will go to immediate care for medical evaluation and treatment. Agreed to make notation.

## 2025-04-01 NOTE — ED INITIAL ASSESSMENT (HPI)
Right arm had a PICC line, line changed to left arm. Now with redness, itchy to right arm where the dressing was.

## 2025-04-01 NOTE — ED PROVIDER NOTES
Patient Seen in: [facility] Emergency Department      History     Chief Complaint   Patient presents with    Deep Vein Thrombosis     Blood clot right arm.      Stated Complaint:     Subjective:   HPI  54-year-old male presented to the ER with DVT right upper extremity.  The patient had a PICC line that was changed from his right arm to the left they noticed that the line had difficulty being flushed they sent him for ultrasound and found that he had a DVT.  Reviewing the records he had ultrasound performed earlier today 3/31/2025 By Dr. Huff that showed the DVT.  No shortness of breath or chest pain.  No fevers or chills.        Objective:     Past Medical History:    Allergic rhinitis    Brain abscess (HCC)    Essential hypertension    Hemorrhoids    High cholesterol    Hyperlipidemia              Past Surgical History:   Procedure Laterality Date    Brain surgery      Colonoscopy                  Social History     Socioeconomic History    Marital status:    Tobacco Use    Smoking status: Never    Smokeless tobacco: Never   Vaping Use    Vaping status: Never Used   Substance and Sexual Activity    Alcohol use: Never    Drug use: Never   Other Topics Concern    Caffeine Concern No    Exercise Yes    Seat Belt Yes    Special Diet No    Stress Concern No    Weight Concern No     Social Drivers of Health     Food Insecurity: No Food Insecurity (1/30/2025)    Food Insecurity     Food Insecurity: Never true   Transportation Needs: No Transportation Needs (1/30/2025)    Transportation Needs     Lack of Transportation: No   Housing Stability: Low Risk  (1/30/2025)    Housing Stability     Housing Instability: No                  Physical Exam     ED Triage Vitals [03/31/25 1817]   /85   Pulse 81   Resp 18   Temp 97.7 °F (36.5 °C)   Temp src    SpO2 98 %   O2 Device None (Room air)       Current Vitals:   Vital Signs  BP: 132/80  Pulse: 68  Resp: 18  Temp: 97.7 °F (36.5 °C)    Oxygen Therapy  SpO2:  100 %  O2 Device: None (Room air)        Physical Exam  General: Nontoxic 54-year-old male appears to be in no distress.  Focused exam of the patient's upper extremity: There is some swelling of the right upper extremity slight erythema.  Full range of motion of the hand and wrist.  Pulses +2.  Lungs: Clear bilateral without wheezes or rhonchi.  Cardiac: Heart rate of 70 normal S1-2 without murmurs or ectopy    ED Course     Labs Reviewed   BASIC METABOLIC PANEL (8) - Abnormal; Notable for the following components:       Result Value    Glucose 110 (*)     BUN 7 (*)     All other components within normal limits   RAINBOW DRAW LAVENDER   RAINBOW DRAW LIGHT GREEN   RAINBOW DRAW BLUE   RAINBOW DRAW GOLD                   MDM    Patient has known DVT in the right upper extremity.  He will be started on a DOAC.  Laboratories were sent to assess for renal failure and need to adjust doses of medications.  Glucose 110 BUN of 7.  The patient was given a dose of Eliquis here 10 mg.  He will be written for Eliquis for home.  Initially had the prescription sent electronically but his pharmacy was out of the medication.  He instead then was written a paper prescription he will be discharged.  Note to Patient  The 21st Century Cures Act makes medical notes like these available to patients in the interest of transparency. However, be advised this is a medical document and is intended as bnrk-mn-ltyr communication; it is written in medical language and may appear blunt, direct, or contain abbreviations or verbiage that are unfamiliar. Medical documents are intended to carry relevant information, facts as evident, and the clinical opinion of the practitioner.  Patient was evaluated and a screening exam was performed.   As a treating physician attending to the patient, I determined, within reasonable clinical confidence and prior to discharge, that an emergency medical condition was not or was no longer present.  There was no  indication for further evaluation, treatment or admission on an emergency basis.  Comprehensive verbal and written discharge and follow-up instructions were provided to help prevent relapse or worsening.  Patient was instructed to follow-up with their primary care provider for further evaluation and treatment, but to return immediately to the ER for worsening, concerning, new, changing or persisting symptoms.  I discussed the case with the patient and they had no questions, complaints, or concerns.  Patient felt comfortable going home.  ^^Please note that this report has been produced using speech recognition software and may contain errors related to that system including, but not limited to, errors in grammar, punctuation, and spelling, as well as words and phrases that possibly may have been recognized inappropriately.  If there are any questions or concerns, contact the dictating provider for clarification.      Medical Decision Making      Disposition and Plan     Clinical Impression:  1. Acute deep vein thrombosis (DVT) of other vein of right upper extremity (HCC)         Disposition:  Discharge  3/31/2025  8:30 pm    Follow-up:  Yesica Schwarz DO  [address] [place] [id number] [phone]    Schedule an appointment as soon as possible for a visit in 2 day(s)            Medications Prescribed:  Current Discharge Medication List        START taking these medications    Details   Apixaban Starter Pack 5 MG Oral Tablet Therapy Pack Take 10 mg by mouth 2 (two) times a day for 7 days, THEN 5 mg 2 (two) times a day for 23 days.  Qty: 74 each, Refills: 0    Comments: May dispense equivalent number of apixaban tablets if starter pack in not available.                 Supplementary Documentation:

## 2025-04-01 NOTE — ED PROVIDER NOTES
Patient Seen in: [facility]      History     Chief Complaint   Patient presents with    Rash Skin Problem     Stated Complaint: Arm or Hand Injury - Allergic reaction to PICC line which has been moved to the*    Subjective:   The history is provided by the patient.         54-year-old man with history significant for recent brain abscess with ongoing IV antibiotics as an outpatient presenting the ER with itchy rash on his right upper arm at the site where he recently removed his PICC line.  He states a DVT developed at the PICC site, the antibiotics probably extravasating to the skin due to obstruction and caused skin irritation.  Patient reporting mainly itching.  Denies pain, fevers, chills, nausea, vomiting.       Objective:     Past Medical History:    Allergic rhinitis    Brain abscess (HCC)    Essential hypertension    Hemorrhoids    High cholesterol    Hyperlipidemia              Past Surgical History:   Procedure Laterality Date    Brain surgery      Colonoscopy                  Social History     Socioeconomic History    Marital status:    Tobacco Use    Smoking status: Never    Smokeless tobacco: Never   Vaping Use    Vaping status: Never Used   Substance and Sexual Activity    Alcohol use: Never    Drug use: Never   Other Topics Concern    Caffeine Concern No    Exercise Yes    Seat Belt Yes    Special Diet No    Stress Concern No    Weight Concern No     Social Drivers of Health     Food Insecurity: No Food Insecurity (1/30/2025)    Food Insecurity     Food Insecurity: Never true   Transportation Needs: No Transportation Needs (1/30/2025)    Transportation Needs     Lack of Transportation: No   Housing Stability: Low Risk  (1/30/2025)    Housing Stability     Housing Instability: No              Review of Systems   All other systems reviewed and are negative.      Positive for stated complaint: Arm or Hand Injury - Allergic reaction to PICC line which has been moved to  the*  Other systems are as noted in HPI.  Constitutional and vital signs reviewed.      All other systems reviewed and negative except as noted above.    Physical Exam     ED Triage Vitals [04/01/25 1348]   BP (!) 185/83   Pulse 88   Resp 18   Temp 98.3 °F (36.8 °C)   Temp src Oral   SpO2 99 %   O2 Device None (Room air)       Current Vitals:   Vital Signs  BP: (!) 185/83  Pulse: 88  Resp: 18  Temp: 98.3 °F (36.8 °C)  Temp src: Oral    Oxygen Therapy  SpO2: 99 %  O2 Device: None (Room air)        Physical Exam  Vitals and nursing note reviewed.   Constitutional:       General: He is not in acute distress.     Appearance: Normal appearance.   HENT:      Head: Normocephalic and atraumatic.   Eyes:      Extraocular Movements: Extraocular movements intact.      Pupils: Pupils are equal, round, and reactive to light.   Cardiovascular:      Rate and Rhythm: Normal rate.   Pulmonary:      Effort: Pulmonary effort is normal.   Musculoskeletal:      Cervical back: Neck supple.   Skin:     General: Skin is warm and dry.      Comments: Scattered papular rash with diffuse erythema, scaling, crusting located in the right proximal upper arm.   Neurological:      Mental Status: He is alert and oriented to person, place, and time.   Psychiatric:         Mood and Affect: Mood normal.         Behavior: Behavior normal.             ED Course   Labs Reviewed - No data to display                MDM              Medical Decision Making  Problems Addressed:  Irritant contact dermatitis due to drug in contact with skin:     Details: Suspected contact dermatitis from antibiotic extravasation (obstructed PICC line secondary to DVT)  Currently on IV antibiotics which does cover skin austen, unlikely cellulitic changes  Will give topical steroid        Disposition and Plan     Clinical Impression:  1. Irritant contact dermatitis due to drug in contact with skin         Disposition:  Discharge  4/1/2025  2:15 pm    Follow-up:  Yesica Schwarz  DO  [address] [place] [id number] [phone]      Follow-up with your PCP or infectious diseases as discussed.          Medications Prescribed:  Current Discharge Medication List        START taking these medications    Details   triamcinolone 0.1 % External Cream Apply 1 Application topically 3 (three) times daily for 10 days.  Qty: 80 g, Refills: 0                 Supplementary Documentation:

## 2025-04-01 NOTE — DISCHARGE INSTRUCTIONS
You have been prescribed Eliquis (apixaban). A coupon is available that will provide a 30-day free trial. In case Eliquis coupon is lost, use this QR code to generate a new one by selecting \"Print Free Trial Card\" option; or go to the website [email or web address]      You have been prescribed Eliquis (apixaban). A coupon is available that will provide a 30-day free trial. In case Eliquis coupon is lost, use this QR code to generate a new one by selecting \"Print Free Trial Card\" option; or go to the website [email or web address]

## 2025-04-02 ENCOUNTER — PATIENT OUTREACH (OUTPATIENT)
Dept: INFECTIOUS DISEASE | Facility: CLINIC | Age: 55
End: 2025-04-02

## 2025-04-02 NOTE — PROGRESS NOTES
Developed contact dermatitis at dressing site picc line.  New picc placed in left arm.  Venous doppler also showed DVT and was stared on Eliquis in ED.  Await repeat MRI 4/14

## 2025-04-14 ENCOUNTER — HOSPITAL ENCOUNTER (OUTPATIENT)
Dept: MRI IMAGING | Age: 55
Discharge: HOME | End: 2025-04-14
Attending: INTERNAL MEDICINE
Payer: COMMERCIAL

## 2025-04-14 DIAGNOSIS — G06.0 BRAIN ABSCESS (HCC): ICD-10-CM

## 2025-04-14 PROCEDURE — 70553 MRI BRAIN STEM W/O & W/DYE: CPT | Performed by: INTERNAL MEDICINE

## 2025-04-14 PROCEDURE — A9575 INJ GADOTERATE MEGLUMI 0.1ML: HCPCS | Performed by: INTERNAL MEDICINE

## 2025-04-14 RX ORDER — GADOTERATE MEGLUMINE 376.9 MG/ML
22 INJECTION INTRAVENOUS
Status: COMPLETED | OUTPATIENT
Start: 2025-04-14 | End: 2025-04-14

## 2025-04-14 RX ADMIN — GADOTERATE MEGLUMINE 22 ML: 376.9 INJECTION INTRAVENOUS at 15:16:00

## 2025-04-15 ENCOUNTER — TELEPHONE (OUTPATIENT)
Dept: SURGERY | Facility: CLINIC | Age: 55
End: 2025-04-15

## 2025-04-15 NOTE — TELEPHONE ENCOUNTER
Message below noted.    Patient completed MRI Brain DOS 4/14/25. Patient requesting call from Dr. Thakur to discuss results when available.     LOV 3/12/25  \"ASSESSMENT:  53 yo male s/p craniotomy for brain abscess     Plan:  Continue to follow up with Dr Huff  MRI brain w/wo scheduled for next week\"    Routed to Provider.

## 2025-04-15 NOTE — TELEPHONE ENCOUNTER
Pt had MRI yesterday and would like to speak to Dr Thakur to review.Pt states he reviewed last MRI with Joslyn but would like to speak to Dr Thakur at his convenience for this MRI.

## 2025-04-16 NOTE — TELEPHONE ENCOUNTER
Spoke to the patient regarding the imaging findings.  After this discussion answering his questions, he expressed understanding appreciation.

## 2025-04-24 NOTE — PLAN OF CARE
Nutrition Assessment  Reason for consult/assessment: Initial, (MST) Nursing nutrition screen    Diagnosis, Labs, Medication, History: Reviewed    Pertinent nutrition history prior to admission: PMH reviewed and includes HTN, CAD, high cholesterol, CVA, and COPD. MST 2, patient reports decreased appetite and weight loss (2-13 #) prior to admission. Patient reports she has had a decreased appetite for the past couple of months. She reports making herself a breakfast meal and her grandson will order takeout for dinner. She also drinks 1 Premier Protein shake/day at home. Patient reports difficulty preparing foods due to her mobility and SOB.    Pertinent nutrition information pertaining to hospital course: RD met with patient. Patient endorses a decreased appetite and weight loss. She ordered a lunch tray consisting of chicken noodle soup, yogurt, peaches, and a soda. Patient likes Ensure but does not want it to count towards her fluid restriction. Willing to try Magic Cup instead. Labs reviewed, noting elevated Na and elevated glucose. Meds reviewed, noting lasix and steroid.                                Oral diet order: Cardiac, Fluid restriction (1800 mL/day)  Nutrition supplement/snacks: Butter Pecan Magic Cup BID.           Diet tolerance: TBD  Nutrition supplement / snack tolerance: TBD    Food allergies: Yes (Shellfish)    Anthropometrics Information  Wt Readings from Last 1 Encounters:   04/23/25 52 kg           % Weight change: Per Epic, patient has lost 27 # over the past x6 months (19.1% BW).  Weight change significant: Yes  Reason for weight change: Decreased intake    Estimated Needs     Calculated energy needs: 5569-2444  kcal                Calculated protein needs: 57-73  g     Calculated Fluid Needs: Per Provider               NFPE  Nutrition Focused Physical Exam  Physical Exam Completed: Yes (04/24/25 1143 : Lakesha Estrada RD)   Body Fat  Overall body fat: Mild/Moderate  Orbital region:  Received pt from PACU nurse approximately 0015. Picked up pt from CT.  AOx4. Pt moves all four extremities. Neuro check Q1. Neuro intact.  C/o right head incision site pain. Morphine and Norco given for pain management.  Cardene restarted to keep -160.  Plan of care discussed with pt and family.  Call light within reach.   Mild/Moderate  Cheek region (buccal fat pads): Mild/Moderate  Upper arm region (triceps/biceps): Mild/Moderate  Thoracic and lumbar region (rib/lower back/mid-axillary line): Mild/Moderate  Muscle Mass  Overall muscle mass: Mild/Moderate  Temporal region (temporalis muscle): Mild/Moderate  Collarbone region (clavicle bone, pectoralis major, trapezius muscle): Severe  Shoulder region (deltoid muscle): Mild/Moderate  Scapular bone region (trapezius, supraspinatus, infraspinatus muscles): Mild/Moderate  Hand region: Normal  Patellar region (quadriceps muscle): Unable to assess  Anterior thigh region (quadriceps muscle): Unable to assess  Posterior calf region (gastrocnemius muscle): Unable to assess    Skin Assessment/Wounds  Edema: Mild to moderate pitting, slight swelling of the extremity, indentation subsides quickly (0-30 sec) (1+ = BLE)             Treatment Plan/Interventions   Meals & snacks: Continue Cardiac and Fluid Restricted (1800 mL/day) Diet per MD. Encouraged intakes TID.    Nutrition supplement therapy: Sending Butter Pecan Magic Cup (290 kcal, 9 g protein each) BID to help patient meet nutrition needs.                                                                                 Nutrition education: RD placed tips for improving nutrition status in patient's AVS. Encouraged intakes TID to prevent further unintentional weight loss.                     Goals & Monitoring  Intervention: Meals and snacks, Nutrition supplement therapy, Nutrition education    Goal: Maintain or improve weight, Increase oral intake to >/equal 75% of meals and supplements  Intervention goal status: Initiated  Time frame to achieve goal: 3-5 days    Dietitian will monitor: Anthropometric Measurements, Food, beverage, and nutrient intake          Nutrition Diagnosis/PES   Nutrition Diagnosis: Malnutrition Malnutrition in the context of chronic illness: Severe   Related to: Decreased intake, Poor appetite, Intubation/respiratory  status     Malnutrition chronic; severe: Energy intake of </ 75% estimated energy requirement for >/equal 1 month, Weight loss of >10%/6 months (Mild/moderate depletion of body fat; Mild/moderate depletion of muscle mass)  Primary nutrition diagnosis status: New nutrition diagnosis

## 2025-05-15 NOTE — TELEPHONE ENCOUNTER
A refill request was received for:  Requested Prescriptions     Pending Prescriptions Disp Refills    LISINOPRIL-HYDROCHLOROTHIAZIDE 10-12.5 MG Oral Tab [Pharmacy Med Name: LISINOPRIL-HCTZ 10-12.5 MG TAB] 90 tablet 0     Sig: TAKE 1 TABLET BY MOUTH EVERY DAY       Last refill date:   2-7-25    Last office visit: 2-10-25    Follow up due:  No future appointments.

## 2025-05-16 RX ORDER — LISINOPRIL AND HYDROCHLOROTHIAZIDE 10; 12.5 MG/1; MG/1
1 TABLET ORAL DAILY
Qty: 90 TABLET | Refills: 0 | Status: SHIPPED | OUTPATIENT
Start: 2025-05-16

## 2025-05-19 RX ORDER — ROSUVASTATIN CALCIUM 20 MG/1
20 TABLET, COATED ORAL DAILY
Qty: 90 TABLET | Refills: 3 | Status: SHIPPED | OUTPATIENT
Start: 2025-05-19

## 2025-05-19 NOTE — TELEPHONE ENCOUNTER
A refill request was received for:  Requested Prescriptions     Pending Prescriptions Disp Refills    rosuvastatin 20 MG Oral Tab 90 tablet 3     Sig: Take 1 tablet (20 mg total) by mouth daily.       Last refill date: 02/07/25      Last office visit: 02/10/25    No future appointments.

## 2025-06-09 ENCOUNTER — OFFICE VISIT (OUTPATIENT)
Dept: FAMILY MEDICINE CLINIC | Facility: CLINIC | Age: 55
End: 2025-06-09
Payer: COMMERCIAL

## 2025-06-09 VITALS
TEMPERATURE: 98 F | SYSTOLIC BLOOD PRESSURE: 132 MMHG | DIASTOLIC BLOOD PRESSURE: 78 MMHG | HEART RATE: 64 BPM | WEIGHT: 170 LBS | RESPIRATION RATE: 16 BRPM | OXYGEN SATURATION: 97 % | HEIGHT: 71 IN | BODY MASS INDEX: 23.8 KG/M2
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
## 2025-06-09 DIAGNOSIS — S80.862A INFECTED INSECT BITE OF LEFT LOWER EXTREMITY, INITIAL ENCOUNTER: Primary | ICD-10-CM

## 2025-06-09 DIAGNOSIS — L08.9 INFECTED INSECT BITE OF LEFT LOWER EXTREMITY, INITIAL ENCOUNTER: Primary | ICD-10-CM

## 2025-06-09 PROCEDURE — 99213 OFFICE O/P EST LOW 20 MIN: CPT | Performed by: NURSE PRACTITIONER

## 2025-06-09 PROCEDURE — 3008F BODY MASS INDEX DOCD: CPT | Performed by: NURSE PRACTITIONER

## 2025-06-09 PROCEDURE — 3078F DIAST BP <80 MM HG: CPT | Performed by: NURSE PRACTITIONER

## 2025-06-09 PROCEDURE — 3075F SYST BP GE 130 - 139MM HG: CPT | Performed by: NURSE PRACTITIONER

## 2025-06-09 RX ORDER — APIXABAN 5 MG/1
5 TABLET, FILM COATED ORAL 2 TIMES DAILY
COMMUNITY
Start: 2025-05-14

## 2025-06-09 NOTE — PROGRESS NOTES
CHIEF COMPLAINT:     Chief Complaint   Patient presents with    Insect Bite     X 1 week, on L lower leg, swollen, pain, OTC cortisone       HPI:     Devora Hess is a 54 year old male who presents with concerns of possible infection due to insect bite. Patient first noticed symptoms 1 week ago.  Reports erythema, increased warmth, some tenderness to palpation, and no  drainage from area. Reports started out as small bump which looked like bug bite, has spread a bite, and tender around edges.  Symptoms have been worsening since onset.  Affected location includes: Left shin.      Precipitating event: presumed bite, although patient did not visualize.  Treatments: cortisone.  No other associated symptoms.  Denies fever, streaking of wound, or other signs of systemic illness.       Current Medications[1]   Past Medical History[2]   Social History:  Short Social Hx on File[3]     REVIEW OF SYSTEMS:   GENERAL: feels well otherwise, no fever, no chills.  SKIN: as above.  CHEST: no chest pains, no palpitations.  LUNGS: denies shortness of breath with exertion or rest. No wheezing, no cough.  LYMPH: denies enlargement of the lymph nodes.  MUSC/SKEL: no joint swelling, no joint stiffness.  CARDIOVASCULAR: denies chest pain on exertion or rest.  GI: no nausea, no vomiting or abdominal pain  NEURO: no abnormal sensation, no tingling of the skin or numbness.    EXAM:   /78   Pulse 64   Temp 97.7 °F (36.5 °C)   Resp 16   Ht 5' 11\" (1.803 m)   Wt 170 lb (77.1 kg)   SpO2 97%   BMI 23.71 kg/m²   GENERAL: well developed, well nourished,in no apparent distress  SKIN: Left anterior shin with approx 1cm indurated abscess with central puncture. No drainage or fluctuance.   Approx 2-3 cm surrounding erythema/tenderness.  HEAD: atraumatic, normocephalic  EYES: conjunctiva clear, EOM intact  NOSE: Normal external nose.  No rhinorrhea.  MOUTH: Moist oral mucosa. No lesions.   LUNGS: clear to auscultation bilaterally, no  wheezes or rhonchi. Breathing is non labored.  CARDIO: RRR without murmur  EXTREMITIES: no cyanosis, clubbing or edema.  Cap refill brisk- less than 2 seconds.     ASSESSMENT AND PLAN:     ASSESSMENT:   Encounter Diagnosis   Name Primary?    Infected insect bite of left lower extremity, initial encounter Yes       PLAN: Insect bite vs. Other local infection. Skin care discussed with patient. Warm compresses to area.  Instructions and Comfort Care as listed in Patient Instructions.  Medication as below. Follow up with PCP if not improving in 2-3 days.  Any spreading or streaking redness, worsening pain, or fevers go to ED.     Requested Prescriptions     Signed Prescriptions Disp Refills    amoxicillin clavulanate 875-125 MG Oral Tab 14 tablet 0     Sig: Take 1 tablet by mouth 2 (two) times daily for 7 days.       There are no Patient Instructions on file for this visit.    The patient indicates understanding of these issues and agrees to the plan.  The patient is asked to return in 3 days if sx's persist or worsen.        [1]   Current Outpatient Medications   Medication Sig Dispense Refill    ELIQUIS 5 MG Oral Tab Take 1 tablet (5 mg total) by mouth 2 (two) times daily.      amoxicillin clavulanate 875-125 MG Oral Tab Take 1 tablet by mouth 2 (two) times daily for 7 days. 14 tablet 0    rosuvastatin 20 MG Oral Tab Take 1 tablet (20 mg total) by mouth daily. 90 tablet 3    LISINOPRIL-HYDROCHLOROTHIAZIDE 10-12.5 MG Oral Tab TAKE 1 TABLET BY MOUTH EVERY DAY 90 tablet 0    Omega-3 Fatty Acids (FISH OIL) 1000 MG Oral Capsule Delayed Release Take 1,000 mg by mouth daily.  0    Multiple Vitamin (MULTI-VITAMIN DAILY) Oral Tab Take by mouth.      Coenzyme Q10 (COQ10) 100 MG Oral Cap Take by mouth.     [2]   Past Medical History:   Allergic rhinitis    Brain abscess (HCC)    Essential hypertension    Hemorrhoids    High cholesterol    Hyperlipidemia   [3]   Social History  Socioeconomic History    Marital status:     Tobacco Use    Smoking status: Never    Smokeless tobacco: Never   Vaping Use    Vaping status: Never Used   Substance and Sexual Activity    Alcohol use: Never    Drug use: Never   Other Topics Concern    Caffeine Concern No    Exercise Yes    Seat Belt Yes    Special Diet No    Stress Concern No    Weight Concern No     Social Drivers of Health     Food Insecurity: No Food Insecurity (1/30/2025)    Food Insecurity     Food Insecurity: Never true   Transportation Needs: No Transportation Needs (1/30/2025)    Transportation Needs     Lack of Transportation: No   Housing Stability: Low Risk  (1/30/2025)    Housing Stability     Housing Instability: No

## 2025-06-11 ENCOUNTER — PATIENT OUTREACH (OUTPATIENT)
Dept: INFECTIOUS DISEASE | Facility: CLINIC | Age: 55
End: 2025-06-11

## 2025-06-11 NOTE — PROGRESS NOTES
Went to urgent care for left leg cellulitis/abscss and was given augmentin.  There is a raised red area, that looks like an early abscess, scant discharge was sent for a culture.  LIkely MSSA give recent culture from brain abscess, and history of recurrent skin infections.   Can start doxy to also cover MRSA pending cultures.  Consider bactroban nares and CHG decolonization

## 2025-06-17 ENCOUNTER — OFFICE VISIT (OUTPATIENT)
Dept: FAMILY MEDICINE CLINIC | Facility: CLINIC | Age: 55
End: 2025-06-17
Payer: COMMERCIAL

## 2025-06-17 VITALS
DIASTOLIC BLOOD PRESSURE: 64 MMHG | SYSTOLIC BLOOD PRESSURE: 104 MMHG | OXYGEN SATURATION: 97 % | BODY MASS INDEX: 23.8 KG/M2 | RESPIRATION RATE: 16 BRPM | HEIGHT: 71 IN | HEART RATE: 66 BPM | WEIGHT: 170 LBS

## 2025-06-17 DIAGNOSIS — Z23 NEED FOR SHINGLES VACCINE: ICD-10-CM

## 2025-06-17 DIAGNOSIS — Z00.00 ANNUAL PHYSICAL EXAM: Primary | ICD-10-CM

## 2025-06-17 DIAGNOSIS — Z23 NEED FOR VACCINATION FOR PNEUMOCOCCUS: ICD-10-CM

## 2025-06-17 DIAGNOSIS — I10 PRIMARY HYPERTENSION: ICD-10-CM

## 2025-06-17 DIAGNOSIS — I82.621 ARM DVT (DEEP VENOUS THROMBOEMBOLISM), ACUTE, RIGHT (HCC): ICD-10-CM

## 2025-06-17 RX ORDER — LISINOPRIL AND HYDROCHLOROTHIAZIDE 12.5; 2 MG/1; MG/1
1 TABLET ORAL DAILY
Qty: 90 TABLET | Refills: 1 | Status: SHIPPED | OUTPATIENT
Start: 2025-06-17

## 2025-06-17 RX ORDER — DOXYCYCLINE HYCLATE 100 MG
100 TABLET ORAL 2 TIMES DAILY
COMMUNITY
Start: 2025-06-11

## 2025-06-17 NOTE — H&P
The 21st Century Cures Act makes medical notes like these available to patients in the interest of transparency. Please be advised this is a medical document. Medical documents are intended to carry relevant information, facts as evident, and the clinical opinion of the practitioner. The medical note is intended as peer to peer communication and may appear blunt or direct. It is written in medical language and may contain abbreviations or verbiage that are unfamiliar.   Devora Hess is a 54 year old male who presents for a complete physical exam.     HPI:   Primary hypertension, Chronic, stable. No new symptoms. seeing cardiologist.  As per cardiologist From end of February, he is taking Lisinopril-Hydrochlorothiazide 10-12.5 mg twice/day.    Colonoscopy: 04/27/2023    Current Medications[1]   Past Medical History[2]   Past Surgical History[3]   Family History[4]   Social History:  Short Social Hx on File[5]      REVIEW OF SYSTEMS:   GENERAL: feels well otherwise  SKIN: denies any unusual skin lesions or rash  EYES:denies blurred vision or double vision  HEENT: denies nasal congestion, sinus pain or ST, denies decreased hearing  LUNGS: denies shortness of breath or frequent cough  CV: denies chest pain, pressure or palpitations  GI: denies abdominal pain, heartburn, chronic diarrhea or constipation  : denies nocturia or changes in stream, denies erectile dysfunction  MS: denies back pain, myalgias or arthralgias  NEURO: denies headaches or dizziness  PSYCH: denies depression or anxiety  HEMATOLOGIC: denies bruising or bleeding easily  ENDOCRINE: denies frequent thirst or urination, denies unintentional weight gain/loss    EXAM:   /64   Pulse 66   Resp 16   Ht 5' 11\" (1.803 m)   Wt 170 lb   SpO2 97%   BMI 23.71 kg/m²       Body mass index is 23.71 kg/m².     GENERAL: well developed, well nourished,in no apparent distress  SKIN: no rashes,no suspicious lesions  HEENT: atraumatic, normocephalic,TMs  clear, posterior pharynx clear  EYES: PERRLA,conjunctiva clear  NECK: supple,no thyromegaly, no adenopathy  LUNGS: clear to auscultation, easy breathing, no cough  CV: S1 S2 noted, RRR, no murmur  GI: active bowel sounds, no rebound/rigidity/tenderness, no HSM  : 2 descended testes, no scrotal mass or tenderness, normal penis no lesions, no hernia  MS: Tarah, good tone & strength  EXT: no cyanosis, clubbing or edema  NEURO: Alert & oriented x 3, LEs +2DTRs  PSYCH: Mood and affect appropriate    ASSESSMENT AND PLAN:   Devora Hess is a 54 year old male who presents for a complete physical exam. Heart healthy diet, routine exercise, and annual flu vaccines encouraged.  The patient indicates understanding of these issues and agrees to the plan.  1. Annual physical exam  -- Prevnar 20 (PCV20) [58251]    2. Primary hypertension  - lisinopril-hydroCHLOROthiazide 20-12.5 MG Oral Tab; Take 1 tablet by mouth daily.  Dispense: 90 tablet; Refill: 1    3. Arm DVT (deep venous thromboembolism), acute, right (HCC)  - US VENOUS DOPPLER ARM RIGHT - DIAG IMG (CPT=93971); Future    4. Need for vaccination for pneumococcus  - Prevnar 20 (PCV20) [62105]    5. Need for shingles vaccine  - Zoster Recombinant Adjuvanted (Shingrix -Shingles) [16701]    Orders Placed This Encounter   Procedures    Prevnar 20 (PCV20) [64337]    Zoster Recombinant Adjuvanted (Shingrix -Shingles) [05459]     Follow up in 1 year.         [1]   Current Outpatient Medications   Medication Sig Dispense Refill    Doxycycline Hyclate 100 MG Oral Tab Take 1 tablet (100 mg total) by mouth 2 (two) times daily.      lisinopril-hydroCHLOROthiazide 20-12.5 MG Oral Tab Take 1 tablet by mouth daily. 90 tablet 1    ELIQUIS 5 MG Oral Tab Take 1 tablet (5 mg total) by mouth 2 (two) times daily.      rosuvastatin 20 MG Oral Tab Take 1 tablet (20 mg total) by mouth daily. 90 tablet 3    Omega-3 Fatty Acids (FISH OIL) 1000 MG Oral Capsule Delayed Release Take 1,000 mg by  mouth daily.  0    Multiple Vitamin (MULTI-VITAMIN DAILY) Oral Tab Take by mouth.      Coenzyme Q10 (COQ10) 100 MG Oral Cap Take by mouth.     [2]   Past Medical History:   Allergic rhinitis    Brain abscess (HCC)    Essential hypertension    Hemorrhoids    High cholesterol    Hyperlipidemia   [3]   Past Surgical History:  Procedure Laterality Date    Brain surgery      Colonoscopy     [4]   Family History  Problem Relation Age of Onset    Heart Attack Father         2005    Heart Disorder Father    [5]   Social History  Socioeconomic History    Marital status:    Tobacco Use    Smoking status: Never    Smokeless tobacco: Never   Vaping Use    Vaping status: Never Used   Substance and Sexual Activity    Alcohol use: Never    Drug use: Never   Other Topics Concern    Caffeine Concern No    Exercise Yes    Seat Belt Yes    Special Diet No    Stress Concern No    Weight Concern No     Social Drivers of Health     Food Insecurity: No Food Insecurity (1/30/2025)    Food Insecurity     Food Insecurity: Never true   Transportation Needs: No Transportation Needs (1/30/2025)    Transportation Needs     Lack of Transportation: No   Housing Stability: Low Risk  (1/30/2025)    Housing Stability     Housing Instability: No

## 2025-06-18 ENCOUNTER — PATIENT OUTREACH (OUTPATIENT)
Dept: INFECTIOUS DISEASE | Facility: CLINIC | Age: 55
End: 2025-06-18

## 2025-06-18 ENCOUNTER — LAB ENCOUNTER (OUTPATIENT)
Dept: LAB | Age: 55
End: 2025-06-18
Attending: FAMILY MEDICINE
Payer: COMMERCIAL

## 2025-06-18 DIAGNOSIS — E55.9 VITAMIN D DEFICIENCY: ICD-10-CM

## 2025-06-18 DIAGNOSIS — Z00.00 LABORATORY EXAMINATION ORDERED AS PART OF A COMPLETE PHYSICAL EXAMINATION: ICD-10-CM

## 2025-06-18 DIAGNOSIS — E53.8 B12 DEFICIENCY: ICD-10-CM

## 2025-06-18 LAB
ALBUMIN SERPL-MCNC: 4.9 G/DL (ref 3.2–4.8)
ALBUMIN/GLOB SERPL: 1.4 {RATIO} (ref 1–2)
ALP LIVER SERPL-CCNC: 75 U/L (ref 45–117)
ALT SERPL-CCNC: 54 U/L (ref 10–49)
ANION GAP SERPL CALC-SCNC: 12 MMOL/L (ref 0–18)
AST SERPL-CCNC: 30 U/L (ref ?–34)
BASOPHILS # BLD AUTO: 0.02 X10(3) UL (ref 0–0.2)
BASOPHILS NFR BLD AUTO: 0.3 %
BILIRUB SERPL-MCNC: 1 MG/DL (ref 0.3–1.2)
BUN BLD-MCNC: 10 MG/DL (ref 9–23)
CALCIUM BLD-MCNC: 9.8 MG/DL (ref 8.7–10.6)
CHLORIDE SERPL-SCNC: 100 MMOL/L (ref 98–112)
CHOLEST SERPL-MCNC: 149 MG/DL (ref ?–200)
CO2 SERPL-SCNC: 26 MMOL/L (ref 21–32)
COMPLEXED PSA SERPL-MCNC: 2.91 NG/ML (ref ?–4)
CREAT BLD-MCNC: 1.17 MG/DL (ref 0.7–1.3)
EGFRCR SERPLBLD CKD-EPI 2021: 75 ML/MIN/1.73M2 (ref 60–?)
EOSINOPHIL # BLD AUTO: 0.06 X10(3) UL (ref 0–0.7)
EOSINOPHIL NFR BLD AUTO: 0.9 %
ERYTHROCYTE [DISTWIDTH] IN BLOOD BY AUTOMATED COUNT: 11.8 %
FASTING PATIENT LIPID ANSWER: YES
FASTING STATUS PATIENT QL REPORTED: YES
GLOBULIN PLAS-MCNC: 3.4 G/DL (ref 2–3.5)
GLUCOSE BLD-MCNC: 102 MG/DL (ref 70–99)
HCT VFR BLD AUTO: 44.6 % (ref 39–53)
HDLC SERPL-MCNC: 38 MG/DL (ref 40–59)
HGB BLD-MCNC: 15.4 G/DL (ref 13–17.5)
IMM GRANULOCYTES # BLD AUTO: 0.01 X10(3) UL (ref 0–1)
IMM GRANULOCYTES NFR BLD: 0.2 %
LDLC SERPL CALC-MCNC: 89 MG/DL (ref ?–100)
LYMPHOCYTES # BLD AUTO: 0.51 X10(3) UL (ref 1–4)
LYMPHOCYTES NFR BLD AUTO: 7.7 %
MCH RBC QN AUTO: 28.7 PG (ref 26–34)
MCHC RBC AUTO-ENTMCNC: 34.5 G/DL (ref 31–37)
MCV RBC AUTO: 83.2 FL (ref 80–100)
MONOCYTES # BLD AUTO: 0.51 X10(3) UL (ref 0.1–1)
MONOCYTES NFR BLD AUTO: 7.7 %
NEUTROPHILS # BLD AUTO: 5.55 X10 (3) UL (ref 1.5–7.7)
NEUTROPHILS # BLD AUTO: 5.55 X10(3) UL (ref 1.5–7.7)
NEUTROPHILS NFR BLD AUTO: 83.2 %
NONHDLC SERPL-MCNC: 111 MG/DL (ref ?–130)
OSMOLALITY SERPL CALC.SUM OF ELEC: 285 MOSM/KG (ref 275–295)
PLATELET # BLD AUTO: 246 10(3)UL (ref 150–450)
POTASSIUM SERPL-SCNC: 4.1 MMOL/L (ref 3.5–5.1)
PROT SERPL-MCNC: 8.3 G/DL (ref 5.7–8.2)
RBC # BLD AUTO: 5.36 X10(6)UL (ref 4.3–5.7)
SODIUM SERPL-SCNC: 138 MMOL/L (ref 136–145)
TRIGL SERPL-MCNC: 124 MG/DL (ref 30–149)
TSI SER-ACNC: 1.23 UIU/ML (ref 0.55–4.78)
VIT B12 SERPL-MCNC: 754 PG/ML (ref 211–911)
VIT D+METAB SERPL-MCNC: 41.3 NG/ML (ref 30–100)
VLDLC SERPL CALC-MCNC: 20 MG/DL (ref 0–30)
WBC # BLD AUTO: 6.7 X10(3) UL (ref 4–11)

## 2025-06-18 PROCEDURE — 82607 VITAMIN B-12: CPT | Performed by: FAMILY MEDICINE

## 2025-06-18 PROCEDURE — 80061 LIPID PANEL: CPT | Performed by: FAMILY MEDICINE

## 2025-06-18 PROCEDURE — 82306 VITAMIN D 25 HYDROXY: CPT | Performed by: FAMILY MEDICINE

## 2025-06-18 PROCEDURE — 84153 ASSAY OF PSA TOTAL: CPT | Performed by: FAMILY MEDICINE

## 2025-06-18 PROCEDURE — 80050 GENERAL HEALTH PANEL: CPT | Performed by: FAMILY MEDICINE

## 2025-06-18 NOTE — PROGRESS NOTES
MSSA culture from leg abscess,  has already improved on po doxy.  Was given script to extend with cephalexin, as well as nasal mupirocin and CHG to prevent relapses

## (undated) DEVICE — SUT PLN GUT 5-0 18IN PC-1 ABSRB TAN YELLOWISH

## (undated) DEVICE — DISPOSABLE DUAL IRRIGATING BIPOLAR FORCEPS, NON-STICK,: Brand: SPETZLER-MALIS

## (undated) DEVICE — BANDAGE,GAUZE,BULKEE II,4.5"X4.1YD,STRL: Brand: MEDLINE

## (undated) DEVICE — SUT VCRL 0 18IN CT-1 ABSRB VLT CR L36MM 1/2

## (undated) DEVICE — ZEISS MD DRAPE

## (undated) DEVICE — COTTON BALLS-STRUNG 1/4": Brand: COTTON BALLS

## (undated) DEVICE — ANTISEPTIC 4OZ 70% ISO ALC

## (undated) DEVICE — E-Z CLEAN, NON-STICK, PTFE COATED, ELECTROSURGICAL BLADE ELECTRODE, MODIFIED EXTENDED INSULATION, 4 INCH (10.2 CM): Brand: MEGADYNE

## (undated) DEVICE — SOLUTION IRRIG 1000ML 0.9% NACL USP BTL

## (undated) DEVICE — BUNDLE,LISTER BANDAGE SCISSORS,4-1/2", SILVER, 3/PKG: Brand: ADC

## (undated) DEVICE — STYLET 9735428 2 COIL SINGLE PACKAGE

## (undated) DEVICE — STERILE-Z MAYO STAND COVERS CLEAR POLYETHYLENE STERILE UNIVERSAL FIT 20 PER CASE: Brand: STERILE-Z

## (undated) DEVICE — PAD SACRAL SPAN AID

## (undated) DEVICE — BIPOLAR IRRIGATOR INTEGRATED TUBING AND BIPOLAR CORD SET, DISPOSABLE

## (undated) DEVICE — IRRIGATION SUCTION CASSETTE: Brand: SONOPET IQ

## (undated) DEVICE — SLEEVE COMPR MD KNEE LEN SGL USE KENDALL SCD

## (undated) DEVICE — COVER,LIGHT,CAMERA,HARD,1/PK,STRL: Brand: MEDLINE

## (undated) DEVICE — SPONGE 4X4 10PK

## (undated) DEVICE — HOOK RETRCT L12MM E 2 BLNT LONE STAR

## (undated) DEVICE — 12CM IQ STANDARD: Brand: SONOPET IQ

## (undated) DEVICE — XEROFORM OCCLUSIVE GAUZE STRIP OVERWRAP, 3% BISMUTH TRIBROMOPHENATE IN PETROLATUM BLEND: Brand: XEROFORM

## (undated) DEVICE — SYRINGE MED 10ML LL TIP W/O SFTY DISP

## (undated) DEVICE — INTEGRA® SATALOFF DISPOSABLE SICKLE KNIFE: Brand: INTEGRA®

## (undated) DEVICE — RANEY SCALP CLIP STERILE: Brand: AESCULAP

## (undated) DEVICE — DRAPE FLD WRM W44XL66IN TRNSPAR POLYUR ORS

## (undated) DEVICE — MARKER SKIN PREP-RESISTANT ST: Brand: MEDLINE INDUSTRIES, INC.

## (undated) DEVICE — SUT PROL 10-0 5IN BV100-4 NABSRB BLU L5.1MM 3

## (undated) DEVICE — HEMOSTAT KIT SURGIFLO THROM 8ML

## (undated) DEVICE — ELECTRODE ES L10.2CM BLDE L4IN EXT MPLR OPN

## (undated) DEVICE — 3M™ IOBAN™ 2 ANTIMICROBIAL INCISE DRAPE 6648EZ: Brand: IOBAN™ 2

## (undated) DEVICE — 4-PORT MANIFOLD: Brand: NEPTUNE 2

## (undated) DEVICE — 3M™ TEGADERM™ TRANSPARENT FILM DRESSING FRAME STYLE, 1626W, 4 IN X 4-3/4 IN (10 CM X 12 CM), 50/CT 4CT/CASE: Brand: 3M™ TEGADERM™

## (undated) DEVICE — SPHERE NAVI 4 MRK PASS STLTH STN

## (undated) DEVICE — 3M™ TEGADERM™ TRANSPARENT FILM DRESSING FRAME STYLE, 1624W, 2-3/8 IN X 2-3/4 IN (6 CM X 7 CM), 100/CT 4CT/CASE: Brand: 3M™ TEGADERM™

## (undated) DEVICE — SPONGE,NEURO,0.5"X1.5",XR,STRL,LF,10/PK: Brand: MEDLINE

## (undated) DEVICE — GLOVE SUR 7.5 SENSICARE PI PIP GRN PWD F

## (undated) DEVICE — GLOVE SUR 7.5 SENSICARE PI PIP CRM PWD F

## (undated) DEVICE — SUT PROL 6-0 30IN BV-1 NABSRB BLU 9.3MM 3/8 C

## (undated) DEVICE — GOWN,SIRUS,FABRIC-REINFORCED,X-LARGE: Brand: MEDLINE

## (undated) DEVICE — STOCKINETTE SUR 9X48IN IMPERV FOR HANDLING

## (undated) DEVICE — CONTAINER,SPECIMEN,PNEU TUBE,4OZ,OR STRL: Brand: MEDLINE

## (undated) DEVICE — SLIM BODY SKIN STAPLER: Brand: APPOSE ULC

## (undated) DEVICE — MONITORING NEUROPHYSIOLOGICAL

## (undated) DEVICE — SUT COAT VCRL+ 2-0 18IN CP-2 ABSRB UD ANTIBAC

## (undated) DEVICE — PATIENT TRACKER 9734887 NON-INVASIVE

## (undated) DEVICE — 2.3MM SPIRAL ROUTER

## (undated) DEVICE — CRANIOTOMY CDS: Brand: MEDLINE INDUSTRIES, INC.

## (undated) DEVICE — DRAPE,TOWEL,LARGE,INVISISHIELD: Brand: MEDLINE